# Patient Record
Sex: FEMALE | Race: BLACK OR AFRICAN AMERICAN | Employment: PART TIME | URBAN - METROPOLITAN AREA
[De-identification: names, ages, dates, MRNs, and addresses within clinical notes are randomized per-mention and may not be internally consistent; named-entity substitution may affect disease eponyms.]

---

## 2017-04-29 ENCOUNTER — HOSPITAL ENCOUNTER (EMERGENCY)
Age: 42
Discharge: HOME | End: 2017-04-29
Attending: EMERGENCY MEDICINE | Admitting: EMERGENCY MEDICINE
Payer: MEDICAID

## 2017-04-29 ENCOUNTER — APPOINTMENT (OUTPATIENT)
Dept: ULTRASOUND IMAGING | Age: 42
End: 2017-04-29
Attending: PHYSICIAN ASSISTANT
Payer: MEDICAID

## 2017-04-29 VITALS
RESPIRATION RATE: 18 BRPM | HEIGHT: 65 IN | BODY MASS INDEX: 29.16 KG/M2 | WEIGHT: 175 LBS | OXYGEN SATURATION: 99 % | TEMPERATURE: 97.3 F | SYSTOLIC BLOOD PRESSURE: 154 MMHG | DIASTOLIC BLOOD PRESSURE: 93 MMHG | HEART RATE: 77 BPM

## 2017-04-29 DIAGNOSIS — S16.1XXA CERVICAL STRAIN, INITIAL ENCOUNTER: ICD-10-CM

## 2017-04-29 DIAGNOSIS — N93.9 VAGINAL BLEEDING: Primary | ICD-10-CM

## 2017-04-29 LAB
ANION GAP BLD CALC-SCNC: 5 MMOL/L (ref 3–18)
APPEARANCE UR: ABNORMAL
BACTERIA URNS QL MICRO: ABNORMAL /HPF
BASOPHILS # BLD AUTO: 0 K/UL (ref 0–0.06)
BASOPHILS # BLD: 0 % (ref 0–2)
BILIRUB UR QL: NEGATIVE
BUN SERPL-MCNC: 8 MG/DL (ref 7–18)
BUN/CREAT SERPL: 12 (ref 12–20)
CALCIUM SERPL-MCNC: 8.2 MG/DL (ref 8.5–10.1)
CHLORIDE SERPL-SCNC: 107 MMOL/L (ref 100–108)
CO2 SERPL-SCNC: 28 MMOL/L (ref 21–32)
COLOR UR: ABNORMAL
CREAT SERPL-MCNC: 0.67 MG/DL (ref 0.6–1.3)
DIFFERENTIAL METHOD BLD: ABNORMAL
EOSINOPHIL # BLD: 0.1 K/UL (ref 0–0.4)
EOSINOPHIL NFR BLD: 1 % (ref 0–5)
EPITH CASTS URNS QL MICRO: ABNORMAL /LPF (ref 0–5)
ERYTHROCYTE [DISTWIDTH] IN BLOOD BY AUTOMATED COUNT: 15.6 % (ref 11.6–14.5)
GLUCOSE SERPL-MCNC: 95 MG/DL (ref 74–99)
GLUCOSE UR STRIP.AUTO-MCNC: NEGATIVE MG/DL
HCG UR QL: NEGATIVE
HCT VFR BLD AUTO: 28.4 % (ref 35–45)
HGB BLD-MCNC: 8.9 G/DL (ref 12–16)
HGB UR QL STRIP: ABNORMAL
KETONES UR QL STRIP.AUTO: ABNORMAL MG/DL
LEUKOCYTE ESTERASE UR QL STRIP.AUTO: ABNORMAL
LYMPHOCYTES # BLD AUTO: 31 % (ref 21–52)
LYMPHOCYTES # BLD: 2.7 K/UL (ref 0.9–3.6)
MCH RBC QN AUTO: 25.2 PG (ref 24–34)
MCHC RBC AUTO-ENTMCNC: 31.3 G/DL (ref 31–37)
MCV RBC AUTO: 80.5 FL (ref 74–97)
MONOCYTES # BLD: 0.8 K/UL (ref 0.05–1.2)
MONOCYTES NFR BLD AUTO: 9 % (ref 3–10)
MUCOUS THREADS URNS QL MICRO: ABNORMAL /LPF
NEUTS SEG # BLD: 5.1 K/UL (ref 1.8–8)
NEUTS SEG NFR BLD AUTO: 59 % (ref 40–73)
NITRITE UR QL STRIP.AUTO: NEGATIVE
PH UR STRIP: 8.5 [PH] (ref 5–8)
PLATELET # BLD AUTO: 292 K/UL (ref 135–420)
PMV BLD AUTO: 9 FL (ref 9.2–11.8)
POTASSIUM SERPL-SCNC: 3.8 MMOL/L (ref 3.5–5.5)
PROT UR STRIP-MCNC: 30 MG/DL
RBC # BLD AUTO: 3.53 M/UL (ref 4.2–5.3)
RBC #/AREA URNS HPF: ABNORMAL /HPF (ref 0–5)
SODIUM SERPL-SCNC: 140 MMOL/L (ref 136–145)
SP GR UR REFRACTOMETRY: 1.03 (ref 1–1.03)
UROBILINOGEN UR QL STRIP.AUTO: 1 EU/DL (ref 0.2–1)
WBC # BLD AUTO: 8.6 K/UL (ref 4.6–13.2)
WBC URNS QL MICRO: ABNORMAL /HPF (ref 0–4)

## 2017-04-29 PROCEDURE — 81001 URINALYSIS AUTO W/SCOPE: CPT | Performed by: PHYSICIAN ASSISTANT

## 2017-04-29 PROCEDURE — 85025 COMPLETE CBC W/AUTO DIFF WBC: CPT | Performed by: PHYSICIAN ASSISTANT

## 2017-04-29 PROCEDURE — 99284 EMERGENCY DEPT VISIT MOD MDM: CPT

## 2017-04-29 PROCEDURE — 80048 BASIC METABOLIC PNL TOTAL CA: CPT | Performed by: PHYSICIAN ASSISTANT

## 2017-04-29 PROCEDURE — 76830 TRANSVAGINAL US NON-OB: CPT

## 2017-04-29 PROCEDURE — 81025 URINE PREGNANCY TEST: CPT | Performed by: PHYSICIAN ASSISTANT

## 2017-04-29 RX ORDER — TRAMADOL HYDROCHLORIDE 50 MG/1
50 TABLET ORAL
Qty: 20 TAB | Refills: 0 | Status: SHIPPED | OUTPATIENT
Start: 2017-04-29 | End: 2017-05-04

## 2017-04-29 RX ORDER — SULFAMETHOXAZOLE AND TRIMETHOPRIM 800; 160 MG/1; MG/1
1 TABLET ORAL 2 TIMES DAILY
Qty: 6 TAB | Refills: 0 | Status: SHIPPED | OUTPATIENT
Start: 2017-04-29 | End: 2017-05-02

## 2017-04-29 NOTE — ED PROVIDER NOTES
HPI Comments: 11:51 AM Rosalva Abdullahi is a 38 y.o. female who presents to the ED c/o  vaginal bleeding that started yesterday but worsened this morning and neck pain x3 days. The pt reports that her last period was three weeks ago, but it was much heavier than usual. Her period lasted 5 days as usual. +sexually active, denies vaginal discharge. Denies recent injury or trauma to neck. Woke up with pain 2 days ago to bilateral sides, no midline pain. No concern for meningeal symptoms. She denies fever, trauma, HA, neck pain, N/V/D, abdominal pain, and any further complaints. History reviewed. No pertinent past medical history. Des Peck MD PCP       The history is provided by the patient. No past medical history on file. Past Surgical History:   Procedure Laterality Date    HX BREAST BIOPSY  6/2006    Right-benign    HX OTHER SURGICAL  1/2013    left neck cyst removal         Family History:   Problem Relation Age of Onset    Hypertension Mother     Hypertension Father     Heart Disease Father        Social History     Social History    Marital status: SINGLE     Spouse name: N/A    Number of children: N/A    Years of education: N/A     Occupational History    Not on file. Social History Main Topics    Smoking status: Current Every Day Smoker     Packs/day: 0.50     Years: 10.00    Smokeless tobacco: Not on file    Alcohol use 0.0 oz/week    Drug use: Yes     Special: Marijuana    Sexual activity: Yes     Partners: Male     Birth control/ protection: Condom     Other Topics Concern    Not on file     Social History Narrative         ALLERGIES: Review of patient's allergies indicates no known allergies. Review of Systems   Constitutional: Negative for chills and fever. HENT: Negative for congestion and sore throat. Respiratory: Negative for cough and shortness of breath. Cardiovascular: Negative for chest pain and leg swelling.    Gastrointestinal: Negative for abdominal pain, diarrhea, nausea and vomiting. Genitourinary: Positive for vaginal bleeding. Negative for dysuria and hematuria. Musculoskeletal: Positive for neck pain. Negative for back pain. Skin: Negative for rash and wound. Neurological: Positive for dizziness. Negative for syncope, light-headedness and headaches. Psychiatric/Behavioral: Negative for behavioral problems. The patient is not nervous/anxious. Vitals:    04/29/17 1142   BP: (!) 154/93   Pulse: 77   Resp: 18   Temp: 97.3 °F (36.3 °C)   SpO2: 99%   Weight: 79.4 kg (175 lb)   Height: 5' 5\" (1.651 m)            Physical Exam   Constitutional: She is oriented to person, place, and time. She appears well-developed and well-nourished. No distress. HENT:   Head: Normocephalic and atraumatic. Mouth/Throat: Oropharynx is clear and moist.   Eyes: Conjunctivae are normal.   Neck: Normal range of motion and full passive range of motion without pain. Neck supple. Muscular tenderness present. No spinous process tenderness present. No rigidity. No tracheal deviation, no edema, no erythema and normal range of motion present. No Brudzinski's sign and no Kernig's sign noted. No thyroid mass and no thyromegaly present. Mild paraspinal muscle tenderness with palpation. No spinous process tenderness    Cardiovascular: Normal rate, regular rhythm and normal heart sounds. Pulmonary/Chest: Effort normal and breath sounds normal. No respiratory distress. She has no wheezes. She exhibits no tenderness. Abdominal: Soft. She exhibits no distension and no mass. There is no tenderness. There is no rebound and no guarding. Genitourinary:   Genitourinary Comments: Refusing pelvic exam   Musculoskeletal: Normal range of motion. Neurological: She is alert and oriented to person, place, and time. No cranial nerve deficit. Coordination normal.   Skin: Skin is warm and dry. No rash noted. She is not diaphoretic.    Psychiatric: She has a normal mood and affect. Nursing note and vitals reviewed. MDM  Number of Diagnoses or Management Options  Cervical strain, initial encounter:   Vaginal bleeding:   Diagnosis management comments: Labs Reviewed  CBC WITH AUTOMATED DIFF - Abnormal; Notable for the following:      RBC                           3.53 (*)               HGB                           8.9 (*)                HCT                           28.4 (*)               RDW                           15.6 (*)               MPV                           9.0 (*)             All other components within normal limits  URINALYSIS W/ RFLX MICROSCOPIC - Abnormal; Notable for the following:      pH (UA)                       8.5 (*)                Protein                       30 (*)                 Ketone                        TRACE (*)               Blood                         LARGE (*)               Leukocyte Esterase            SMALL (*)            All other components within normal limits  METABOLIC PANEL, BASIC - Abnormal; Notable for the following:      Calcium                       8.2 (*)             All other components within normal limits  URINE MICROSCOPIC ONLY - Abnormal; Notable for the following:      Bacteria                      1+ (*)                 Mucus                         1+ (*)              All other components within normal limits  WET PREP  HCG URINE, QL  CHLAMYDIA/NEISSERIA AMPLIFICATION    Patient is refusing pelvic exam.  Pt states she wants to have this done in obgyn office. Vitals WNL, HTN but takes medication. Afebrile. Abdomen soft. Labs reassuring, H&H stable. UA shows mild UTI, will tx with 3 days of bactrim. OBGYN f/u in 1 week. Will give flexeril for cervical strain. No indication for imaging at this time- no midline tenderness, no injury. Discussed proper way to take medications. Discussed treatment plan, return precautions, symptomatic relief, and expected time to improvement. All questions answered.  Patient is stable for discharge and outpatient management. Bonifacio Vail PA-C 3:37 PM        Amount and/or Complexity of Data Reviewed  Clinical lab tests: ordered and reviewed  Tests in the radiology section of CPT®: reviewed and ordered      ED Course       Procedures    Vitals:  Patient Vitals for the past 12 hrs:   Temp Pulse Resp BP SpO2   04/29/17 1142 97.3 °F (36.3 °C) 77 18 (!) 154/93 99 %     Pulse ox reviewed and WNL    Medications ordered:   Medications - No data to display      Lab findings:  No results found for this or any previous visit (from the past 12 hour(s)). X-Ray, CT or other radiology findings or impressions:  No orders to display       Progress notes, Consult notes or additional Procedure notes:   stable    Disposition:  Diagnosis: No diagnosis found. Disposition: d/c home    Follow-up Information     None           Patient's Medications   Start Taking    No medications on file   Continue Taking    BACITRACIN (BACITRACIN) 500 UNIT/GRAM OINT    Apply to affected area TID X 10 DAYS   These Medications have changed    No medications on file   Stop Taking    No medications on file         SCRIBE ATTESTATION STATEMENT  Documented by: Micki Bhakta for, and in the presence of, Bonifacio Vail PA-C 11:55 AM     Signed by: Anjel Lynch, 04/29/17 11:55 AM    PROVIDER ATTESTATION STATEMENT  I personally performed the services described in the documentation, reviewed the documentation, as recorded by the scribe in my presence, and it accurately and completely records my words and actions.   Bonifacio Vail PA-C

## 2017-04-29 NOTE — DISCHARGE INSTRUCTIONS
Abnormal Uterine Bleeding: Care Instructions  Your Care Instructions  Abnormal uterine bleeding (AUB) is irregular bleeding from the uterus that is longer or heavier than usual or does not occur at your regular time. Sometimes it is caused by changes in hormone levels. It can also be caused by growths in the uterus, such as fibroids or polyps. Sometimes a cause cannot be found. You may have heavy bleeding when you are not expecting your period. Your doctor may suggest a pregnancy test, if you think you are pregnant. Follow-up care is a key part of your treatment and safety. Be sure to make and go to all appointments, and call your doctor if you are having problems. It's also a good idea to know your test results and keep a list of the medicines you take. How can you care for yourself at home? · Be safe with medicines. Take pain medicines exactly as directed. ¨ If the doctor gave you a prescription medicine for pain, take it as prescribed. ¨ If you are not taking a prescription pain medicine, ask your doctor if you can take an over-the-counter medicine. · You may be low in iron because of blood loss. Eat a balanced diet that is high in iron and vitamin C. Foods rich in iron include red meat, shellfish, eggs, beans, and leafy green vegetables. Talk to your doctor about whether you need to take iron pills or a multivitamin. When should you call for help? Call 911 anytime you think you may need emergency care. For example, call if:  · You passed out (lost consciousness). Call your doctor now or seek immediate medical care if:  · You have sudden, severe pain in your belly or pelvis. · You have severe vaginal bleeding. You are soaking through your usual pads or tampons every hour for 2 or more hours. · You feel dizzy or lightheaded, or you feel like you may faint. Watch closely for changes in your health, and be sure to contact your doctor if:  · You have new belly or pelvic pain.   · You have a fever.  · Your bleeding gets worse or lasts longer than 1 week. · You think you may be pregnant. Where can you learn more? Go to http://lenka-radha.info/. Enter D842 in the search box to learn more about \"Abnormal Uterine Bleeding: Care Instructions. \"  Current as of: October 13, 2016  Content Version: 11.2  © 0123-1718 [employer]. Care instructions adapted under license by [employer] (which disclaims liability or warranty for this information). If you have questions about a medical condition or this instruction, always ask your healthcare professional. [place] [address] any warranty or liability for your use of this information. Neck Strain or Sprain: Rehab Exercises  Your Care Instructions  Here are some examples of typical rehabilitation exercises for your condition. Start each exercise slowly. Ease off the exercise if you start to have pain. Your doctor or physical therapist will tell you when you can start these exercises and which ones will work best for you. How to do the exercises  Neck rotation    1. Sit in a firm chair, or stand up straight. 2. Keeping your chin level, turn your head to the right, and hold for 15 to 30 seconds. 3. Turn your head to the left and hold for 15 to 30 seconds. 4. Repeat 2 to 4 times to each side. Neck stretches    1. Look straight ahead, and tip your right ear to your right shoulder. Do not let your left shoulder rise up as you tip your head to the right. 2. Hold for 15 to 30 seconds. 3. Tilt your head to the left. Do not let your right shoulder rise up as you tip your head to the left. 4. Hold for 15 to 30 seconds. 5. Repeat 2 to 4 times to each side. Forward neck flexion    1. Sit in a firm chair, or stand up straight. 2. Bend your head forward. 3. Hold for 15 to 30 seconds. 4. Repeat 2 to 4 times. Lateral (side) bend strengthening    1.  With your right hand, place your first two fingers on your right temple. 2. Start to bend your head to the side while using gentle pressure from your fingers to keep your head from bending. 3. Hold for about 6 seconds. 4. Repeat 8 to 12 times. 5. Switch hands and repeat the same exercise on your left side. Forward bend strengthening    1. Place your first two fingers of either hand on your forehead. 2. Start to bend your head forward while using gentle pressure from your fingers to keep your head from bending. 3. Hold for about 6 seconds. 4. Repeat 8 to 12 times. Neutral position strengthening    1. Using one hand, place your fingertips on the back of your head at the top of your neck. 2. Start to bend your head backward while using gentle pressure from your fingers to keep your head from bending. 3. Hold for about 6 seconds. 4. Repeat 8 to 12 times. Chin tuck    1. Lie on the floor with a rolled-up towel under your neck. Your head should be touching the floor. 2. Slowly bring your chin toward your chest.  3. Hold for a count of 6, and then relax for up to 10 seconds. 4. Repeat 8 to 12 times. Follow-up care is a key part of your treatment and safety. Be sure to make and go to all appointments, and call your doctor if you are having problems. It's also a good idea to know your test results and keep a list of the medicines you take. Where can you learn more? Go to http://lenka-radha.info/. Enter M679 in the search box to learn more about \"Neck Strain or Sprain: Rehab Exercises. \"  Current as of: May 23, 2016  Content Version: 11.2  © 7627-4078 [employer]. Care instructions adapted under license by [employer] (which disclaims liability or warranty for this information). If you have questions about a medical condition or this instruction, always ask your healthcare professional. [address] any warranty or liability for your use of this information.

## 2017-04-29 NOTE — ED TRIAGE NOTES
Pt,  States   Was having    Having vaginal bleeding, heavier than usual for 5 days 3 weeks ago,  Right side neck pain on & off dizziness  Started 3 dasys ago, , vaginal bleeding  Again this morning

## 2017-04-29 NOTE — LETTER
[address] [place] [address] Dr [facility] EMERGENCY DEPT 
[address] [facility] [address] 
[phone] Work/School Note Date: 4/29/2017 To Whom It May concern: 
 
Alejo Mead was seen and treated today in the emergency room by the following provider(s): 
Attending Provider: Ivonne Mcmillan MD 
Physician Assistant: Dori Foley PA-C. Alejo Mead may return to work on 5/1/17. Sincerely, Cassie Szymanski RN

## 2017-05-23 ENCOUNTER — HOSPITAL ENCOUNTER (OUTPATIENT)
Dept: PREADMISSION TESTING | Age: 42
Discharge: HOME | End: 2017-05-23
Payer: MEDICAID

## 2017-05-23 ENCOUNTER — HOSPITAL ENCOUNTER (OUTPATIENT)
Dept: GENERAL RADIOLOGY | Age: 42
Discharge: HOME | End: 2017-05-23
Payer: MEDICAID

## 2017-05-23 DIAGNOSIS — Z01.811 PRE-OPERATIVE RESPIRATORY EXAMINATION: ICD-10-CM

## 2017-05-23 DIAGNOSIS — Z01.810 PRE-OPERATIVE CARDIOVASCULAR EXAMINATION: ICD-10-CM

## 2017-05-23 LAB
ATRIAL RATE: 68 BPM
CALCULATED P AXIS, ECG09: 36 DEGREES
CALCULATED R AXIS, ECG10: 59 DEGREES
CALCULATED T AXIS, ECG11: 28 DEGREES
DIAGNOSIS, 93000: NORMAL
P-R INTERVAL, ECG05: 170 MS
Q-T INTERVAL, ECG07: 396 MS
QRS DURATION, ECG06: 76 MS
QTC CALCULATION (BEZET), ECG08: 421 MS
VENTRICULAR RATE, ECG03: 68 BPM

## 2017-05-23 PROCEDURE — 93005 ELECTROCARDIOGRAM TRACING: CPT

## 2017-05-23 PROCEDURE — 71020 XR CHEST PA LAT: CPT

## 2018-02-09 ENCOUNTER — HOSPITAL ENCOUNTER (EMERGENCY)
Age: 43
Discharge: HOME | End: 2018-02-09
Attending: EMERGENCY MEDICINE | Admitting: EMERGENCY MEDICINE
Payer: MEDICAID

## 2018-02-09 VITALS
WEIGHT: 170 LBS | BODY MASS INDEX: 28.32 KG/M2 | RESPIRATION RATE: 16 BRPM | HEART RATE: 83 BPM | OXYGEN SATURATION: 96 % | DIASTOLIC BLOOD PRESSURE: 104 MMHG | SYSTOLIC BLOOD PRESSURE: 155 MMHG | TEMPERATURE: 98.1 F | HEIGHT: 65 IN

## 2018-02-09 DIAGNOSIS — R11.2 NON-INTRACTABLE VOMITING WITH NAUSEA, UNSPECIFIED VOMITING TYPE: ICD-10-CM

## 2018-02-09 DIAGNOSIS — R68.89 FLU-LIKE SYMPTOMS: Primary | ICD-10-CM

## 2018-02-09 DIAGNOSIS — R03.0 ELEVATED BLOOD PRESSURE READING: ICD-10-CM

## 2018-02-09 PROCEDURE — 99283 EMERGENCY DEPT VISIT LOW MDM: CPT

## 2018-02-09 PROCEDURE — 74011250637 HC RX REV CODE- 250/637: Performed by: PHYSICIAN ASSISTANT

## 2018-02-09 RX ORDER — ONDANSETRON 4 MG/1
4 TABLET, ORALLY DISINTEGRATING ORAL
Status: COMPLETED | OUTPATIENT
Start: 2018-02-09 | End: 2018-02-09

## 2018-02-09 RX ORDER — ONDANSETRON 4 MG/1
TABLET, ORALLY DISINTEGRATING ORAL
Qty: 10 TAB | Refills: 0 | Status: SHIPPED | OUTPATIENT
Start: 2018-02-09 | End: 2018-06-28

## 2018-02-09 RX ORDER — OSELTAMIVIR PHOSPHATE 75 MG/1
75 CAPSULE ORAL 2 TIMES DAILY
Qty: 10 CAP | Refills: 0 | Status: SHIPPED | OUTPATIENT
Start: 2018-02-09 | End: 2018-02-14

## 2018-02-09 RX ADMIN — ONDANSETRON 4 MG: 4 TABLET, ORALLY DISINTEGRATING ORAL at 10:33

## 2018-02-09 NOTE — Clinical Note
Take medications as prescribed Resume home medications Tylenol/Motrin for pain/fever Increase fluids Follow up with PCP

## 2018-02-09 NOTE — ED PROVIDER NOTES
EMERGENCY DEPARTMENT HISTORY AND PHYSICAL EXAM    Date: 2/9/2018  Patient Name: Pablo Tate    History of Presenting Illness     Chief Complaint   Patient presents with    Vomiting    Diarrhea         History Provided By: Patient    Chief Complaint: vomiting, body ache, URI sx  Duration: 2 Days  Timing:  Acute  Location:   Quality: na  Severity: Moderate  Modifying Factors: none  Associated Symptoms: cough, subjective fever, body ache, fatigue      Additional History (Context): Pablo Tate is a 41 y.o. female with No significant past medical history who presents to the Ed with a complaint of vomiting and diarrhea x1 day. Pt states that she has not been feeling well for the past 3 days with cold like sx. Last night pt states that she began vomiting and this morning she had one episode of diarrhea. Pt states that he son was sick earlier in the week but his sx lasted about 3 days and resolved. She admits to body ache, subjective fever, non productive, cough and nasal congestion. She denies flu shot, HA, SOB, CP, abdominal or pelvic pain, urinary sx, blood in stool or emesis. PCP: Matt Bishop MD (Inactive)        Past History     Past Medical History:  History reviewed. No pertinent past medical history. Past Surgical History:  Past Surgical History:   Procedure Laterality Date    HX BREAST BIOPSY  6/2006    Right-benign    HX OTHER SURGICAL  1/2013    left neck cyst removal       Family History:  Family History   Problem Relation Age of Onset    Hypertension Mother     Hypertension Father     Heart Disease Father        Social History:  Social History   Substance Use Topics    Smoking status: Current Every Day Smoker     Packs/day: 0.50     Years: 10.00    Smokeless tobacco: Never Used    Alcohol use 0.0 oz/week       Allergies:  No Known Allergies      Review of Systems   Review of Systems   Constitutional: Negative for fatigue and fever. HENT: Positive for congestion. Respiratory: Positive for cough. Negative for shortness of breath. Cardiovascular: Negative for chest pain. Gastrointestinal: Positive for diarrhea, nausea and vomiting. Negative for abdominal pain and blood in stool. Genitourinary: Positive for difficulty urinating. Negative for dysuria, frequency, pelvic pain and urgency. Musculoskeletal: Positive for myalgias. Negative for back pain. Skin: Negative for wound. Neurological: Negative for dizziness, numbness and headaches. All other systems reviewed and are negative. All Other Systems Negative  Physical Exam     Vitals:    02/09/18 0850   BP: (!) 155/104   Pulse: 83   Resp: 16   Temp: 98.1 °F (36.7 °C)   SpO2: 96%   Weight: 77.1 kg (170 lb)   Height: 5' 5\" (1.651 m)     Physical Exam   Constitutional: She is oriented to person, place, and time. She appears well-developed and well-nourished. She appears distressed (mildly). HENT:   Head: Normocephalic and atraumatic. Right Ear: Hearing, tympanic membrane and external ear normal.   Left Ear: Hearing, tympanic membrane and external ear normal.   Nose: Mucosal edema and rhinorrhea present. Mouth/Throat: Uvula is midline and mucous membranes are normal. No trismus in the jaw. No uvula swelling. Posterior oropharyngeal erythema present. No oropharyngeal exudate, posterior oropharyngeal edema or tonsillar abscesses. Eyes: Conjunctivae are normal. Pupils are equal, round, and reactive to light. Neck: Normal range of motion. Cardiovascular: Normal rate, regular rhythm and normal heart sounds. No murmur heard. Pulmonary/Chest: Effort normal and breath sounds normal. No respiratory distress. She has no wheezes. Abdominal: Soft. Bowel sounds are normal. She exhibits no distension. There is no tenderness. Musculoskeletal: Normal range of motion. Neurological: She is alert and oriented to person, place, and time. Skin: Skin is warm and dry.    Psychiatric: She has a normal mood and affect. Her behavior is normal.   Vitals reviewed. Diagnostic Study Results     Labs -   No results found for this or any previous visit (from the past 12 hour(s)). Radiologic Studies -   No orders to display     CT Results  (Last 48 hours)    None        CXR Results  (Last 48 hours)    None            Medical Decision Making   I am the first provider for this patient. I reviewed the vital signs, available nursing notes, past medical history, past surgical history, family history and social history. Vital Signs-Reviewed the patient's vital signs. Pulse Oximetry Analysis - 96% on RA          Records Reviewed: Old Medical Records, no recent ED visits    Procedures:  Procedures    Provider Notes (Medical Decision Making):     Pt exhibiting flu like sx, will not test due to swab shortage/back order. Will give zofran and PO challenge and discharge home if pt is able to tolerate oral rehydration. 11:15 AM pt is drinking Ginger Ale and eating crackers in room. She requests discharge at this time. MED RECONCILIATION:  No current facility-administered medications for this encounter. Current Outpatient Prescriptions   Medication Sig    oseltamivir (TAMIFLU) 75 mg capsule Take 1 Cap by mouth two (2) times a day for 5 days.  ondansetron (ZOFRAN ODT) 4 mg disintegrating tablet Take 1-2 tablets every 6-8 hours as needed for nausea and vomiting. Disposition:  Discharge    DISCHARGE NOTE:     Patient has no new complaints, changes, or physical findings. Care plan outlined and precautions discussed. All medications were reviewed with the patient; will d/c home with tamifllu and zofran. All of pt's questions and concerns were addressed. Patient was instructed and agrees to follow up with PCP, as well as to return to the ED upon further deterioration. Patient is ready to go home.     Follow-up Information     Follow up With Details Comments Contact Info    Mary Guaman MD Call As needed, follow up     [facility] EMERGENCY DEPT  If symptoms worsen [address] [facility] [address]  [phone]          Current Discharge Medication List      START taking these medications    Details   oseltamivir (TAMIFLU) 75 mg capsule Take 1 Cap by mouth two (2) times a day for 5 days. Qty: 10 Cap, Refills: 0      ondansetron (ZOFRAN ODT) 4 mg disintegrating tablet Take 1-2 tablets every 6-8 hours as needed for nausea and vomiting. Qty: 10 Tab, Refills: 0                 Diagnosis     Clinical Impression:   1. Flu-like symptoms    2.  Non-intractable vomiting with nausea, unspecified vomiting type

## 2018-02-09 NOTE — LETTER
NOTIFICATION RETURN TO WORK / SCHOOL 
 
2/9/2018 10:25 AM 
 
Ms. Ila Burroughs [id number] [address] [place] [id number] To Whom It May Concern: 
 
Ila Burroughs is currently under the care of [facility] EMERGENCY DEPT. She will return to work/school on: 2.12.18 If there are questions or concerns please have the patient contact our office.  
 
 
 
Sincerely, 
 
 
DALE Cee

## 2018-02-09 NOTE — DISCHARGE INSTRUCTIONS
Nausea and Vomiting: Care Instructions  Your Care Instructions    When you are nauseated, you may feel weak and sweaty and notice a lot of saliva in your mouth. Nausea often leads to vomiting. Most of the time you do not need to worry about nausea and vomiting, but they can be signs of other illnesses. Two common causes of nausea and vomiting are stomach flu and food poisoning. Nausea and vomiting from viral stomach flu will usually start to improve within 24 hours. Nausea and vomiting from food poisoning may last from 12 to 48 hours. The doctor has checked you carefully, but problems can develop later. If you notice any problems or new symptoms, get medical treatment right away. Follow-up care is a key part of your treatment and safety. Be sure to make and go to all appointments, and call your doctor if you are having problems. It's also a good idea to know your test results and keep a list of the medicines you take. How can you care for yourself at home? · To prevent dehydration, drink plenty of fluids, enough so that your urine is light yellow or clear like water. Choose water and other caffeine-free clear liquids until you feel better. If you have kidney, heart, or liver disease and have to limit fluids, talk with your doctor before you increase the amount of fluids you drink. · Rest in bed until you feel better. · When you are able to eat, try clear soups, mild foods, and liquids until all symptoms are gone for 12 to 48 hours. Other good choices include dry toast, crackers, cooked cereal, and gelatin dessert, such as Jell-O. When should you call for help? Call 911 anytime you think you may need emergency care. For example, call if:  ? · You passed out (lost consciousness). ?Call your doctor now or seek immediate medical care if:  ? · You have symptoms of dehydration, such as:  ¨ Dry eyes and a dry mouth. ¨ Passing only a little dark urine.   ¨ Feeling thirstier than usual.   ? · You have new or worsening belly pain. ? · You have a new or higher fever. ? · You vomit blood or what looks like coffee grounds. ? Watch closely for changes in your health, and be sure to contact your doctor if:  ? · You have ongoing nausea and vomiting. ? · Your vomiting is getting worse. ? · Your vomiting lasts longer than 2 days. ? · You are not getting better as expected. Where can you learn more? Go to http://lenka-radah.info/. Enter 25 246356 in the search box to learn more about \"Nausea and Vomiting: Care Instructions. \"  Current as of: March 20, 2017  Content Version: 11.4  © 3985-7479 [employer]. Care instructions adapted under license by [employer] (which disclaims liability or warranty for this information). If you have questions about a medical condition or this instruction, always ask your healthcare professional. [address] any warranty or liability for your use of this information.

## 2018-02-09 NOTE — ED TRIAGE NOTES
The patient presents for evaluation of nausea, vomiting, and diarrhea that began yesterday. She is also complaining of right hand pain.

## 2018-02-19 ENCOUNTER — HOSPITAL ENCOUNTER (OUTPATIENT)
Dept: GENERAL RADIOLOGY | Age: 43
Discharge: HOME | End: 2018-02-19
Payer: MEDICAID

## 2018-02-19 DIAGNOSIS — J18.9 PNEUMONIA: ICD-10-CM

## 2018-02-19 PROCEDURE — 71046 X-RAY EXAM CHEST 2 VIEWS: CPT

## 2018-06-28 ENCOUNTER — HOSPITAL ENCOUNTER (EMERGENCY)
Age: 43
Discharge: HOME | End: 2018-06-28
Attending: EMERGENCY MEDICINE
Payer: MEDICAID

## 2018-06-28 VITALS
DIASTOLIC BLOOD PRESSURE: 92 MMHG | RESPIRATION RATE: 16 BRPM | TEMPERATURE: 98.3 F | HEIGHT: 65 IN | HEART RATE: 85 BPM | SYSTOLIC BLOOD PRESSURE: 141 MMHG | BODY MASS INDEX: 29.99 KG/M2 | WEIGHT: 180 LBS | OXYGEN SATURATION: 100 %

## 2018-06-28 DIAGNOSIS — L03.115 CELLULITIS OF RIGHT THIGH: Primary | ICD-10-CM

## 2018-06-28 PROCEDURE — 99283 EMERGENCY DEPT VISIT LOW MDM: CPT

## 2018-06-28 RX ORDER — NAPROXEN 375 MG/1
375 TABLET ORAL 2 TIMES DAILY WITH MEALS
Qty: 20 TAB | Refills: 0 | Status: SHIPPED | OUTPATIENT
Start: 2018-06-28 | End: 2019-03-06

## 2018-06-28 RX ORDER — SULFAMETHOXAZOLE AND TRIMETHOPRIM 800; 160 MG/1; MG/1
2 TABLET ORAL 2 TIMES DAILY
Qty: 40 TAB | Refills: 0 | Status: SHIPPED | OUTPATIENT
Start: 2018-06-28 | End: 2018-07-08

## 2018-06-28 RX ORDER — TRAMADOL HYDROCHLORIDE 50 MG/1
50 TABLET ORAL
Qty: 10 TAB | Refills: 0 | Status: SHIPPED | OUTPATIENT
Start: 2018-06-28 | End: 2019-03-06

## 2018-06-28 NOTE — LETTER
[facility] EMERGENCY DEPT 
[address] [id number] [phone] Work/School Note Date: 6/28/2018 To Whom It May concern: 
 
Santana Gaston was seen and treated today in the emergency room by the following provider(s): 
Attending Provider: Randall Krueger MD 
Physician Assistant: DALE Kramer. Santana Gaston may be excused from work on 6/28 and 6/29. Sincerely, DALE Kramer

## 2018-06-28 NOTE — DISCHARGE INSTRUCTIONS
Cellulitis: Care Instructions  Your Care Instructions    Cellulitis is a skin infection. It often occurs after a break in the skin from a scrape, cut, bite, or puncture, or after a rash. The doctor has checked you carefully, but problems can develop later. If you notice any problems or new symptoms, get medical treatment right away. Follow-up care is a key part of your treatment and safety. Be sure to make and go to all appointments, and call your doctor if you are having problems. It's also a good idea to know your test results and keep a list of the medicines you take. How can you care for yourself at home? · Take your antibiotics as directed. Do not stop taking them just because you feel better. You need to take the full course of antibiotics. · Prop up the infected area on pillows to reduce pain and swelling. Try to keep the area above the level of your heart as often as you can. · If your doctor told you how to care for your wound, follow your doctor's instructions. If you did not get instructions, follow this general advice:  ¨ Wash the wound with clean water 2 times a day. Don't use hydrogen peroxide or alcohol, which can slow healing. ¨ You may cover the wound with a thin layer of petroleum jelly, such as Vaseline, and a nonstick bandage. ¨ Apply more petroleum jelly and replace the bandage as needed. · Be safe with medicines. Take pain medicines exactly as directed. ¨ If the doctor gave you a prescription medicine for pain, take it as prescribed. ¨ If you are not taking a prescription pain medicine, ask your doctor if you can take an over-the-counter medicine. To prevent cellulitis in the future  · Try to prevent cuts, scrapes, or other injuries to your skin. Cellulitis most often occurs where there is a break in the skin. · If you get a scrape, cut, mild burn, or bite, wash the wound with clean water as soon as you can to help avoid infection.  Don't use hydrogen peroxide or alcohol, which can slow healing. · If you have swelling in your legs (edema), support stockings and good skin care may help prevent leg sores and cellulitis. · Take care of your feet, especially if you have diabetes or other conditions that increase the risk of infection. Wear shoes and socks. Do not go barefoot. If you have athlete's foot or other skin problems on your feet, talk to your doctor about how to treat them. When should you call for help? Call your doctor now or seek immediate medical care if:  ? · You have signs that your infection is getting worse, such as:  ¨ Increased pain, swelling, warmth, or redness. ¨ Red streaks leading from the area. ¨ Pus draining from the area. ¨ A fever. ? · You get a rash. ? Watch closely for changes in your health, and be sure to contact your doctor if:  ? · You are not getting better after 1 day (24 hours). ? · You do not get better as expected. Where can you learn more? Go to http://lenka-radha.info/. Joy Hernandez in the search box to learn more about \"Cellulitis: Care Instructions. \"  Current as of: October 13, 2016  Content Version: 11.4  © 1707-6630 [employer]. Care instructions adapted under license by [employer] (which disclaims liability or warranty for this information). If you have questions about a medical condition or this instruction, always ask your healthcare professional. [place] [address] any warranty or liability for your use of this information.

## 2018-06-28 NOTE — ED PROVIDER NOTES
EMERGENCY DEPARTMENT HISTORY AND PHYSICAL EXAM    Date: 6/28/2018  Patient Name: Elsa Valdivia    History of Presenting Illness     Chief Complaint   Patient presents with    Abscess         History Provided By: Patient    Chief Complaint: abscess on right leg   Duration: 3 Days  Timing:  Acute  Location: R under gluteal   Quality: Aching  Severity: 8 out of 10  Modifying Factors: worse with walking, sitting  Associated Symptoms: denies any other associated signs or symptoms      Additional History (Context): Elsa Valdivia is a 41 y.o. female with No significant past medical history who presents with c/o abscess x 3 days. Pt states \"boil\" on posterior thigh x 3 days that has increased in size. Pt states that its achy, 8/10 pain, and radiates down to her knee. Pt states she had to take yesterday off work due to pain and is worse with walking or sitting. Pt states she tried tylenol and aspirin for pain, last dose at 8 am. Pt denied fever, chills, nausea, vomiting, hiking. PCP: Driscilla Prader, MD        Past History     Past Medical History:  History reviewed. No pertinent past medical history. Past Surgical History:  Past Surgical History:   Procedure Laterality Date    HX BREAST BIOPSY  6/2006    Right-benign    HX OTHER SURGICAL  1/2013    left neck cyst removal       Family History:  Family History   Problem Relation Age of Onset    Hypertension Mother     Hypertension Father     Heart Disease Father        Social History:  Social History   Substance Use Topics    Smoking status: Current Every Day Smoker     Packs/day: 0.50     Years: 10.00    Smokeless tobacco: Never Used    Alcohol use 0.0 oz/week       Allergies:  No Known Allergies      Review of Systems   Review of Systems   Constitutional: Negative for chills and fever. HENT: Positive for congestion. Negative for sore throat. Respiratory: Negative for cough and shortness of breath.     Cardiovascular: Negative for chest pain and palpitations. Gastrointestinal: Negative for abdominal pain, diarrhea, nausea and vomiting. Skin: Negative for rash. Neurological: Negative for weakness. All other systems reviewed and are negative. All Other Systems Negative  Physical Exam     Vitals:    06/28/18 1112   BP: (!) 141/92   Pulse: 85   Resp: 16   Temp: 98.3 °F (36.8 °C)   SpO2: 100%   Weight: 81.6 kg (180 lb)   Height: 5' 5\" (1.651 m)     Physical Exam   Constitutional: She appears well-developed and well-nourished. HENT:   Head: Normocephalic and atraumatic. Right Ear: External ear normal.   Left Ear: External ear normal.   Nose: Nose normal.   Mouth/Throat: Oropharynx is clear and moist.   Eyes: Conjunctivae and EOM are normal. Pupils are equal, round, and reactive to light. Right eye exhibits no discharge. Left eye exhibits no discharge. Neck: Normal range of motion. Neck supple. Cardiovascular: Normal rate, regular rhythm and normal heart sounds. Pulmonary/Chest: Effort normal and breath sounds normal. No respiratory distress. She has no wheezes. She has no rales. Musculoskeletal: Tenderness: at hamstring insertion site. Skin: Skin is warm and dry. She is not diaphoretic. Diagnostic Study Results     Labs -   No results found for this or any previous visit (from the past 12 hour(s)). Radiologic Studies -   No orders to display     CT Results  (Last 48 hours)    None        CXR Results  (Last 48 hours)    None            Medical Decision Making   I am the first provider for this patient. I reviewed the vital signs, available nursing notes, past medical history, past surgical history, family history and social history. Records Reviewed: Nursing Notes    Procedures:  Procedures    Provider Notes (Medical Decision Making): Pt has area of cellulitis to left proximal posterior thigh. No definite abscess to I&D.  Pt agrees to abx, pain meds, compresses at home and to return if worsens. DDX: abscess, cellulitis    MED RECONCILIATION:  No current facility-administered medications for this encounter. No current outpatient prescriptions on file. Disposition:  discharge    DISCHARGE NOTE:     Patient is improved, resting quietly and comfortably. The patient will be discharged home.      The patient was reassured that these symptoms do not appear to represent a serious or life threatening condition at this time. Warning signs of worsening condition were discussed and understood by the patient.      Based on patient's age, coexisting illness, exam, and the results of this ED evaluation, the decision to treat as an outpatient was made. Based on the information available at time of discharge, acute pathology requiring immediate intervention was deemed relative unlikely. While it is impossible to completely exclude the possibility of underlying serious disease or worsening of condition, I feel the relative likelihood is extremely low. I discussed this uncertainty with the patient, who understood ED evaluation and treatment and felt comfortable with the outpatient treatment plan.      All questions regarding care, test results, and follow up were answered. The patient is stable and appropriate to discharge. They understand that they should return to the emergency department for any new or worsening symptoms. I stressed the importance of follow up for repeat assessment and possibly further evaluation/treatment. Follow-up Information     None          There are no discharge medications for this patient. Diagnosis     Clinical Impression: No diagnosis found.

## 2018-06-28 NOTE — ED TRIAGE NOTES
Patient states \"boil\" to right upper posterior leg x couple days. She denies drainage from site. C/o pain.

## 2019-03-06 ENCOUNTER — HOSPITAL ENCOUNTER (EMERGENCY)
Age: 44
Discharge: HOME | End: 2019-03-06
Attending: EMERGENCY MEDICINE
Payer: SELF-PAY

## 2019-03-06 VITALS
WEIGHT: 190 LBS | RESPIRATION RATE: 18 BRPM | DIASTOLIC BLOOD PRESSURE: 97 MMHG | HEART RATE: 88 BPM | OXYGEN SATURATION: 100 % | BODY MASS INDEX: 31.65 KG/M2 | SYSTOLIC BLOOD PRESSURE: 157 MMHG | TEMPERATURE: 98.6 F | HEIGHT: 65 IN

## 2019-03-06 DIAGNOSIS — M79.674 PAIN AROUND TOENAIL, RIGHT FOOT: Primary | ICD-10-CM

## 2019-03-06 PROCEDURE — 99282 EMERGENCY DEPT VISIT SF MDM: CPT

## 2019-03-06 RX ORDER — IBUPROFEN 800 MG/1
800 TABLET ORAL
Qty: 20 TAB | Refills: 0 | Status: SHIPPED | OUTPATIENT
Start: 2019-03-06 | End: 2019-03-13

## 2019-03-06 RX ORDER — DOXYCYCLINE 100 MG/1
100 CAPSULE ORAL 2 TIMES DAILY
Qty: 14 CAP | Refills: 0 | Status: SHIPPED | OUTPATIENT
Start: 2019-03-06 | End: 2019-03-13

## 2019-03-07 NOTE — DISCHARGE INSTRUCTIONS

## 2019-03-07 NOTE — ED PROVIDER NOTES
The patient is a 45 y.o. Female presents to the ED with complaints of imoderate right great toe pain for one month. She denies any fall or injury. The patient states she has tried to soak her toe without relief. Ambulating worsens her pain. Denies any other associated signs or symptoms. The patient has no further complaints. The history is provided by the patient. Toe Pain    This is a new problem. Episode onset: one month. Episode frequency: Intermittent. Pain location: right great toe. The pain is moderate. Associated symptoms include back pain. Exacerbated by: ambulating. History reviewed. No pertinent past medical history. Past Surgical History:   Procedure Laterality Date    HX BREAST BIOPSY  6/2006    Right-benign    HX OTHER SURGICAL  1/2013    left neck cyst removal         Family History:   Problem Relation Age of Onset    Hypertension Mother     Hypertension Father     Heart Disease Father        Social History     Socioeconomic History    Marital status: SINGLE     Spouse name: Not on file    Number of children: Not on file    Years of education: Not on file    Highest education level: Not on file   Social Needs    Financial resource strain: Not on file    Food insecurity - worry: Not on file    Food insecurity - inability: Not on file   Szl needs - medical: Not on file   Szl needs - non-medical: Not on file   Occupational History    Not on file   Tobacco Use    Smoking status: Current Every Day Smoker     Packs/day: 0.50     Years: 10.00     Pack years: 5.00    Smokeless tobacco: Never Used   Substance and Sexual Activity    Alcohol use: Yes     Alcohol/week: 0.0 oz    Drug use: Yes     Types: Marijuana    Sexual activity: Yes     Partners: Male     Birth control/protection: Condom   Other Topics Concern    Not on file   Social History Narrative    Not on file         ALLERGIES: Patient has no known allergies.     Review of Systems Constitutional: Negative for fever. Cardiovascular: Negative for chest pain. Musculoskeletal: Positive for arthralgias, back pain, gait problem and joint swelling. Positive for right toe pain. All other systems reviewed and are negative. Vitals:    03/06/19 1929   BP: (!) 157/97   Pulse: 88   Resp: 18   Temp: 98.6 °F (37 °C)   SpO2: 100%   Weight: 86.2 kg (190 lb)   Height: 5' 5\" (1.651 m)            Physical Exam   Constitutional: She is oriented to person, place, and time. She appears well-developed and well-nourished. HENT:   Head: Normocephalic and atraumatic. Eyes: Conjunctivae and EOM are normal. Pupils are equal, round, and reactive to light. Neck: Normal range of motion. Neck supple. Cardiovascular: Normal rate and regular rhythm. Pulmonary/Chest: Effort normal and breath sounds normal.   Abdominal: Soft. Bowel sounds are normal.   Musculoskeletal: Normal range of motion. +tenderness around right great toe nail bed, no sign of ingrown toenail, no sign of infection, no erythema, no swelling   Neurological: She is alert and oriented to person, place, and time. She has normal reflexes. Skin: Skin is warm and dry. Psychiatric: She has a normal mood and affect. Her behavior is normal. Judgment and thought content normal.   Nursing note and vitals reviewed. MDM  Number of Diagnoses or Management Options  Pain around toenail, right foot: minor  Diagnosis management comments: No sign of trauma or infection, I suspect there may be a brewing ingrown toenail. I will place on doxy and refer to podiatry.   No trauma or sign of trauma no xray indicated       Amount and/or Complexity of Data Reviewed  Review and summarize past medical records: yes  Independent visualization of images, tracings, or specimens: yes    Risk of Complications, Morbidity, and/or Mortality  Presenting problems: minimal  Diagnostic procedures: minimal  Management options: minimal    Patient Progress  Patient progress: stable         Procedures            Vitals:  Patient Vitals for the past 12 hrs:   Temp Pulse Resp BP SpO2   03/06/19 1929 98.6 °F (37 °C) 88 18 (!) 157/97 100 %             Disposition:    Diagnosis:   1. Pain around toenail, right foot        Disposition: to Home      Follow-up Information     Follow up With Specialties Details Why Contact Info    Rodger Lowry DPM Podiatry In 2 days  [address] [place] [address]  [phone]                Medication List      START taking these medications    doxycycline 100 mg capsule  Commonly known as:  MONODOX  Take 1 Cap by mouth two (2) times a day for 7 days. ibuprofen 800 mg tablet  Commonly known as:  MOTRIN  Take 1 Tab by mouth every six (6) hours as needed for Pain for up to 7 days. Where to Get Your Medications      Information about where to get these medications is not yet available    Ask your nurse or doctor about these medications  · doxycycline 100 mg capsule  · ibuprofen 800 mg tablet         Return to the ER if you are unable to obtain referral as directed. Diamante Wang Bladimir's  results have been reviewed with her. She has been counseled regarding her diagnosis, treatment, and plan. She verbally conveys understanding and agreement of the signs, symptoms, diagnosis, treatment and prognosis and additionally agrees to follow up as discussed. She also agrees with the care-plan and conveys that all of her questions have been answered. I have also provided discharge instructions for her that include: educational information regarding their diagnosis and treatment, and list of reasons why they would want to return to the ED prior to their follow-up appointment, should her condition change.         FEDERICA Farrell      Scribe Attestation      Darian Healy acting as a scribe for and in the presence of FEDERICA Farrell            March 06, 2019 at 7:57 PM       Provider Attestation:      I personally performed the services described in the documentation, reviewed the documentation, as recorded by the scribe in my presence, and it accurately and completely records my words and actions.  March 06, 2019 at 7:57 PM - Brooke PORTILLO,TATIP-C

## 2019-03-07 NOTE — ED TRIAGE NOTES
Patient reports pain to her right great toe she reports that the pain has been on and off for the last month. Skin is intact with only slight redness and no warmth or swelling.

## 2019-05-12 ENCOUNTER — HOSPITAL ENCOUNTER (EMERGENCY)
Age: 44
Discharge: HOME | End: 2019-05-12
Attending: EMERGENCY MEDICINE
Payer: SELF-PAY

## 2019-05-12 ENCOUNTER — APPOINTMENT (OUTPATIENT)
Dept: CT IMAGING | Age: 44
End: 2019-05-12
Attending: PHYSICIAN ASSISTANT
Payer: SELF-PAY

## 2019-05-12 VITALS
WEIGHT: 190 LBS | HEIGHT: 65 IN | BODY MASS INDEX: 31.65 KG/M2 | HEART RATE: 93 BPM | DIASTOLIC BLOOD PRESSURE: 66 MMHG | OXYGEN SATURATION: 98 % | RESPIRATION RATE: 16 BRPM | SYSTOLIC BLOOD PRESSURE: 121 MMHG | TEMPERATURE: 98.1 F

## 2019-05-12 DIAGNOSIS — N30.01 ACUTE CYSTITIS WITH HEMATURIA: ICD-10-CM

## 2019-05-12 DIAGNOSIS — R11.2 NON-INTRACTABLE VOMITING WITH NAUSEA, UNSPECIFIED VOMITING TYPE: Primary | ICD-10-CM

## 2019-05-12 DIAGNOSIS — R10.13 ABDOMINAL PAIN, EPIGASTRIC: ICD-10-CM

## 2019-05-12 LAB
ALBUMIN SERPL-MCNC: 4.5 G/DL (ref 3.4–5)
ALBUMIN/GLOB SERPL: 0.9 {RATIO} (ref 0.8–1.7)
ALP SERPL-CCNC: 77 U/L (ref 45–117)
ALT SERPL-CCNC: 35 U/L (ref 13–56)
ANION GAP SERPL CALC-SCNC: 10 MMOL/L (ref 3–18)
APPEARANCE UR: ABNORMAL
AST SERPL-CCNC: 24 U/L (ref 15–37)
BACTERIA URNS QL MICRO: ABNORMAL /HPF
BASOPHILS # BLD: 0 K/UL (ref 0–0.1)
BASOPHILS NFR BLD: 0 % (ref 0–2)
BILIRUB SERPL-MCNC: 0.8 MG/DL (ref 0.2–1)
BILIRUB UR QL: NEGATIVE
BUN SERPL-MCNC: 12 MG/DL (ref 7–18)
BUN/CREAT SERPL: 13 (ref 12–20)
CALCIUM SERPL-MCNC: 9.6 MG/DL (ref 8.5–10.1)
CHLORIDE SERPL-SCNC: 105 MMOL/L (ref 100–108)
CO2 SERPL-SCNC: 24 MMOL/L (ref 21–32)
COLOR UR: YELLOW
CREAT SERPL-MCNC: 0.89 MG/DL (ref 0.6–1.3)
DIFFERENTIAL METHOD BLD: ABNORMAL
EOSINOPHIL # BLD: 0 K/UL (ref 0–0.4)
EOSINOPHIL NFR BLD: 0 % (ref 0–5)
EPITH CASTS URNS QL MICRO: ABNORMAL /LPF (ref 0–5)
ERYTHROCYTE [DISTWIDTH] IN BLOOD BY AUTOMATED COUNT: 14.5 % (ref 11.6–14.5)
GLOBULIN SER CALC-MCNC: 4.9 G/DL (ref 2–4)
GLUCOSE SERPL-MCNC: 121 MG/DL (ref 74–99)
GLUCOSE UR STRIP.AUTO-MCNC: NEGATIVE MG/DL
GRAN CASTS URNS QL MICRO: ABNORMAL /LPF
HCG UR QL: NEGATIVE
HCT VFR BLD AUTO: 38.7 % (ref 35–45)
HGB BLD-MCNC: 13.2 G/DL (ref 12–16)
HGB UR QL STRIP: ABNORMAL
KETONES UR QL STRIP.AUTO: 80 MG/DL
LEUKOCYTE ESTERASE UR QL STRIP.AUTO: NEGATIVE
LIPASE SERPL-CCNC: 160 U/L (ref 73–393)
LYMPHOCYTES # BLD: 1.4 K/UL (ref 0.9–3.6)
LYMPHOCYTES NFR BLD: 12 % (ref 21–52)
MAGNESIUM SERPL-MCNC: 2.2 MG/DL (ref 1.6–2.6)
MCH RBC QN AUTO: 29.4 PG (ref 24–34)
MCHC RBC AUTO-ENTMCNC: 34.1 G/DL (ref 31–37)
MCV RBC AUTO: 86.2 FL (ref 74–97)
MONOCYTES # BLD: 0.4 K/UL (ref 0.05–1.2)
MONOCYTES NFR BLD: 3 % (ref 3–10)
MUCOUS THREADS URNS QL MICRO: ABNORMAL /LPF
NEUTS SEG # BLD: 9.9 K/UL (ref 1.8–8)
NEUTS SEG NFR BLD: 85 % (ref 40–73)
NITRITE UR QL STRIP.AUTO: NEGATIVE
PH UR STRIP: 5.5 [PH] (ref 5–8)
PLATELET # BLD AUTO: 271 K/UL (ref 135–420)
PMV BLD AUTO: 10.1 FL (ref 9.2–11.8)
POTASSIUM SERPL-SCNC: 3.6 MMOL/L (ref 3.5–5.5)
PROT SERPL-MCNC: 9.4 G/DL (ref 6.4–8.2)
PROT UR STRIP-MCNC: 300 MG/DL
RBC # BLD AUTO: 4.49 M/UL (ref 4.2–5.3)
RBC #/AREA URNS HPF: ABNORMAL /HPF (ref 0–5)
SODIUM SERPL-SCNC: 139 MMOL/L (ref 136–145)
SP GR UR REFRACTOMETRY: 1.02 (ref 1–1.03)
UROBILINOGEN UR QL STRIP.AUTO: 1 EU/DL (ref 0.2–1)
WBC # BLD AUTO: 11.7 K/UL (ref 4.6–13.2)
WBC URNS QL MICRO: ABNORMAL /HPF (ref 0–4)

## 2019-05-12 PROCEDURE — 96374 THER/PROPH/DIAG INJ IV PUSH: CPT

## 2019-05-12 PROCEDURE — 85025 COMPLETE CBC W/AUTO DIFF WBC: CPT

## 2019-05-12 PROCEDURE — 99283 EMERGENCY DEPT VISIT LOW MDM: CPT

## 2019-05-12 PROCEDURE — 83690 ASSAY OF LIPASE: CPT

## 2019-05-12 PROCEDURE — 96376 TX/PRO/DX INJ SAME DRUG ADON: CPT

## 2019-05-12 PROCEDURE — 81001 URINALYSIS AUTO W/SCOPE: CPT

## 2019-05-12 PROCEDURE — 80053 COMPREHEN METABOLIC PANEL: CPT

## 2019-05-12 PROCEDURE — 83735 ASSAY OF MAGNESIUM: CPT

## 2019-05-12 PROCEDURE — 74011250636 HC RX REV CODE- 250/636: Performed by: PHYSICIAN ASSISTANT

## 2019-05-12 PROCEDURE — 74177 CT ABD & PELVIS W/CONTRAST: CPT

## 2019-05-12 PROCEDURE — 96375 TX/PRO/DX INJ NEW DRUG ADDON: CPT

## 2019-05-12 PROCEDURE — 74011636320 HC RX REV CODE- 636/320: Performed by: EMERGENCY MEDICINE

## 2019-05-12 PROCEDURE — 81025 URINE PREGNANCY TEST: CPT

## 2019-05-12 RX ORDER — ONDANSETRON 2 MG/ML
4 INJECTION INTRAMUSCULAR; INTRAVENOUS
Status: COMPLETED | OUTPATIENT
Start: 2019-05-12 | End: 2019-05-12

## 2019-05-12 RX ORDER — ONDANSETRON 4 MG/1
TABLET, ORALLY DISINTEGRATING ORAL
Qty: 10 TAB | Refills: 0 | OUTPATIENT
Start: 2019-05-12 | End: 2019-12-12

## 2019-05-12 RX ORDER — MORPHINE SULFATE 4 MG/ML
4 INJECTION INTRAVENOUS
Status: COMPLETED | OUTPATIENT
Start: 2019-05-12 | End: 2019-05-12

## 2019-05-12 RX ORDER — CEPHALEXIN 500 MG/1
500 CAPSULE ORAL 2 TIMES DAILY
Qty: 14 CAP | Refills: 0 | Status: SHIPPED | OUTPATIENT
Start: 2019-05-12 | End: 2019-05-19

## 2019-05-12 RX ADMIN — IOPAMIDOL 80 ML: 612 INJECTION, SOLUTION INTRAVENOUS at 10:39

## 2019-05-12 RX ADMIN — MORPHINE SULFATE 4 MG: 4 INJECTION INTRAVENOUS at 10:13

## 2019-05-12 RX ADMIN — ONDANSETRON 4 MG: 2 INJECTION INTRAMUSCULAR; INTRAVENOUS at 12:51

## 2019-05-12 RX ADMIN — ONDANSETRON 4 MG: 2 INJECTION INTRAMUSCULAR; INTRAVENOUS at 09:58

## 2019-05-12 NOTE — DISCHARGE INSTRUCTIONS
Patient Education     Please return immediately to the Emergency Room for re-evaluation if you are not improving, develop any new symptoms, or develop worsening of current symptoms! If you have been prescribed a medication and are unable to take this medication for any reason, please return to the Emergency Department for further evaluation! If you have been referred for follow-up to a specialist, but are unable to follow-up and your symptoms are either not improving or are worsening, please return to the Emergency Department for further evaluation! One or more of your blood pressure readings were elevated during this ER visit. Please keep a close watch your blood pressure by checking it regularly and follow-up with your primary doctor for further evaluation and possible treatment. If you are prescribed blood pressure medication, make sure you take it as prescribed. Untreated high blood pressure can lead to many serious health conditions including, but not limited to, stroke, heart failure, kidney failure. Abdominal Pain: Care Instructions  Your Care Instructions    Abdominal pain has many possible causes. Some aren't serious and get better on their own in a few days. Others need more testing and treatment. If your pain continues or gets worse, you need to be rechecked and may need more tests to find out what is wrong. You may need surgery to correct the problem. Don't ignore new symptoms, such as fever, nausea and vomiting, urination problems, pain that gets worse, and dizziness. These may be signs of a more serious problem. Your doctor may have recommended a follow-up visit in the next 8 to 12 hours. If you are not getting better, you may need more tests or treatment. The doctor has checked you carefully, but problems can develop later. If you notice any problems or new symptoms, get medical treatment right away. Follow-up care is a key part of your treatment and safety.  Be sure to make and go to all appointments, and call your doctor if you are having problems. It's also a good idea to know your test results and keep a list of the medicines you take. How can you care for yourself at home? · Rest until you feel better. · To prevent dehydration, drink plenty of fluids, enough so that your urine is light yellow or clear like water. Choose water and other caffeine-free clear liquids until you feel better. If you have kidney, heart, or liver disease and have to limit fluids, talk with your doctor before you increase the amount of fluids you drink. · If your stomach is upset, eat mild foods, such as rice, dry toast or crackers, bananas, and applesauce. Try eating several small meals instead of two or three large ones. · Wait until 48 hours after all symptoms have gone away before you have spicy foods, alcohol, and drinks that contain caffeine. · Do not eat foods that are high in fat. · Avoid anti-inflammatory medicines such as aspirin, ibuprofen (Advil, Motrin), and naproxen (Aleve). These can cause stomach upset. Talk to your doctor if you take daily aspirin for another health problem. When should you call for help? Call 911 anytime you think you may need emergency care. For example, call if:    · You passed out (lost consciousness).     · You pass maroon or very bloody stools.     · You vomit blood or what looks like coffee grounds.     · You have new, severe belly pain.    Call your doctor now or seek immediate medical care if:    · Your pain gets worse, especially if it becomes focused in one area of your belly.     · You have a new or higher fever.     · Your stools are black and look like tar, or they have streaks of blood.     · You have unexpected vaginal bleeding.     · You have symptoms of a urinary tract infection. These may include:  ? Pain when you urinate. ? Urinating more often than usual.  ?  Blood in your urine.     · You are dizzy or lightheaded, or you feel like you may faint.    Watch closely for changes in your health, and be sure to contact your doctor if:    · You are not getting better after 1 day (24 hours). Where can you learn more? Go to http://lenka-radha.info/. Enter N648 in the search box to learn more about \"Abdominal Pain: Care Instructions. \"  Current as of: September 23, 2018  Content Version: 11.9  © 2006-2018 [employer]. Care instructions adapted under license by [employer] (which disclaims liability or warranty for this information). If you have questions about a medical condition or this instruction, always ask your healthcare professional. [place] [address] any warranty or liability for your use of this information. Patient Education        Nausea and Vomiting: Care Instructions  Your Care Instructions    When you are nauseated, you may feel weak and sweaty and notice a lot of saliva in your mouth. Nausea often leads to vomiting. Most of the time you do not need to worry about nausea and vomiting, but they can be signs of other illnesses. Two common causes of nausea and vomiting are stomach flu and food poisoning. Nausea and vomiting from viral stomach flu will usually start to improve within 24 hours. Nausea and vomiting from food poisoning may last from 12 to 48 hours. The doctor has checked you carefully, but problems can develop later. If you notice any problems or new symptoms, get medical treatment right away. Follow-up care is a key part of your treatment and safety. Be sure to make and go to all appointments, and call your doctor if you are having problems. It's also a good idea to know your test results and keep a list of the medicines you take. How can you care for yourself at home? · To prevent dehydration, drink plenty of fluids, enough so that your urine is light yellow or clear like water. Choose water and other caffeine-free clear liquids until you feel better.  If you have kidney, heart, or liver disease and have to limit fluids, talk with your doctor before you increase the amount of fluids you drink. · Rest in bed until you feel better. · When you are able to eat, try clear soups, mild foods, and liquids until all symptoms are gone for 12 to 48 hours. Other good choices include dry toast, crackers, cooked cereal, and gelatin dessert, such as Jell-O. When should you call for help? Call 911 anytime you think you may need emergency care. For example, call if:    · You passed out (lost consciousness).    Call your doctor now or seek immediate medical care if:    · You have symptoms of dehydration, such as:  ? Dry eyes and a dry mouth. ? Passing only a little dark urine. ? Feeling thirstier than usual.     · You have new or worsening belly pain.     · You have a new or higher fever.     · You vomit blood or what looks like coffee grounds.    Watch closely for changes in your health, and be sure to contact your doctor if:    · You have ongoing nausea and vomiting.     · Your vomiting is getting worse.     · Your vomiting lasts longer than 2 days.     · You are not getting better as expected. Where can you learn more? Go to http://lenka-radha.info/. Enter 25 [id number] in the search box to learn more about \"Nausea and Vomiting: Care Instructions. \"  Current as of: September 23, 2018  Content Version: 11.9  © 0210-2233 [employer]. Care instructions adapted under license by [employer] (which disclaims liability or warranty for this information). If you have questions about a medical condition or this instruction, always ask your healthcare professional. [place] [address] any warranty or liability for your use of this information.          Patient Education        Urinary Tract Infection in Women: Care Instructions  Your Care Instructions    A urinary tract infection, or UTI, is a general term for an infection anywhere between the kidneys and the urethra (where urine comes out). Most UTIs are bladder infections. They often cause pain or burning when you urinate. UTIs are caused by bacteria and can be cured with antibiotics. Be sure to complete your treatment so that the infection goes away. Follow-up care is a key part of your treatment and safety. Be sure to make and go to all appointments, and call your doctor if you are having problems. It's also a good idea to know your test results and keep a list of the medicines you take. How can you care for yourself at home? · Take your antibiotics as directed. Do not stop taking them just because you feel better. You need to take the full course of antibiotics. · Drink extra water and other fluids for the next day or two. This may help wash out the bacteria that are causing the infection. (If you have kidney, heart, or liver disease and have to limit fluids, talk with your doctor before you increase your fluid intake.)  · Avoid drinks that are carbonated or have caffeine. They can irritate the bladder. · Urinate often. Try to empty your bladder each time. · To relieve pain, take a hot bath or lay a heating pad set on low over your lower belly or genital area. Never go to sleep with a heating pad in place. To prevent UTIs  · Drink plenty of water each day. This helps you urinate often, which clears bacteria from your system. (If you have kidney, heart, or liver disease and have to limit fluids, talk with your doctor before you increase your fluid intake.)  · Urinate when you need to. · Urinate right after you have sex. · Change sanitary pads often. · Avoid douches, bubble baths, feminine hygiene sprays, and other feminine hygiene products that have deodorants. · After going to the bathroom, wipe from front to back. When should you call for help?   Call your doctor now or seek immediate medical care if:    · Symptoms such as fever, chills, nausea, or vomiting get worse or appear for the first time.     · You have new pain in your back just below your rib cage. This is called flank pain.     · There is new blood or pus in your urine.     · You have any problems with your antibiotic medicine.    Watch closely for changes in your health, and be sure to contact your doctor if:    · You are not getting better after taking an antibiotic for 2 days.     · Your symptoms go away but then come back. Where can you learn more? Go to http://lenka-radha.info/. Enter R410 in the search box to learn more about \"Urinary Tract Infection in Women: Care Instructions. \"  Current as of: March 20, 2018  Content Version: 11.9  © 0684-7572 [employer]. Care instructions adapted under license by [employer] (which disclaims liability or warranty for this information). If you have questions about a medical condition or this instruction, always ask your healthcare professional. [address] any warranty or liability for your use of this information.

## 2019-05-12 NOTE — ED NOTES
Back from CT; pain reassessed. Pt. States \"I'm feeling a little bit better\" rates pain a 5/10. Warm blankets given for comfort. Siderails up and CBWR.

## 2019-05-12 NOTE — ED PROVIDER NOTES
EMERGENCY DEPARTMENT HISTORY AND PHYSICAL EXAM    11:08 AM      Date: 5/12/2019  Patient Name: Kait Matias    History of Presenting Illness     Chief Complaint   Patient presents with    Vomiting         History Provided By: Patient    Chief Complaint: Nausea and vomiting, abdominal pain  Duration: 1 Days  Timing:  Acute  Location: epigastrium  Quality: Aching  Severity: Severe  Modifying Factors: none  Associated Symptoms: denies any other associated signs or symptoms      Additional History (Context):Tracy Cates is a 45 y.o. female who presents to the emergency department for evaluation of epigastric abdominal pain with associated nausea and vomiting x1 day. Patient reports vomiting is nonbilious. No lower abdominal pain, constipation, or diarrhea. Patient denies possibility of pregnancy and denies any recent urinary symptoms. She does report her legs are aching bilaterally. No recent ill contacts, consumption of raw or undercooked food, or illicit drug use. Patient does admit to EtOH intake few nights ago, but states it was not excessive and was not unusual.  No recent fevers, chills, chest pain, shortness of breath, leg swelling, rash. PCP:  Chio Rivera MD      Past History     Past Medical History:  History reviewed. No pertinent past medical history. Past Surgical History:  Past Surgical History:   Procedure Laterality Date    HX BREAST BIOPSY  6/2006    Right-benign    HX OTHER SURGICAL  1/2013    left neck cyst removal       Family History:  Family History   Problem Relation Age of Onset    Hypertension Mother     Hypertension Father     Heart Disease Father        Social History:  Social History     Tobacco Use    Smoking status: Current Every Day Smoker     Packs/day: 0.50     Years: 10.00     Pack years: 5.00    Smokeless tobacco: Never Used   Substance Use Topics    Alcohol use:  Yes     Alcohol/week: 0.0 oz    Drug use: Yes     Types: Marijuana Allergies:  No Known Allergies      Review of Systems     Review of Systems   Constitutional: Negative for chills and fever. HENT: Negative for congestion, rhinorrhea and sore throat. Respiratory: Negative for cough and shortness of breath. Cardiovascular: Negative for chest pain. Gastrointestinal: Positive for abdominal pain, nausea and vomiting. Negative for blood in stool, constipation and diarrhea. Genitourinary: Negative for dysuria, frequency and hematuria. Musculoskeletal: Negative for back pain and myalgias. Skin: Negative for rash and wound. Neurological: Negative for dizziness and headaches. All other systems reviewed and are negative. Physical Exam     Visit Vitals  BP (!) 176/105 (BP 1 Location: Left arm, BP Patient Position: At rest)   Pulse 93   Temp 97.4 °F (36.3 °C)   Resp 14   Ht 5' 5\" (1.651 m)   Wt 86.2 kg (190 lb)   SpO2 100%   BMI 31.62 kg/m²       Physical Exam   Constitutional: She is oriented to person, place, and time. She appears well-developed and well-nourished. No distress. HENT:   Head: Normocephalic and atraumatic. Nose: Nose normal.   Mouth/Throat: Oropharynx is clear and moist. No oropharyngeal exudate. Unremarkable HEENT exam   Eyes: Pupils are equal, round, and reactive to light. Conjunctivae and EOM are normal. Right eye exhibits no discharge. Left eye exhibits no discharge. Neck: Normal range of motion. Neck supple. No thyromegaly present. Cardiovascular: Normal rate, regular rhythm and normal heart sounds. Pulmonary/Chest: Effort normal and breath sounds normal. No respiratory distress. She has no wheezes. She has no rales. She exhibits no tenderness. Lungs are clear to auscultation bilaterally   Abdominal: Soft. Bowel sounds are normal. She exhibits no distension. There is tenderness. There is no rebound and no guarding. TTP epigastrium without rebound, rigidity, or guarding. Musculoskeletal: She exhibits no edema or deformity. Lymphadenopathy:     She has no cervical adenopathy. Neurological: She is alert and oriented to person, place, and time. She has normal reflexes. No cranial nerve deficit. Skin: Skin is warm and dry. No rash noted. She is not diaphoretic. Psychiatric: She has a normal mood and affect. Nursing note and vitals reviewed. Diagnostic Study Results     Labs -  Recent Results (from the past 12 hour(s))   URINALYSIS W/ RFLX MICROSCOPIC    Collection Time: 05/12/19  9:21 AM   Result Value Ref Range    Color YELLOW      Appearance CLOUDY      Specific gravity 1.025 1.005 - 1.030      pH (UA) 5.5 5.0 - 8.0      Protein 300 (A) NEG mg/dL    Glucose NEGATIVE  NEG mg/dL    Ketone 80 (A) NEG mg/dL    Bilirubin NEGATIVE  NEG      Blood LARGE (A) NEG      Urobilinogen 1.0 0.2 - 1.0 EU/dL    Nitrites NEGATIVE  NEG      Leukocyte Esterase NEGATIVE  NEG     HCG URINE, QL    Collection Time: 05/12/19  9:21 AM   Result Value Ref Range    HCG urine, QL NEGATIVE  NEG     URINE MICROSCOPIC ONLY    Collection Time: 05/12/19  9:21 AM   Result Value Ref Range    WBC 4 to 6 0 - 4 /hpf    RBC 25 to 35 0 - 5 /hpf    Epithelial cells 3+ 0 - 5 /lpf    Bacteria 1+ (A) NEG /hpf    Mucus 1+ (A) NEG /lpf    Granular cast 0 to 1 NEG /lpf   CBC WITH AUTOMATED DIFF    Collection Time: 05/12/19  9:44 AM   Result Value Ref Range    WBC 11.7 4.6 - 13.2 K/uL    RBC 4.49 4.20 - 5.30 M/uL    HGB 13.2 12.0 - 16.0 g/dL    HCT 38.7 35.0 - 45.0 %    MCV 86.2 74.0 - 97.0 FL    MCH 29.4 24.0 - 34.0 PG    MCHC 34.1 31.0 - 37.0 g/dL    RDW 14.5 11.6 - 14.5 %    PLATELET 743 669 - 308 K/uL    MPV 10.1 9.2 - 11.8 FL    NEUTROPHILS 85 (H) 40 - 73 %    LYMPHOCYTES 12 (L) 21 - 52 %    MONOCYTES 3 3 - 10 %    EOSINOPHILS 0 0 - 5 %    BASOPHILS 0 0 - 2 %    ABS. NEUTROPHILS 9.9 (H) 1.8 - 8.0 K/UL    ABS. LYMPHOCYTES 1.4 0.9 - 3.6 K/UL    ABS. MONOCYTES 0.4 0.05 - 1.2 K/UL    ABS. EOSINOPHILS 0.0 0.0 - 0.4 K/UL    ABS.  BASOPHILS 0.0 0.0 - 0.1 K/UL    DF AUTOMATED     METABOLIC PANEL, COMPREHENSIVE    Collection Time: 05/12/19  9:44 AM   Result Value Ref Range    Sodium 139 136 - 145 mmol/L    Potassium 3.6 3.5 - 5.5 mmol/L    Chloride 105 100 - 108 mmol/L    CO2 24 21 - 32 mmol/L    Anion gap 10 3.0 - 18 mmol/L    Glucose 121 (H) 74 - 99 mg/dL    BUN 12 7.0 - 18 MG/DL    Creatinine 0.89 0.6 - 1.3 MG/DL    BUN/Creatinine ratio 13 12 - 20      GFR est AA >60 >60 ml/min/1.73m2    GFR est non-AA >60 >60 ml/min/1.73m2    Calcium 9.6 8.5 - 10.1 MG/DL    Bilirubin, total 0.8 0.2 - 1.0 MG/DL    ALT (SGPT) 35 13 - 56 U/L    AST (SGOT) 24 15 - 37 U/L    Alk. phosphatase 77 45 - 117 U/L    Protein, total 9.4 (H) 6.4 - 8.2 g/dL    Albumin 4.5 3.4 - 5.0 g/dL    Globulin 4.9 (H) 2.0 - 4.0 g/dL    A-G Ratio 0.9 0.8 - 1.7     LIPASE    Collection Time: 05/12/19  9:44 AM   Result Value Ref Range    Lipase 160 73 - 393 U/L   MAGNESIUM    Collection Time: 05/12/19  9:44 AM   Result Value Ref Range    Magnesium 2.2 1.6 - 2.6 mg/dL       Radiologic Studies -   Ct Abd Pelv W Cont    Result Date: 5/12/2019  CT SCAN OF THE ABDOMEN AND PELVIS, WITH CONTRAST INDICATION: Vomiting x2 days, abdominal pain COMPARISON: 5/25/2014 TECHNIQUE: Following intravenous administration of 80 mL Isovue-300  low osmolar contrast, without administration of oral contrast, serial axial CT images through the abdomen and pelvis were obtained using helical technique. Additional coronal and sagittal reformation images were also performed. All CT scans at this facility are performed using dose optimization technique as appropriate to the performed exam, to include automated exposure control, adjustment of the mA and/or kV according to patient's size (Including appropriate matching for site-specific examinations), or use of iterative reconstruction technique. FINDINGS: Visualized portions of lung bases are clear. The liver, spleen, gallbladder, pancreas, and adrenal glands appear unremarkable.  The kidneys enhance symmetrically. No evidence of hydronephrosis or hydroureter. No evidence of perinephric stranding. No nephrolithiasis or renal mass identified. The bowel is non-dilated without evidence of bowel obstruction. The cecum is low-lying in the pelvis. Appendix unremarkable, without evidence of appendicitis. A few colonic diverticula, without CT evidence of acute diverticulitis. The abdominal aorta enhances normally without evidence of abdominal aortic aneurysm. Mild aortoiliac atherosclerotic changes noted. No definite evidence of pathologic lymph node enlargement is seen. Hypodensities in the ovarian/adnexal regions, larger on the left side compared to right, measuring approximately 2.5 cm on the left, likely ovarian/adnexal cysts, possibly physiologic. Ovarian cysts have been further assessed previously on ultrasound examinations of 2/8/2016 and 4/29/2017. If there is clinical concern for endometriosis, nonemergent pelvic MRI could be obtained for further assessment as noted in the pelvic ultrasound report of 4/29/2017. On review of bone windows, no acute fractures or destructive bone lesions are seen. There is partial fusion or incomplete segmentation with rudimentary disc space at T9-10, and notable disc space narrowing with degenerative spondylosis at T11-12, similar to prior. Focal calcific density in the subcutaneous fat of the right buttock, likely a calcified buttock granuloma. Impression: No evidence of bowel obstruction or acute appendicitis. Bilateral ovarian/adnexal hypodensities, left larger than right, likely cysts, approximately 2.5 cm on the left, as described. Otherwise chronic findings as above. Medical Decision Making   I am the first provider for this patient. I reviewed the vital signs, available nursing notes, past medical history, past surgical history, family history and social history. Vital Signs-Reviewed the patient's vital signs.     Pulse Oximetry Analysis -  100% on room air (Interpretation)    Records Reviewed: Nursing Notes and Old Medical Records (Time of Review: 11:08 AM)    ED Course: Progress Notes, Reevaluation, and Consults:      Provider Notes (Medical Decision Making):   differential diagnosis: Gastritis, gastroenteritis, UTI, peptic ulcer disease, GERD    Plan: Patient presents ambulatory in no acute distress with tenderness to palpation over epigastrium, but otherwise unremarkable abdominal exam.  No peritoneal signs. Labs are remarkable for mild UTI and shift, but no overall leukocytosis. CT abdomen pelvis does not reveal anything acute to account for patient's vomiting and pain. Patient treated here with Zofran, fluids, morphine and is feeling much better. Will discharge patient home with Zofran and Keflex. Follow-up with PCP. At this time, patient is stable and appropriate for discharge home. Patient demonstrates understanding of current diagnoses and is in agreement with the treatment plan. They are advised that while the likelihood of serious underlying condition is low at this point given the evaluation performed today, we cannot fully rule it out. They are advised to immediately return with any new symptoms or worsening of current condition. All questions have been answered. Patient is given educational material regarding their diagnoses, including danger symptoms and when to return to the ED. Diagnosis     Clinical Impression:   1. Non-intractable vomiting with nausea, unspecified vomiting type    2. Abdominal pain, epigastric    3.  Acute cystitis with hematuria        Disposition: DC Home    Follow-up Information     Follow up With Specialties Details Why Suzzanna Phoenix, MD General Practice Call in 2 days As needed Juli Galvez [facility] [id number]      [facility] EMERGENCY DEPT Emergency Medicine Go to As needed, If symptoms worsen [address] [place] [id number]  [phone] Patient's Medications   Start Taking    CEPHALEXIN (KEFLEX) 500 MG CAPSULE    Take 1 Cap by mouth two (2) times a day for 7 days. ONDANSETRON (ZOFRAN ODT) 4 MG DISINTEGRATING TABLET    Take 1-2 tablets every 6-8 hours as needed for nausea and vomiting.    Continue Taking    No medications on file   These Medications have changed    No medications on file   Stop Taking    No medications on file     _______________________________

## 2019-07-01 ENCOUNTER — HOSPITAL ENCOUNTER (EMERGENCY)
Age: 44
Discharge: HOME | End: 2019-07-02
Attending: EMERGENCY MEDICINE
Payer: SELF-PAY

## 2019-07-01 VITALS
RESPIRATION RATE: 16 BRPM | TEMPERATURE: 98.1 F | SYSTOLIC BLOOD PRESSURE: 148 MMHG | DIASTOLIC BLOOD PRESSURE: 96 MMHG | OXYGEN SATURATION: 96 % | HEART RATE: 75 BPM

## 2019-07-01 DIAGNOSIS — N94.6 MENSTRUAL CRAMPS: Primary | ICD-10-CM

## 2019-07-01 LAB
APPEARANCE UR: CLEAR
BACTERIA URNS QL MICRO: NEGATIVE /HPF
BILIRUB UR QL: NEGATIVE
COLOR UR: YELLOW
EPITH CASTS URNS QL MICRO: NORMAL /LPF (ref 0–5)
GLUCOSE UR STRIP.AUTO-MCNC: NEGATIVE MG/DL
HCG UR QL: NEGATIVE
HGB UR QL STRIP: ABNORMAL
KETONES UR QL STRIP.AUTO: NEGATIVE MG/DL
LEUKOCYTE ESTERASE UR QL STRIP.AUTO: ABNORMAL
NITRITE UR QL STRIP.AUTO: NEGATIVE
PH UR STRIP: 6 [PH] (ref 5–8)
PROT UR STRIP-MCNC: ABNORMAL MG/DL
RBC #/AREA URNS HPF: NORMAL /HPF (ref 0–5)
SP GR UR REFRACTOMETRY: 1.02 (ref 1–1.03)
UROBILINOGEN UR QL STRIP.AUTO: 1 EU/DL (ref 0.2–1)
WBC URNS QL MICRO: NORMAL /HPF (ref 0–4)

## 2019-07-01 PROCEDURE — 81025 URINE PREGNANCY TEST: CPT

## 2019-07-01 PROCEDURE — 99283 EMERGENCY DEPT VISIT LOW MDM: CPT

## 2019-07-01 PROCEDURE — 81001 URINALYSIS AUTO W/SCOPE: CPT

## 2019-07-01 NOTE — LETTER
NOTIFICATION RETURN TO WORK / SCHOOL 
 
7/2/2019 2:15 AM 
 
Ms. Dayana Pace [id number] [address] [place] [id number] To Whom It May Concern: 
 
Dayana Pace is currently under the care of [facility]. She will return to work/school on: 7-2-19 If there are questions or concerns please have the patient contact our office. Sincerely, Kathie PORTILLO, HEMANTHC

## 2019-07-02 RX ORDER — IBUPROFEN 800 MG/1
800 TABLET ORAL
Qty: 20 TAB | Refills: 0 | Status: SHIPPED | OUTPATIENT
Start: 2019-07-02 | End: 2019-07-09

## 2019-07-02 NOTE — ED TRIAGE NOTES
Pt c/o cramping in pelvic region, states it started last week when on cycle, went away post cycle, and has started again. Pt also c/o vaginal bleeding when wiping.

## 2019-07-02 NOTE — ED PROVIDER NOTES
Patient presents to the emergency department stating vaginal bleeding. She does not think it is time for her. She is 45years old and is on no birth control patient states that she still gets her period. Patient states that she is having abdominal cramping in the middle of her abdomen no suprapubic area. Patient states vaginal bleeding no discharge patient denies any risk of an STD and does not want checked she denies any fever or dysuria    The history is provided by the patient. No  was used. History reviewed. No pertinent past medical history. Past Surgical History:   Procedure Laterality Date    HX BREAST BIOPSY  6/2006    Right-benign    HX OTHER SURGICAL  1/2013    left neck cyst removal         Family History:   Problem Relation Age of Onset    Hypertension Mother     Hypertension Father     Heart Disease Father        Social History     Socioeconomic History    Marital status: SINGLE     Spouse name: Not on file    Number of children: Not on file    Years of education: Not on file    Highest education level: Not on file   Occupational History    Not on file   Social Needs    Financial resource strain: Not on file    Food insecurity:     Worry: Not on file     Inability: Not on file    Transportation needs:     Medical: Not on file     Non-medical: Not on file   Tobacco Use    Smoking status: Current Every Day Smoker     Packs/day: 0.50     Years: 10.00     Pack years: 5.00    Smokeless tobacco: Never Used   Substance and Sexual Activity    Alcohol use:  Yes     Alcohol/week: 0.0 oz    Drug use: Yes     Types: Marijuana    Sexual activity: Yes     Partners: Male     Birth control/protection: Condom   Lifestyle    Physical activity:     Days per week: Not on file     Minutes per session: Not on file    Stress: Not on file   Relationships    Social connections:     Talks on phone: Not on file     Gets together: Not on file     Attends [employer] service: Not on file     Active member of club or organization: Not on file     Attends meetings of clubs or organizations: Not on file     Relationship status: Not on file    Intimate partner violence:     Fear of current or ex partner: Not on file     Emotionally abused: Not on file     Physically abused: Not on file     Forced sexual activity: Not on file   Other Topics Concern    Not on file   Social History Narrative    Not on file         ALLERGIES: Patient has no known allergies. Review of Systems   Constitutional: Negative for fever. Gastrointestinal: Positive for abdominal pain. Negative for blood in stool, diarrhea, nausea, rectal pain and vomiting. Genitourinary: Positive for menstrual problem, pelvic pain and vaginal bleeding. Negative for dysuria, flank pain, frequency and vaginal pain. All other systems reviewed and are negative. Vitals:    07/01/19 2146   BP: (!) 148/96   Pulse: 75   Resp: 16   Temp: 98.1 °F (36.7 °C)   SpO2: 96%            Physical Exam   Constitutional: She is oriented to person, place, and time. She appears well-developed and well-nourished. HENT:   Head: Normocephalic and atraumatic. Eyes: Pupils are equal, round, and reactive to light. Conjunctivae and EOM are normal.   Neck: Normal range of motion. Neck supple. Cardiovascular: Normal rate and regular rhythm. Pulmonary/Chest: Effort normal and breath sounds normal.   Abdominal: Soft. Bowel sounds are normal. There is tenderness. Mild suprapubic tenderness   Genitourinary:   Genitourinary Comments: Patient refused a pelvic exam   Musculoskeletal: Normal range of motion. Neurological: She is alert and oriented to person, place, and time. She has normal reflexes. Skin: Skin is warm and dry. Psychiatric: She has a normal mood and affect. Her behavior is normal. Judgment and thought content normal.   Nursing note and vitals reviewed.        MDM  Number of Diagnoses or Management Options  Menstrual cramps: minor  Diagnosis management comments: I suspect menstrual cramps with possible premenopausal symptoms. Patient's UA is negative for any infection she denied any risk of STDs. I suggested that we do a pelvic exam.  To assess her vaginal bleeding further and evaluate her pelvic pain.   Patient adamantly refused a pelvic exam.  I explained to patient that I could not finish my evaluation without a pelvic exam.  She still adamantly refused and I told patient that I would treat her for menstrual cramping and have her follow-up with her OB/GYN and her primary care doctor and if she is worse to return if needed       Amount and/or Complexity of Data Reviewed  Clinical lab tests: ordered and reviewed  Review and summarize past medical records: yes  Independent visualization of images, tracings, or specimens: yes    Risk of Complications, Morbidity, and/or Mortality  Presenting problems: moderate  Diagnostic procedures: moderate  Management options: moderate    Patient Progress  Patient progress: improved         Procedures            Vitals:  Patient Vitals for the past 12 hrs:   Temp Pulse Resp BP SpO2   07/01/19 2146 98.1 °F (36.7 °C) 75 16 (!) 148/96 96 %       Medications ordered:   Medications - No data to display      Lab findings:  Recent Results (from the past 12 hour(s))   HCG URINE, QL    Collection Time: 07/01/19  9:45 PM   Result Value Ref Range    HCG urine, QL NEGATIVE  NEG     URINALYSIS W/ RFLX MICROSCOPIC    Collection Time: 07/01/19  9:45 PM   Result Value Ref Range    Color YELLOW      Appearance CLEAR      Specific gravity 1.021 1.005 - 1.030      pH (UA) 6.0 5.0 - 8.0      Protein TRACE (A) NEG mg/dL    Glucose NEGATIVE  NEG mg/dL    Ketone NEGATIVE  NEG mg/dL    Bilirubin NEGATIVE  NEG      Blood LARGE (A) NEG      Urobilinogen 1.0 0.2 - 1.0 EU/dL    Nitrites NEGATIVE  NEG      Leukocyte Esterase TRACE (A) NEG     URINE MICROSCOPIC ONLY    Collection Time: 07/01/19  9:45 PM   Result Value Ref Range WBC 0 to 3 0 - 4 /hpf    RBC 4 to 10 0 - 5 /hpf    Epithelial cells 2+ 0 - 5 /lpf    Bacteria NEGATIVE  NEG /hpf          Disposition:    Diagnosis:   1. Menstrual cramps        Disposition: to Home      Follow-up Information     Follow up With Specialties Details Why Charlie Manzo MD General Practice In 2 days  [address]  [phone]             Patient's Medications   Start Taking    IBUPROFEN (MOTRIN) 800 MG TABLET    Take 1 Tab by mouth every six (6) hours as needed for Pain for up to 7 days. Continue Taking    ONDANSETRON (ZOFRAN ODT) 4 MG DISINTEGRATING TABLET    Take 1-2 tablets every 6-8 hours as needed for nausea and vomiting. These Medications have changed    No medications on file   Stop Taking    No medications on file       Return to the ER if you are unable to obtain referral as directed. Jono Pierson Bladimir's  results have been reviewed with her. She has been counseled regarding her diagnosis, treatment, and plan. She verbally conveys understanding and agreement of the signs, symptoms, diagnosis, treatment and prognosis and additionally agrees to follow up as discussed. She also agrees with the care-plan and conveys that all of her questions have been answered. I have also provided discharge instructions for her that include: educational information regarding their diagnosis and treatment, and list of reasons why they would want to return to the ED prior to their follow-up appointment, should her condition change. Leticia Leos ENP-C,FNP-C      Dragon voice recognition was used to generate this report, which may have resulted in some phonetic based errors in grammar and contents.  Even though attempts were made to correct all the mistakes, some may have been missed, and remained in the body of the document

## 2019-07-02 NOTE — DISCHARGE INSTRUCTIONS
Patient Education        Painful Menstrual Cramps: Care Instructions  Your Care Instructions    Painful menstrual cramps are very common. Many women go to the doctor because of bad cramps when they get their period. You may have cramps in your back, thighs, and belly. You may also have diarrhea, constipation, or nausea. Some women also get dizzy. Pain medicine and home treatment can help you feel better. Follow-up care is a key part of your treatment and safety. Be sure to make and go to all appointments, and call your doctor if you are having problems. It's also a good idea to know your test results and keep a list of the medicines you take. How can you care for yourself at home? · Take anti-inflammatory medicines for pain. Ibuprofen (Advil, Motrin) and naproxen (Aleve) usually work better than aspirin. ? Be safe with medicines. Talk to your doctor or pharmacist before you take any of these medicines. They may not be safe if you take other medicines or have other health problems. ? Start taking the recommended dose of pain medicine as soon as you start to feel pain. Or you can start on the day before your period. Keep taking the medicine for as many days as you have cramps. ? If anti-inflammatory medicines don't help, try acetaminophen (Tylenol). ? Do not take two or more pain medicines at the same time unless the doctor told you to. Many pain medicines have acetaminophen, which is Tylenol. Too much acetaminophen (Tylenol) can be harmful. ? Read and follow all instructions on the label. · Put a heating pad set on low or a hot water bottle on your belly. Or take a warm bath. Heat improves blood flow and may help with pain. · Lie down and put a pillow under your knees. Or lie on your side and bring your knees up to your chest. This will help with any back pressure. · Get at least 30 minutes of exercise on most days of the week. This improves blood flow and may decrease pain. Walking is a good choice.  You also may want to do other activities, such as running, swimming, cycling, or playing tennis or team sports. When should you call for help? Call your doctor now or seek immediate medical care if:    · You have new or worse belly or pelvic pain.     · You have severe vaginal bleeding.    Watch closely for changes in your health, and be sure to contact your doctor if:    · You have unusual vaginal bleeding.     · You do not get better as expected. Where can you learn more? Go to http://lenka-radha.info/. Enter [id number] in the search box to learn more about \"Painful Menstrual Cramps: Care Instructions. \"  Current as of: May 14, 2018  Content Version: 11.9  © 5081-2050 [employer]. Care instructions adapted under license by [employer] (which disclaims liability or warranty for this information). If you have questions about a medical condition or this instruction, always ask your healthcare professional. [address] any warranty or liability for your use of this information.

## 2019-07-03 ENCOUNTER — OFFICE VISIT (OUTPATIENT)
Dept: FAMILY MEDICINE CLINIC | Age: 44
End: 2019-07-03

## 2019-07-03 ENCOUNTER — HOSPITAL ENCOUNTER (OUTPATIENT)
Dept: LAB | Age: 44
Discharge: HOME | End: 2019-07-03

## 2019-07-03 VITALS
WEIGHT: 180.8 LBS | TEMPERATURE: 98.3 F | BODY MASS INDEX: 30.12 KG/M2 | DIASTOLIC BLOOD PRESSURE: 102 MMHG | SYSTOLIC BLOOD PRESSURE: 158 MMHG | RESPIRATION RATE: 20 BRPM | HEART RATE: 68 BPM | OXYGEN SATURATION: 98 % | HEIGHT: 65 IN

## 2019-07-03 DIAGNOSIS — Z12.39 BREAST CANCER SCREENING: ICD-10-CM

## 2019-07-03 DIAGNOSIS — I10 ESSENTIAL HYPERTENSION: ICD-10-CM

## 2019-07-03 DIAGNOSIS — Z76.89 ENCOUNTER TO ESTABLISH CARE: Primary | ICD-10-CM

## 2019-07-03 DIAGNOSIS — N92.1 MENORRHAGIA WITH IRREGULAR CYCLE: ICD-10-CM

## 2019-07-03 DIAGNOSIS — Z76.89 ENCOUNTER TO ESTABLISH CARE: ICD-10-CM

## 2019-07-03 LAB
ALBUMIN SERPL-MCNC: 3.7 G/DL (ref 3.4–5)
ALBUMIN/GLOB SERPL: 0.9 {RATIO} (ref 0.8–1.7)
ALP SERPL-CCNC: 71 U/L (ref 45–117)
ALT SERPL-CCNC: 23 U/L (ref 13–56)
AMPHET UR QL SCN: NEGATIVE
ANION GAP SERPL CALC-SCNC: 5 MMOL/L (ref 3–18)
APPEARANCE UR: CLEAR
AST SERPL-CCNC: 15 U/L (ref 15–37)
BACTERIA URNS QL MICRO: ABNORMAL /HPF
BARBITURATES UR QL SCN: NEGATIVE
BASOPHILS # BLD: 0 K/UL (ref 0–0.1)
BASOPHILS NFR BLD: 0 % (ref 0–2)
BENZODIAZ UR QL: NEGATIVE
BILIRUB SERPL-MCNC: 0.3 MG/DL (ref 0.2–1)
BILIRUB UR QL: NEGATIVE
BUN SERPL-MCNC: 9 MG/DL (ref 7–18)
BUN/CREAT SERPL: 13 (ref 12–20)
CALCIUM SERPL-MCNC: 8.5 MG/DL (ref 8.5–10.1)
CANNABINOIDS UR QL SCN: POSITIVE
CHLORIDE SERPL-SCNC: 107 MMOL/L (ref 100–108)
CHOLEST SERPL-MCNC: 235 MG/DL
CO2 SERPL-SCNC: 27 MMOL/L (ref 21–32)
COCAINE UR QL SCN: NEGATIVE
COLOR UR: YELLOW
CREAT SERPL-MCNC: 0.71 MG/DL (ref 0.6–1.3)
DIFFERENTIAL METHOD BLD: ABNORMAL
EOSINOPHIL # BLD: 0.1 K/UL (ref 0–0.4)
EOSINOPHIL NFR BLD: 1 % (ref 0–5)
EPITH CASTS URNS QL MICRO: ABNORMAL /LPF (ref 0–5)
ERYTHROCYTE [DISTWIDTH] IN BLOOD BY AUTOMATED COUNT: 15.5 % (ref 11.6–14.5)
EST. AVERAGE GLUCOSE BLD GHB EST-MCNC: 111 MG/DL
GLOBULIN SER CALC-MCNC: 4 G/DL (ref 2–4)
GLUCOSE SERPL-MCNC: 95 MG/DL (ref 74–99)
GLUCOSE UR STRIP.AUTO-MCNC: NEGATIVE MG/DL
HBA1C MFR BLD: 5.5 % (ref 4.2–5.6)
HCG UR QL: NEGATIVE
HCT VFR BLD AUTO: 35.4 % (ref 35–45)
HDLC SERPL-MCNC: 50 MG/DL (ref 40–60)
HDLC SERPL: 4.7 {RATIO} (ref 0–5)
HDSCOM,HDSCOM: ABNORMAL
HGB BLD-MCNC: 11.7 G/DL (ref 12–16)
HGB UR QL STRIP: ABNORMAL
KETONES UR QL STRIP.AUTO: ABNORMAL MG/DL
LDLC SERPL CALC-MCNC: 157.2 MG/DL (ref 0–100)
LEUKOCYTE ESTERASE UR QL STRIP.AUTO: ABNORMAL
LIPID PROFILE,FLP: ABNORMAL
LYMPHOCYTES # BLD: 2.4 K/UL (ref 0.9–3.6)
LYMPHOCYTES NFR BLD: 31 % (ref 21–52)
MCH RBC QN AUTO: 28.7 PG (ref 24–34)
MCHC RBC AUTO-ENTMCNC: 33.1 G/DL (ref 31–37)
MCV RBC AUTO: 86.8 FL (ref 74–97)
METHADONE UR QL: NEGATIVE
MONOCYTES # BLD: 0.5 K/UL (ref 0.05–1.2)
MONOCYTES NFR BLD: 7 % (ref 3–10)
MUCOUS THREADS URNS QL MICRO: ABNORMAL /LPF
NEUTS SEG # BLD: 4.7 K/UL (ref 1.8–8)
NEUTS SEG NFR BLD: 61 % (ref 40–73)
NITRITE UR QL STRIP.AUTO: NEGATIVE
OPIATES UR QL: NEGATIVE
PCP UR QL: NEGATIVE
PH UR STRIP: 6.5 [PH] (ref 5–8)
PLATELET # BLD AUTO: 291 K/UL (ref 135–420)
PMV BLD AUTO: 10 FL (ref 9.2–11.8)
POTASSIUM SERPL-SCNC: 4 MMOL/L (ref 3.5–5.5)
PROT SERPL-MCNC: 7.7 G/DL (ref 6.4–8.2)
PROT UR STRIP-MCNC: NEGATIVE MG/DL
RBC # BLD AUTO: 4.08 M/UL (ref 4.2–5.3)
RBC #/AREA URNS HPF: ABNORMAL /HPF (ref 0–5)
SODIUM SERPL-SCNC: 139 MMOL/L (ref 136–145)
SP GR UR REFRACTOMETRY: 1.02 (ref 1–1.03)
TRIGL SERPL-MCNC: 139 MG/DL (ref ?–150)
TSH SERPL DL<=0.05 MIU/L-ACNC: 1.1 UIU/ML (ref 0.36–3.74)
UROBILINOGEN UR QL STRIP.AUTO: 1 EU/DL (ref 0.2–1)
VLDLC SERPL CALC-MCNC: 27.8 MG/DL
WBC # BLD AUTO: 7.6 K/UL (ref 4.6–13.2)
WBC URNS QL MICRO: ABNORMAL /HPF (ref 0–4)

## 2019-07-03 PROCEDURE — 85025 COMPLETE CBC W/AUTO DIFF WBC: CPT

## 2019-07-03 PROCEDURE — 84443 ASSAY THYROID STIM HORMONE: CPT

## 2019-07-03 PROCEDURE — 80074 ACUTE HEPATITIS PANEL: CPT

## 2019-07-03 PROCEDURE — 87491 CHLMYD TRACH DNA AMP PROBE: CPT

## 2019-07-03 PROCEDURE — 80053 COMPREHEN METABOLIC PANEL: CPT

## 2019-07-03 PROCEDURE — 81025 URINE PREGNANCY TEST: CPT

## 2019-07-03 PROCEDURE — 87389 HIV-1 AG W/HIV-1&-2 AB AG IA: CPT

## 2019-07-03 PROCEDURE — 80307 DRUG TEST PRSMV CHEM ANLYZR: CPT

## 2019-07-03 PROCEDURE — 80061 LIPID PANEL: CPT

## 2019-07-03 PROCEDURE — 81001 URINALYSIS AUTO W/SCOPE: CPT

## 2019-07-03 PROCEDURE — 83036 HEMOGLOBIN GLYCOSYLATED A1C: CPT

## 2019-07-03 RX ORDER — HYDROCHLOROTHIAZIDE 12.5 MG/1
12.5 CAPSULE ORAL DAILY
Qty: 30 CAP | Refills: 3 | Status: SHIPPED | OUTPATIENT
Start: 2019-07-03 | End: 2021-10-30

## 2019-07-03 RX ORDER — TRANEXAMIC ACID 650 1/1
1300 TABLET ORAL 3 TIMES DAILY
COMMUNITY
End: 2021-10-30

## 2019-07-03 NOTE — PROGRESS NOTES
07/03/19    PCP: Tressa Chung MD    Chief Complaint   Patient presents with   [address]    Menstrual Problem     menses x 2 in June and came back on 7/1/19        HISTORY OF PRESENT ILLNESS  Stevenson Ryan  is a 45 y.o. female whom presents for Establish Care and Menstrual Problem (menses x 2 in June and came back on 7/1/19)         Patient here to establish care. She was a patient of Dr. Verner Salina. She is here today due to [facility] problems. She reports symptoms have been going on over two years. Reports Multiple periods monthly, lasting for about 5 days. Changes pads every hour and reports they are completely saturation. Affects her going to work and debilitated from pain. She reports she takes ibuprofen or bc powders for the pain and it has been effective. She does have a history of pelvic surgery, reporting a possible clogged tube/ right ovary removal. She also reports a H/o endometriosis and was told she would need a hysterectomy. She is currently on Lysteda for bleeding. She is on her menstrual period currently. She states her last pap was 2017. She is also with multiple elevated BP readings at various Visits. She reports she has never been on any medication management. She denies ETOH use, she is a former smoker. She reports a diet low in sodium and baked foods. Denies CP, HA, Visual changes, or LE Swelling. There are no active problems to display for this patient. Current Outpatient Medications   Medication Sig Dispense Refill    tranexamic acid (LYSTEDA) 650 mg tab tablet Take 1,300 mg by mouth three (3) times daily. Indications: during menses for 5 days      ibuprofen (MOTRIN) 800 mg tablet Take 1 Tab by mouth every six (6) hours as needed for Pain for up to 7 days. 20 Tab 0    ondansetron (ZOFRAN ODT) 4 mg disintegrating tablet Take 1-2 tablets every 6-8 hours as needed for nausea and vomiting.  10 Tab 0     No Known Allergies  No past medical history on file.  Past Surgical History:   Procedure Laterality Date    HX BREAST BIOPSY  2006    Right-benign    HX OTHER SURGICAL  2013    left neck cyst removal    HX OTHER SURGICAL Right 2017    Pelvic surgery possibly ovary     Family History   Problem Relation Age of Onset    Hypertension Mother     Diabetes Mother     Hypertension Father     Heart Disease Father     Stroke Maternal Grandmother     Lung Cancer Maternal Grandmother      Social History     Tobacco Use    Smoking status: Former Smoker     Packs/day: 0.50     Years: 10.00     Pack years: 5.00     Last attempt to quit: 7/3/2017     Years since quittin.0    Smokeless tobacco: Never Used   Substance Use Topics    Alcohol use: Yes     Alcohol/week: 3.6 oz     Types: 6 Glasses of wine per week     Frequency: 2-4 times a month     Drinks per session: 5 or 6     Binge frequency: Monthly       Review of Systems   Constitutional: Negative. HENT: Negative. Eyes: Negative. Respiratory: Negative. Cardiovascular: Negative. Gastrointestinal: Positive for abdominal pain. Negative for heartburn, nausea and vomiting. Genitourinary: Negative. Skin: Negative. Neurological: Negative. Psychiatric/Behavioral: Negative. Visit Vitals  BP (!) 158/102 (BP 1 Location: Left arm, BP Patient Position: Sitting)   Pulse 68   Temp 98.3 °F (36.8 °C) (Oral)   Resp 20   Ht 5' 5\" (1.651 m)   Wt 180 lb 12.8 oz (82 kg)   SpO2 98%   BMI 30.09 kg/m²       Pain Scale: 0 - No pain/10    Pain Location:      Physical Exam   Constitutional: She is oriented to person, place, and time and well-developed, well-nourished, and in no distress. HENT:   Head: Normocephalic. Eyes: Pupils are equal, round, and reactive to light. Neck: Normal range of motion. Neck supple. Cardiovascular: Normal rate, regular rhythm and normal heart sounds. Pulmonary/Chest: Effort normal and breath sounds normal.   Abdominal: Soft.  Bowel sounds are normal. There is tenderness in the suprapubic area. Neurological: She is alert and oriented to person, place, and time. Skin: Skin is warm and dry. Psychiatric: Mood and affect normal.   Vitals reviewed. ASSESSMENT and PLAN    ICD-10-CM ICD-9-CM    1. Encounter to establish care Z76.89 V65.8 LIPID PANEL      TSH 3RD GENERATION      CBC WITH AUTOMATED DIFF      METABOLIC PANEL, COMPREHENSIVE      HEMOGLOBIN A1C WITH EAG      HIV 1/2 AG/AB, 4TH GENERATION,W RFLX CONFIRM      HEPATITIS PANEL, ACUTE      HCG URINE, QL      DRUG SCREEN, URINE      URINALYSIS W/ RFLX MICROSCOPIC      CHLAMYDIA/NEISSERIA/TRICHOMONAS AMP   2. Breast cancer screening Z12.31 D59.40 REFERRAL TO PUBLIC HEALTH   3. Essential hypertension I10 401.9 LIPID PANEL      URINALYSIS W/ RFLX MICROSCOPIC      hydroCHLOROthiazide (MICROZIDE) 12.5 mg capsule   4. Menorrhagia with irregular cycle N92.1 626.2 REFERRAL TO OBSTETRICS AND GYNECOLOGY     Diagnoses and all orders for this visit:    1. Encounter to establish care  -     LIPID PANEL; Future  -     TSH 3RD GENERATION; Future  -     CBC WITH AUTOMATED DIFF; Future  -     METABOLIC PANEL, COMPREHENSIVE; Future  -     HEMOGLOBIN A1C WITH EAG; Future  -     HIV 1/2 AG/AB, 4TH GENERATION,W RFLX CONFIRM; Future  -     HEPATITIS PANEL, ACUTE; Future  -     HCG URINE, QL; Future  -     DRUG SCREEN, URINE; Future  -     URINALYSIS W/ RFLX MICROSCOPIC; Future  -     CHLAMYDIA/NEISSERIA/TRICHOMONAS AMP; Future    2. Breast cancer screening  -     REFERRAL TO PUBLIC HEALTH    3. Essential hypertension  -     LIPID PANEL; Future  -     URINALYSIS W/ RFLX MICROSCOPIC; Future  -     hydroCHLOROthiazide (MICROZIDE) 12.5 mg capsule; Take 1 Cap by mouth daily. 4. Menorrhagia with irregular cycle  -     REFERRAL TO OBSTETRICS AND GYNECOLOGY          There are no discontinued medications. Written instructions followed our verbal discussion of all information discussed above, pending tests ordered and future goals/plans. Patient expressed understanding of current diagnosis, planned testing, follow up and if needed to contact the office for any questions or concerns prior to the next visit.

## 2019-07-03 NOTE — LETTER
NOTIFICATION RETURN TO WORK / SCHOOL 
 
7/3/2019 9:06 AM 
 
Ms. Stevenson Ryan [id number] [address] [place] [id number] To Whom It May Concern: 
 
Stevenson Ryan is currently under the care of [address] [place] [address]. She will return to work/school on: 7/3/19 If there are questions or concerns please have the patient contact our office.  
 
 
 
Sincerely, 
 
 
Lakshmi Rodriguez NP

## 2019-07-03 NOTE — PROGRESS NOTES
Chief Complaint   Patient presents with   [address]    Menstrual Problem     menses x 2 in June and came back on 7/1/19     1. When and where did you last receive medical care? Yes When: 2017  Dr Adilia Orellana    2. When and where did you last have preventive care such as   Mammogram 2013  pap smears 2017  colon screening Never     3. What is your current living situation (for example, live alone, live in home with immediate family members)? Live with Fiancee    4. Do you have any problems with communication such trouble seeing, hearing, or understanding instructions? No    5. Do you have an advance directive? This is a document that you can give to family members with instructions for how you would want them to make health care decisions for you if you were unable to speak for yourself. (For example, unconscious, delerious)No    PMH/FH/Social Hx reviewed and updated as needed     Applicable screenings reviewed and updated as needed  Medication reconciliation performed. Patient does need medication refills. Health Maintenance reviewed.

## 2019-07-04 LAB
HAV IGM SER QL: NEGATIVE
HBV CORE IGM SER QL: NEGATIVE
HBV SURFACE AG SER QL: <0.1 INDEX
HBV SURFACE AG SER QL: NEGATIVE
HCV AB SER IA-ACNC: 0.09 INDEX
HCV AB SERPL QL IA: NEGATIVE
HCV COMMENT,HCGAC: NORMAL
HIV 1+2 AB+HIV1 P24 AG SERPL QL IA: NONREACTIVE
HIV12 RESULT COMMENT, HHIVC: NORMAL
SP1: NORMAL
SP2: NORMAL
SP3: NORMAL

## 2019-07-05 LAB
C TRACH RRNA SPEC QL NAA+PROBE: NEGATIVE
N GONORRHOEA RRNA SPEC QL NAA+PROBE: NEGATIVE
SPECIMEN SOURCE: NORMAL
T VAGINALIS RRNA VAG QL NAA+PROBE: NEGATIVE

## 2019-07-24 ENCOUNTER — OFFICE VISIT (OUTPATIENT)
Dept: FAMILY MEDICINE CLINIC | Age: 44
End: 2019-07-24

## 2019-07-24 VITALS
HEART RATE: 87 BPM | TEMPERATURE: 97.8 F | OXYGEN SATURATION: 98 % | HEIGHT: 65 IN | DIASTOLIC BLOOD PRESSURE: 84 MMHG | WEIGHT: 176 LBS | RESPIRATION RATE: 20 BRPM | BODY MASS INDEX: 29.32 KG/M2 | SYSTOLIC BLOOD PRESSURE: 138 MMHG

## 2019-07-24 DIAGNOSIS — E78.5 DYSLIPIDEMIA: ICD-10-CM

## 2019-07-24 DIAGNOSIS — F12.10 MARIJUANA ABUSE: ICD-10-CM

## 2019-07-24 DIAGNOSIS — I10 ESSENTIAL HYPERTENSION: Primary | ICD-10-CM

## 2019-07-24 NOTE — PROGRESS NOTES
07/24/19    PCP: Roxane Lisa NP    Chief Complaint   Patient presents with    Follow-up    Hypertension        HISTORY OF PRESENT ILLNESS  Juarez Roberson  is a 45 y.o. female whom presents for Follow-up and Hypertension      HPI  Cardiovascular Review:  The patient has hypertension and hyperlipidemia. Diet and Lifestyle: not attempting to follow a low fat, low cholesterol diet, not attempting to follow a low sodium diet, smoker continues to smoke marijuana,    Home BP Monitoring: is not measured at home. Pertinent ROS: taking medications as instructed, no medication side effects noted, no TIA's, no chest pain on exertion, no dyspnea on exertion, no swelling of ankles. There are no active problems to display for this patient. Current Outpatient Medications   Medication Sig Dispense Refill    pitavastatin calcium (LIVALO) 4 mg tab tablet Take 1 Tab by mouth daily. 90 Tab 3    pitavastatin calcium (LIVALO) 4 mg tab tablet Take 1 Tab by mouth daily. 90 Tab 0    hydroCHLOROthiazide (MICROZIDE) 12.5 mg capsule Take 1 Cap by mouth daily. 30 Cap 3    tranexamic acid (LYSTEDA) 650 mg tab tablet Take 1,300 mg by mouth three (3) times daily. Indications: during menses for 5 days      ondansetron (ZOFRAN ODT) 4 mg disintegrating tablet Take 1-2 tablets every 6-8 hours as needed for nausea and vomiting.  10 Tab 0     No Known Allergies  Past Medical History:   Diagnosis Date    Hypertension      Past Surgical History:   Procedure Laterality Date    HX BREAST BIOPSY  6/2006    Right-benign    HX OTHER SURGICAL  1/2013    left neck cyst removal    HX OTHER SURGICAL Right 03/2017    Pelvic surgery possibly ovary     Family History   Problem Relation Age of Onset    Hypertension Mother     Diabetes Mother     Hypertension Father     Heart Disease Father     Stroke Maternal Grandmother     Lung Cancer Maternal Grandmother      Social History     Tobacco Use    Smoking status: Former Smoker     Packs/day: 0.50     Years: 10.00     Pack years: 5.00     Last attempt to quit: 7/3/2017     Years since quittin.0    Smokeless tobacco: Never Used   Substance Use Topics    Alcohol use: Yes     Alcohol/week: 6.0 standard drinks     Types: 6 Glasses of wine per week     Frequency: 2-4 times a month     Drinks per session: 5 or 6     Binge frequency: Monthly       Review of Systems   Constitutional: Negative. Respiratory: Negative. Cardiovascular: Negative. Visit Vitals  /84 (BP 1 Location: Left arm, BP Patient Position: Sitting)   Pulse 87   Temp 97.8 °F (36.6 °C) (Oral)   Resp 20   Ht 5' 5\" (1.651 m)   Wt 176 lb (79.8 kg)   SpO2 98%   BMI 29.29 kg/m²       Pain Scale: 0 - No pain/10    Pain Location:      Physical Exam   Constitutional: She is oriented to person, place, and time and well-developed, well-nourished, and in no distress. HENT:   Head: Normocephalic. Eyes: Pupils are equal, round, and reactive to light. Cardiovascular: Normal rate, regular rhythm and normal heart sounds. Pulmonary/Chest: Effort normal and breath sounds normal.   Abdominal: Soft. Bowel sounds are normal.   Neurological: She is alert and oriented to person, place, and time. Skin: Skin is warm and dry.        Lab Results   Component Value Date/Time    WBC 7.6 2019 08:43 AM    HGB 11.7 (L) 2019 08:43 AM    HCT 35.4 2019 08:43 AM    PLATELET 393  08:43 AM    MCV 86.8 2019 08:43 AM     Lab Results   Component Value Date/Time    Hemoglobin A1c 5.5 2019 08:43 AM    Glucose 95 2019 08:43 AM    LDL, calculated 157.2 (H) 2019 08:43 AM    Creatinine 0.71 2019 08:43 AM      Lab Results   Component Value Date/Time    Cholesterol, total 235 (H) 2019 08:43 AM    HDL Cholesterol 50 2019 08:43 AM    LDL, calculated 157.2 (H) 2019 08:43 AM    Triglyceride 139 2019 08:43 AM    CHOL/HDL Ratio 4.7 2019 08:43 AM     Lab Results Component Value Date/Time    ALT (SGPT) 23 07/03/2019 08:43 AM    AST (SGOT) 15 07/03/2019 08:43 AM    Alk. phosphatase 71 07/03/2019 08:43 AM    Bilirubin, direct <0.1 05/05/2016 09:30 AM    Bilirubin, total 0.3 07/03/2019 08:43 AM    Albumin 3.7 07/03/2019 08:43 AM    Protein, total 7.7 07/03/2019 08:43 AM    PLATELET 444 10/13/3388 08:43 AM    Hepatitis B surface Ag <0.10 07/03/2019 08:43 AM      Pertinent Radiology reports:     ASSESSMENT and PLAN    ICD-10-CM ICD-9-CM    1. Essential hypertension I10 401.9 pitavastatin calcium (LIVALO) 4 mg tab tablet      pitavastatin calcium (LIVALO) 4 mg tab tablet   2. Dyslipidemia E78.5 272.4 pitavastatin calcium (LIVALO) 4 mg tab tablet      pitavastatin calcium (LIVALO) 4 mg tab tablet     the following changes in treatment are made: Add statin to regimen for hyperlipidemia. reviewed diet, exercise and weight control  cardiovascular risk and specific lipid/LDL goals reviewed  reviewed medications and side effects in detail      There are no discontinued medications. Written instructions followed our verbal discussion of all information discussed above, pending tests ordered and future goals/plans. Patient expressed understanding of current diagnosis, planned testing, follow up and if needed to contact the office for any questions or concerns prior to the next visit.

## 2019-07-24 NOTE — LETTER
7/24/2019 [facility] [address], [address] Kaiton Cooks, 1975, is picking up the following medications ordered from the [facility]: LIVALO 4 MG #90 Linda Wyatt Patient's Signature: _____________________________ Today's Date: 7/24/2019

## 2019-07-24 NOTE — PROGRESS NOTES
Patient scheduled for GYN referral appt on 8/23/19 with [phone] check-In at [facility]. Patient instructed to take photo ID and list of meds to appt. She is pending varsha approval and medicaid pending.

## 2019-08-30 ENCOUNTER — OFFICE VISIT (OUTPATIENT)
Dept: OBGYN CLINIC | Age: 44
End: 2019-08-30

## 2019-08-30 VITALS
HEART RATE: 67 BPM | SYSTOLIC BLOOD PRESSURE: 146 MMHG | DIASTOLIC BLOOD PRESSURE: 95 MMHG | BODY MASS INDEX: 29.82 KG/M2 | HEIGHT: 65 IN | WEIGHT: 179 LBS | RESPIRATION RATE: 18 BRPM | TEMPERATURE: 98 F | OXYGEN SATURATION: 100 %

## 2019-08-30 DIAGNOSIS — N93.9 ABNORMAL UTERINE BLEEDING: Primary | ICD-10-CM

## 2019-08-30 NOTE — PROGRESS NOTES
PATIENT PRESENTS FOR ABNORMAL CYCLES  PATIENT IS UP TO DATE WITH BILL SMEAR AND MAMMOGRAMS , PATIENT IS SEEKING POSSIBLE HYSTERECTOMY \  Visit Vitals  BP (!) 146/95   Pulse 67   Temp 98 °F (36.7 °C)   Resp 18   Ht 5' 5\" (1.651 m)   Wt 179 lb (81.2 kg)   LMP 08/21/2019   SpO2 100%   BMI 29.79 kg/m²      Results for orders placed or performed during the hospital encounter of 07/03/19   LIPID PANEL   Result Value Ref Range    LIPID PROFILE          Cholesterol, total 235 (H) <200 MG/DL    Triglyceride 139 <150 MG/DL    HDL Cholesterol 50 40 - 60 MG/DL    LDL, calculated 157.2 (H) 0 - 100 MG/DL    VLDL, calculated 27.8 MG/DL    CHOL/HDL Ratio 4.7 0 - 5.0     TSH 3RD GENERATION   Result Value Ref Range    TSH 1.10 0.36 - 3.74 uIU/mL   CBC WITH AUTOMATED DIFF   Result Value Ref Range    WBC 7.6 4.6 - 13.2 K/uL    RBC 4.08 (L) 4.20 - 5.30 M/uL    HGB 11.7 (L) 12.0 - 16.0 g/dL    HCT 35.4 35.0 - 45.0 %    MCV 86.8 74.0 - 97.0 FL    MCH 28.7 24.0 - 34.0 PG    MCHC 33.1 31.0 - 37.0 g/dL    RDW 15.5 (H) 11.6 - 14.5 %    PLATELET 016 027 - 208 K/uL    MPV 10.0 9.2 - 11.8 FL    NEUTROPHILS 61 40 - 73 %    LYMPHOCYTES 31 21 - 52 %    MONOCYTES 7 3 - 10 %    EOSINOPHILS 1 0 - 5 %    BASOPHILS 0 0 - 2 %    ABS. NEUTROPHILS 4.7 1.8 - 8.0 K/UL    ABS. LYMPHOCYTES 2.4 0.9 - 3.6 K/UL    ABS. MONOCYTES 0.5 0.05 - 1.2 K/UL    ABS. EOSINOPHILS 0.1 0.0 - 0.4 K/UL    ABS.  BASOPHILS 0.0 0.0 - 0.1 K/UL    DF AUTOMATED     METABOLIC PANEL, COMPREHENSIVE   Result Value Ref Range    Sodium 139 136 - 145 mmol/L    Potassium 4.0 3.5 - 5.5 mmol/L    Chloride 107 100 - 108 mmol/L    CO2 27 21 - 32 mmol/L    Anion gap 5 3.0 - 18 mmol/L    Glucose 95 74 - 99 mg/dL    BUN 9 7.0 - 18 MG/DL    Creatinine 0.71 0.6 - 1.3 MG/DL    BUN/Creatinine ratio 13 12 - 20      GFR est AA >60 >60 ml/min/1.73m2    GFR est non-AA >60 >60 ml/min/1.73m2    Calcium 8.5 8.5 - 10.1 MG/DL    Bilirubin, total 0.3 0.2 - 1.0 MG/DL    ALT (SGPT) 23 13 - 56 U/L    AST (SGOT) 15 15 - 37 U/L    Alk. phosphatase 71 45 - 117 U/L    Protein, total 7.7 6.4 - 8.2 g/dL    Albumin 3.7 3.4 - 5.0 g/dL    Globulin 4.0 2.0 - 4.0 g/dL    A-G Ratio 0.9 0.8 - 1.7     HEMOGLOBIN A1C WITH EAG   Result Value Ref Range    Hemoglobin A1c 5.5 4.2 - 5.6 %    Est. average glucose 111 mg/dL   HIV 1/2 AG/AB, 4TH GENERATION,W RFLX CONFIRM   Result Value Ref Range    HIV 1/2 Interpretation NONREACTIVE NR      HIV 1/2 result comment SEE NOTE     HEPATITIS PANEL, ACUTE   Result Value Ref Range    Hepatitis A, IgM NEGATIVE  NEG      __          Hepatitis B surface Ag <0.10 <1.00 Index    Hep B surface Ag Interp. NEGATIVE  NEG      __          Hepatitis B core, IgM NEGATIVE  NEG      __          Hepatitis C virus Ab 0.09 <0.80 Index    Hep C  virus Ab Interp.  NEGATIVE  NEG      Hep C  virus Ab comment         DRUG SCREEN, URINE   Result Value Ref Range    BENZODIAZEPINES NEGATIVE  NEG      BARBITURATES NEGATIVE  NEG      THC (TH-CANNABINOL) POSITIVE (A) NEG      OPIATES NEGATIVE  NEG      PCP(PHENCYCLIDINE) NEGATIVE  NEG      COCAINE NEGATIVE  NEG      AMPHETAMINES NEGATIVE  NEG      METHADONE NEGATIVE  NEG      HDSCOM (NOTE)    URINALYSIS W/ RFLX MICROSCOPIC   Result Value Ref Range    Color YELLOW      Appearance CLEAR      Specific gravity 1.024 1.005 - 1.030      pH (UA) 6.5 5.0 - 8.0      Protein NEGATIVE  NEG mg/dL    Glucose NEGATIVE  NEG mg/dL    Ketone TRACE (A) NEG mg/dL    Bilirubin NEGATIVE  NEG      Blood LARGE (A) NEG      Urobilinogen 1.0 0.2 - 1.0 EU/dL    Nitrites NEGATIVE  NEG      Leukocyte Esterase TRACE (A) NEG     HCG URINE, QL   Result Value Ref Range    HCG urine, QL NEGATIVE  NEG     URINE MICROSCOPIC ONLY   Result Value Ref Range    WBC 3 to 5 0 - 4 /hpf    RBC TOO NUMEROUS TO COUNT 0 - 5 /hpf    Epithelial cells 2+ 0 - 5 /lpf    Bacteria FEW (A) NEG /hpf    Mucus 1+ (A) NEG /lpf   CT/NG/T.VAGINALIS AMPLIFICATION   Result Value Ref Range    Source URINE      C. trachomatis by ISAÍAS NEGATIVE  NEGATIVE N. gonorrhoeae by ISAÍAS NEGATIVE  NEGATIVE      T. vaginalis by ISAÍAS NEGATIVE  NEGATIVE       30 minutes spent with patient of which  greater than 50 percent spent in counseling. Ultrasound done. Pt. Has uterine fibroids. Will order sonogram and emb for irregular fluid complex collection in uterine cavity.

## 2019-12-11 ENCOUNTER — HOSPITAL ENCOUNTER (EMERGENCY)
Age: 44
Discharge: HOME | End: 2019-12-12
Attending: EMERGENCY MEDICINE
Payer: SELF-PAY

## 2019-12-11 ENCOUNTER — APPOINTMENT (OUTPATIENT)
Dept: CT IMAGING | Age: 44
End: 2019-12-11
Attending: EMERGENCY MEDICINE
Payer: SELF-PAY

## 2019-12-11 DIAGNOSIS — R11.2 NAUSEA AND VOMITING, INTRACTABILITY OF VOMITING NOT SPECIFIED, UNSPECIFIED VOMITING TYPE: Primary | ICD-10-CM

## 2019-12-11 LAB
ALBUMIN SERPL-MCNC: 4.2 G/DL (ref 3.4–5)
ALBUMIN SERPL-MCNC: 4.2 G/DL (ref 3.4–5)
ALBUMIN/GLOB SERPL: 0.8 {RATIO} (ref 0.8–1.7)
ALBUMIN/GLOB SERPL: 0.9 {RATIO} (ref 0.8–1.7)
ALP SERPL-CCNC: 68 U/L (ref 45–117)
ALP SERPL-CCNC: 72 U/L (ref 45–117)
ALT SERPL-CCNC: 31 U/L (ref 13–56)
ALT SERPL-CCNC: 32 U/L (ref 13–56)
ANION GAP SERPL CALC-SCNC: 8 MMOL/L (ref 3–18)
ANION GAP SERPL CALC-SCNC: 9 MMOL/L (ref 3–18)
APPEARANCE UR: CLEAR
AST SERPL-CCNC: 17 U/L (ref 10–38)
AST SERPL-CCNC: 18 U/L (ref 10–38)
BACTERIA URNS QL MICRO: NEGATIVE /HPF
BASOPHILS # BLD: 0 K/UL (ref 0–0.1)
BASOPHILS NFR BLD: 0 % (ref 0–2)
BILIRUB DIRECT SERPL-MCNC: 0.1 MG/DL (ref 0–0.2)
BILIRUB SERPL-MCNC: 0.5 MG/DL (ref 0.2–1)
BILIRUB SERPL-MCNC: 0.5 MG/DL (ref 0.2–1)
BILIRUB UR QL: NEGATIVE
BUN SERPL-MCNC: 10 MG/DL (ref 7–18)
BUN SERPL-MCNC: 9 MG/DL (ref 7–18)
BUN/CREAT SERPL: 12 (ref 12–20)
BUN/CREAT SERPL: 12 (ref 12–20)
CALCIUM SERPL-MCNC: 8.8 MG/DL (ref 8.5–10.1)
CALCIUM SERPL-MCNC: 9.1 MG/DL (ref 8.5–10.1)
CHLORIDE SERPL-SCNC: 107 MMOL/L (ref 100–111)
CHLORIDE SERPL-SCNC: 108 MMOL/L (ref 100–111)
CO2 SERPL-SCNC: 23 MMOL/L (ref 21–32)
CO2 SERPL-SCNC: 24 MMOL/L (ref 21–32)
COLOR UR: YELLOW
CREAT SERPL-MCNC: 0.78 MG/DL (ref 0.6–1.3)
CREAT SERPL-MCNC: 0.83 MG/DL (ref 0.6–1.3)
DIFFERENTIAL METHOD BLD: ABNORMAL
EOSINOPHIL # BLD: 0 K/UL (ref 0–0.4)
EOSINOPHIL NFR BLD: 0 % (ref 0–5)
EPITH CASTS URNS QL MICRO: ABNORMAL /LPF (ref 0–5)
ERYTHROCYTE [DISTWIDTH] IN BLOOD BY AUTOMATED COUNT: 14.4 % (ref 11.6–14.5)
GLOBULIN SER CALC-MCNC: 4.9 G/DL (ref 2–4)
GLOBULIN SER CALC-MCNC: 5.3 G/DL (ref 2–4)
GLUCOSE BLD STRIP.AUTO-MCNC: 162 MG/DL (ref 70–110)
GLUCOSE SERPL-MCNC: 125 MG/DL (ref 74–99)
GLUCOSE SERPL-MCNC: 142 MG/DL (ref 74–99)
GLUCOSE UR STRIP.AUTO-MCNC: NEGATIVE MG/DL
HCG SERPL QL: NEGATIVE
HCT VFR BLD AUTO: 36.8 % (ref 35–45)
HGB BLD-MCNC: 12.4 G/DL (ref 12–16)
HGB UR QL STRIP: ABNORMAL
KETONES UR QL STRIP.AUTO: 15 MG/DL
LEUKOCYTE ESTERASE UR QL STRIP.AUTO: NEGATIVE
LIPASE SERPL-CCNC: 205 U/L (ref 73–393)
LYMPHOCYTES # BLD: 1.4 K/UL (ref 0.9–3.6)
LYMPHOCYTES NFR BLD: 10 % (ref 21–52)
MAGNESIUM SERPL-MCNC: 1.7 MG/DL (ref 1.6–2.6)
MCH RBC QN AUTO: 29.1 PG (ref 24–34)
MCHC RBC AUTO-ENTMCNC: 33.7 G/DL (ref 31–37)
MCV RBC AUTO: 86.4 FL (ref 74–97)
MONOCYTES # BLD: 0.4 K/UL (ref 0.05–1.2)
MONOCYTES NFR BLD: 3 % (ref 3–10)
MUCOUS THREADS URNS QL MICRO: ABNORMAL /LPF
NEUTS SEG # BLD: 12.1 K/UL (ref 1.8–8)
NEUTS SEG NFR BLD: 87 % (ref 40–73)
NITRITE UR QL STRIP.AUTO: NEGATIVE
PH UR STRIP: 6.5 [PH] (ref 5–8)
PLATELET # BLD AUTO: 274 K/UL (ref 135–420)
PMV BLD AUTO: 10 FL (ref 9.2–11.8)
POTASSIUM SERPL-SCNC: 3 MMOL/L (ref 3.5–5.5)
POTASSIUM SERPL-SCNC: 3.4 MMOL/L (ref 3.5–5.5)
PROT SERPL-MCNC: 9.1 G/DL (ref 6.4–8.2)
PROT SERPL-MCNC: 9.5 G/DL (ref 6.4–8.2)
PROT UR STRIP-MCNC: 30 MG/DL
RBC # BLD AUTO: 4.26 M/UL (ref 4.2–5.3)
RBC #/AREA URNS HPF: ABNORMAL /HPF (ref 0–5)
SODIUM SERPL-SCNC: 139 MMOL/L (ref 136–145)
SODIUM SERPL-SCNC: 140 MMOL/L (ref 136–145)
SP GR UR REFRACTOMETRY: 1.02 (ref 1–1.03)
UROBILINOGEN UR QL STRIP.AUTO: 0.2 EU/DL (ref 0.2–1)
WBC # BLD AUTO: 13.9 K/UL (ref 4.6–13.2)
WBC URNS QL MICRO: ABNORMAL /HPF (ref 0–5)

## 2019-12-11 PROCEDURE — 82962 GLUCOSE BLOOD TEST: CPT

## 2019-12-11 PROCEDURE — 85025 COMPLETE CBC W/AUTO DIFF WBC: CPT

## 2019-12-11 PROCEDURE — 74011636320 HC RX REV CODE- 636/320: Performed by: EMERGENCY MEDICINE

## 2019-12-11 PROCEDURE — 81001 URINALYSIS AUTO W/SCOPE: CPT

## 2019-12-11 PROCEDURE — 74177 CT ABD & PELVIS W/CONTRAST: CPT

## 2019-12-11 PROCEDURE — 96376 TX/PRO/DX INJ SAME DRUG ADON: CPT

## 2019-12-11 PROCEDURE — 74011250636 HC RX REV CODE- 250/636: Performed by: PHYSICIAN ASSISTANT

## 2019-12-11 PROCEDURE — 80076 HEPATIC FUNCTION PANEL: CPT

## 2019-12-11 PROCEDURE — 93005 ELECTROCARDIOGRAM TRACING: CPT

## 2019-12-11 PROCEDURE — 80053 COMPREHEN METABOLIC PANEL: CPT

## 2019-12-11 PROCEDURE — 96374 THER/PROPH/DIAG INJ IV PUSH: CPT

## 2019-12-11 PROCEDURE — 99285 EMERGENCY DEPT VISIT HI MDM: CPT

## 2019-12-11 PROCEDURE — 83690 ASSAY OF LIPASE: CPT

## 2019-12-11 PROCEDURE — 84703 CHORIONIC GONADOTROPIN ASSAY: CPT

## 2019-12-11 PROCEDURE — 74011250636 HC RX REV CODE- 250/636: Performed by: EMERGENCY MEDICINE

## 2019-12-11 PROCEDURE — 83735 ASSAY OF MAGNESIUM: CPT

## 2019-12-11 PROCEDURE — 96361 HYDRATE IV INFUSION ADD-ON: CPT

## 2019-12-11 RX ORDER — ONDANSETRON 2 MG/ML
4 INJECTION INTRAMUSCULAR; INTRAVENOUS
Status: COMPLETED | OUTPATIENT
Start: 2019-12-11 | End: 2019-12-11

## 2019-12-11 RX ADMIN — ONDANSETRON 4 MG: 2 INJECTION INTRAMUSCULAR; INTRAVENOUS at 15:34

## 2019-12-11 RX ADMIN — SODIUM CHLORIDE 1000 ML: 900 INJECTION, SOLUTION INTRAVENOUS at 20:22

## 2019-12-11 RX ADMIN — ONDANSETRON 4 MG: 2 INJECTION INTRAMUSCULAR; INTRAVENOUS at 20:14

## 2019-12-11 RX ADMIN — IOPAMIDOL 100 ML: 612 INJECTION, SOLUTION INTRAVENOUS at 22:45

## 2019-12-11 NOTE — ED TRIAGE NOTES
Patient states she was sent home from work today due to feeling nausea. Patient will not sit still long enough to obtain vital signs.

## 2019-12-11 NOTE — ED NOTES
Patient refuses to sit still long enough for the BP cuff to read. She keeps moving her arm and then started to complain because it was inflating so much it was cutting off her circulation.

## 2019-12-11 NOTE — LETTER
NOTIFICATION RETURN TO WORK / SCHOOL 
 
12/12/2019 3:57 AM 
 
Ms. Sangita Elena [id number] [address] [place] [phone] To Whom It May Concern: 
 
Sangita Elena is currently under the care of [facility] She will return to work/school on: 12/16/2019 If there are questions or concerns please have the patient contact our office. Sincerely, Nandini Lovelace

## 2019-12-12 VITALS
WEIGHT: 180 LBS | SYSTOLIC BLOOD PRESSURE: 168 MMHG | HEIGHT: 65 IN | RESPIRATION RATE: 17 BRPM | OXYGEN SATURATION: 100 % | HEART RATE: 69 BPM | BODY MASS INDEX: 29.99 KG/M2 | DIASTOLIC BLOOD PRESSURE: 93 MMHG | TEMPERATURE: 99.7 F

## 2019-12-12 LAB
ATRIAL RATE: 75 BPM
CALCULATED P AXIS, ECG09: 71 DEGREES
CALCULATED R AXIS, ECG10: 58 DEGREES
CALCULATED T AXIS, ECG11: 32 DEGREES
DIAGNOSIS, 93000: NORMAL
P-R INTERVAL, ECG05: 144 MS
Q-T INTERVAL, ECG07: 392 MS
QRS DURATION, ECG06: 72 MS
QTC CALCULATION (BEZET), ECG08: 437 MS
VENTRICULAR RATE, ECG03: 75 BPM

## 2019-12-12 PROCEDURE — 74011250636 HC RX REV CODE- 250/636: Performed by: EMERGENCY MEDICINE

## 2019-12-12 PROCEDURE — 96375 TX/PRO/DX INJ NEW DRUG ADDON: CPT

## 2019-12-12 PROCEDURE — 74011000258 HC RX REV CODE- 258: Performed by: EMERGENCY MEDICINE

## 2019-12-12 RX ORDER — ONDANSETRON 4 MG/1
TABLET, ORALLY DISINTEGRATING ORAL
Qty: 10 TAB | Refills: 0 | Status: SHIPPED | OUTPATIENT
Start: 2019-12-12 | End: 2021-10-30

## 2019-12-12 RX ORDER — PROMETHAZINE HYDROCHLORIDE 50 MG/1
50 SUPPOSITORY RECTAL
Qty: 12 SUPPOSITORY | Refills: 0 | Status: SHIPPED | OUTPATIENT
Start: 2019-12-12 | End: 2021-10-30

## 2019-12-12 RX ADMIN — SODIUM CHLORIDE 12.5 MG: 900 INJECTION, SOLUTION INTRAVENOUS at 00:00

## 2019-12-12 NOTE — DISCHARGE INSTRUCTIONS
Patient Education        Nausea and Vomiting: Care Instructions  Your Care Instructions    When you are nauseated, you may feel weak and sweaty and notice a lot of saliva in your mouth. Nausea often leads to vomiting. Most of the time you do not need to worry about nausea and vomiting, but they can be signs of other illnesses. Two common causes of nausea and vomiting are stomach flu and food poisoning. Nausea and vomiting from viral stomach flu will usually start to improve within 24 hours. Nausea and vomiting from food poisoning may last from 12 to 48 hours. The doctor has checked you carefully, but problems can develop later. If you notice any problems or new symptoms, get medical treatment right away. Follow-up care is a key part of your treatment and safety. Be sure to make and go to all appointments, and call your doctor if you are having problems. It's also a good idea to know your test results and keep a list of the medicines you take. How can you care for yourself at home? · To prevent dehydration, drink plenty of fluids, enough so that your urine is light yellow or clear like water. Choose water and other caffeine-free clear liquids until you feel better. If you have kidney, heart, or liver disease and have to limit fluids, talk with your doctor before you increase the amount of fluids you drink. · Rest in bed until you feel better. · When you are able to eat, try clear soups, mild foods, and liquids until all symptoms are gone for 12 to 48 hours. Other good choices include dry toast, crackers, cooked cereal, and gelatin dessert, such as Jell-O. When should you call for help? Call 911 anytime you think you may need emergency care. For example, call if:    · You passed out (lost consciousness).    Call your doctor now or seek immediate medical care if:    · You have symptoms of dehydration, such as:  ? Dry eyes and a dry mouth. ? Passing only a little dark urine. ?  Feeling thirstier than usual.   · You have new or worsening belly pain.     · You have a new or higher fever.     · You vomit blood or what looks like coffee grounds.    Watch closely for changes in your health, and be sure to contact your doctor if:    · You have ongoing nausea and vomiting.     · Your vomiting is getting worse.     · Your vomiting lasts longer than 2 days.     · You are not getting better as expected. Where can you learn more? Go to http://lenka-radha.info/. Enter 25 [id number] in the search box to learn more about \"Nausea and Vomiting: Care Instructions. \"  Current as of: June 26, 2019  Content Version: 12.2  © 0792-1884 [employer], [employer]. Care instructions adapted under license by [employer] (which disclaims liability or warranty for this information). If you have questions about a medical condition or this instruction, always ask your healthcare professional. [address] any warranty or liability for your use of this information.

## 2019-12-12 NOTE — ED PROVIDER NOTES
EMERGENCY DEPARTMENT HISTORY AND PHYSICAL EXAM  This was created with voice recognition software and transcription errors may be present. 7:58 PM  Date: 2019  Patient Name: Queta Prince    History of Presenting Illness     Chief Complaint:    History Provided By:     HPI: Queta Prince is a 45 y.o. female presents with 1 day of nausea diffuse abdominal pain and feels like she may be about to have diarrhea symptoms started while at work today no history of similar. No chest pain plus chills. PCP: Yue Gillis NP      Past History     Past Medical History:  Past Medical History:   Diagnosis Date    Breast cancer screening by mammogram 2019    Breast biopsy benign. May resume routine annual screenings.  Hypertension        Past Surgical History:  Past Surgical History:   Procedure Laterality Date    HX BREAST BIOPSY  2006    Right-benign    HX OOPHORECTOMY      HX OTHER SURGICAL  2013    left neck cyst removal    HX OTHER SURGICAL Right 2017    Pelvic surgery possibly ovary       Family History:  Family History   Problem Relation Age of Onset    Hypertension Mother     Diabetes Mother     Hypertension Father     Heart Disease Father     Stroke Maternal Grandmother     Lung Cancer Maternal Grandmother        Social History:  Social History     Tobacco Use    Smoking status: Former Smoker     Packs/day: 0.50     Years: 10.00     Pack years: 5.00     Last attempt to quit: 7/3/2017     Years since quittin.4    Smokeless tobacco: Never Used   Substance Use Topics    Alcohol use: Yes     Alcohol/week: 6.0 standard drinks     Types: 6 Glasses of wine per week     Frequency: 2-4 times a month     Drinks per session: 5 or 6     Binge frequency: Monthly    Drug use: Yes     Types: Marijuana       Allergies:  No Known Allergies    Review of Systems     Review of Systems   Respiratory: Negative for chest tightness.     Gastrointestinal: Positive for abdominal distention. All other systems reviewed and are negative. 10 point review of systems otherwise negative unless noted in HPI. Physical Exam       Physical Exam  Constitutional:       Appearance: She is well-developed. HENT:      Head: Normocephalic and atraumatic. Eyes:      Pupils: Pupils are equal, round, and reactive to light. Neck:      Musculoskeletal: Normal range of motion and neck supple. Cardiovascular:      Rate and Rhythm: Normal rate and regular rhythm. Heart sounds: Normal heart sounds. No murmur. No friction rub. Pulmonary:      Effort: Pulmonary effort is normal. No respiratory distress. Breath sounds: Normal breath sounds. No wheezing. Abdominal:      General: There is no distension. Palpations: Abdomen is soft. Tenderness: There is no tenderness. There is no guarding or rebound. Musculoskeletal: Normal range of motion. Skin:     General: Skin is warm and dry. Neurological:      Mental Status: She is alert and oriented to person, place, and time. Psychiatric:         Behavior: Behavior normal.         Thought Content: Thought content normal.         Diagnostic Study Results     Vital Signs  EKG: EKG shows sinus at 75 normal axis normal intervals there is no ST elevation or depression and no hypertrophy  Labs: CBC is unremarkable chemistries normal mild hypokalemia  Imaging:     Medical Decision Making     ED Course: Progress Notes, Reevaluation, and Consults:      Provider Notes (Medical Decision Making): 62-year-old female presents with abdominal pain vomiting chills that she is about to have diarrhea. Sounds viral.  Will check basic labs    She is still vomiting has some chills will attain CT abdomen pelvis    Turn over to Dr. Lizett Brito pending dispo. Patient CT is negative stops vomiting likely okay for discharge            Diagnosis     Clinical Impression: No diagnosis found.     Disposition:    Patient's Medications   Start Taking    No medications on file   Continue Taking    HYDROCHLOROTHIAZIDE (MICROZIDE) 12.5 MG CAPSULE    Take 1 Cap by mouth daily. ONDANSETRON (ZOFRAN ODT) 4 MG DISINTEGRATING TABLET    Take 1-2 tablets every 6-8 hours as needed for nausea and vomiting. PITAVASTATIN CALCIUM (LIVALO) 4 MG TAB TABLET    Take 1 Tab by mouth daily. PITAVASTATIN CALCIUM (LIVALO) 4 MG TAB TABLET    Take 1 Tab by mouth daily. TRANEXAMIC ACID (LYSTEDA) 650 MG TAB TABLET    Take 1,300 mg by mouth three (3) times daily.  Indications: during menses for 5 days   These Medications have changed    No medications on file   Stop Taking    No medications on file

## 2019-12-12 NOTE — ED NOTES
12/11/2019 2100 -took signout from Dr. Terrance Sesay. Patient pending CT scan. 0 -patient has not vomited in 7 hours. The patient is p.o. challenged. Patient still nauseated but tolerating p.o. We will treat empirically. Will discharge home with antiemetics. Patient understands and agrees with this plan. Patient will follow-up with PCP.

## 2019-12-16 ENCOUNTER — APPOINTMENT (OUTPATIENT)
Dept: CT IMAGING | Age: 44
End: 2019-12-16
Attending: EMERGENCY MEDICINE
Payer: SELF-PAY

## 2019-12-16 ENCOUNTER — HOSPITAL ENCOUNTER (EMERGENCY)
Age: 44
Discharge: HOME | End: 2019-12-17
Attending: EMERGENCY MEDICINE
Payer: SELF-PAY

## 2019-12-16 VITALS
HEART RATE: 74 BPM | OXYGEN SATURATION: 100 % | SYSTOLIC BLOOD PRESSURE: 110 MMHG | WEIGHT: 180 LBS | DIASTOLIC BLOOD PRESSURE: 88 MMHG | BODY MASS INDEX: 29.95 KG/M2 | RESPIRATION RATE: 18 BRPM | TEMPERATURE: 98.8 F

## 2019-12-16 DIAGNOSIS — R74.8 ELEVATED LIPASE: ICD-10-CM

## 2019-12-16 DIAGNOSIS — R10.13 ABDOMINAL PAIN, EPIGASTRIC: ICD-10-CM

## 2019-12-16 DIAGNOSIS — R11.2 NON-INTRACTABLE VOMITING WITH NAUSEA, UNSPECIFIED VOMITING TYPE: ICD-10-CM

## 2019-12-16 DIAGNOSIS — E86.0 DEHYDRATION: Primary | ICD-10-CM

## 2019-12-16 DIAGNOSIS — E87.6 HYPOKALEMIA: ICD-10-CM

## 2019-12-16 LAB
ALBUMIN SERPL-MCNC: 3.8 G/DL (ref 3.4–5)
ALBUMIN/GLOB SERPL: 0.8 {RATIO} (ref 0.8–1.7)
ALP SERPL-CCNC: 66 U/L (ref 45–117)
ALT SERPL-CCNC: 31 U/L (ref 13–56)
AMPHET UR QL SCN: NEGATIVE
ANION GAP SERPL CALC-SCNC: 8 MMOL/L (ref 3–18)
APPEARANCE UR: CLEAR
AST SERPL-CCNC: 20 U/L (ref 10–38)
BACTERIA URNS QL MICRO: ABNORMAL /HPF
BARBITURATES UR QL SCN: NEGATIVE
BASOPHILS # BLD: 0 K/UL (ref 0–0.1)
BASOPHILS NFR BLD: 0 % (ref 0–2)
BENZODIAZ UR QL: NEGATIVE
BILIRUB DIRECT SERPL-MCNC: 0.3 MG/DL (ref 0–0.2)
BILIRUB SERPL-MCNC: 1 MG/DL (ref 0.2–1)
BILIRUB UR QL: NEGATIVE
BUN SERPL-MCNC: 22 MG/DL (ref 7–18)
BUN/CREAT SERPL: 18 (ref 12–20)
CALCIUM SERPL-MCNC: 8.8 MG/DL (ref 8.5–10.1)
CANNABINOIDS UR QL SCN: POSITIVE
CHLORIDE SERPL-SCNC: 102 MMOL/L (ref 100–111)
CO2 SERPL-SCNC: 28 MMOL/L (ref 21–32)
COCAINE UR QL SCN: NEGATIVE
COLOR UR: YELLOW
CREAT SERPL-MCNC: 1.19 MG/DL (ref 0.6–1.3)
DIFFERENTIAL METHOD BLD: ABNORMAL
EOSINOPHIL # BLD: 0 K/UL (ref 0–0.4)
EOSINOPHIL NFR BLD: 0 % (ref 0–5)
EPITH CASTS URNS QL MICRO: ABNORMAL /LPF (ref 0–5)
ERYTHROCYTE [DISTWIDTH] IN BLOOD BY AUTOMATED COUNT: 13.7 % (ref 11.6–14.5)
GLOBULIN SER CALC-MCNC: 4.7 G/DL (ref 2–4)
GLUCOSE SERPL-MCNC: 110 MG/DL (ref 74–99)
GLUCOSE UR STRIP.AUTO-MCNC: NEGATIVE MG/DL
HCG SERPL QL: NEGATIVE
HCT VFR BLD AUTO: 41.4 % (ref 35–45)
HDSCOM,HDSCOM: ABNORMAL
HGB BLD-MCNC: 14.4 G/DL (ref 12–16)
HGB UR QL STRIP: ABNORMAL
KETONES UR QL STRIP.AUTO: ABNORMAL MG/DL
LEUKOCYTE ESTERASE UR QL STRIP.AUTO: ABNORMAL
LIPASE SERPL-CCNC: 690 U/L (ref 73–393)
LYMPHOCYTES # BLD: 2.8 K/UL (ref 0.9–3.6)
LYMPHOCYTES NFR BLD: 19 % (ref 21–52)
MAGNESIUM SERPL-MCNC: 2.5 MG/DL (ref 1.6–2.6)
MCH RBC QN AUTO: 29.8 PG (ref 24–34)
MCHC RBC AUTO-ENTMCNC: 34.8 G/DL (ref 31–37)
MCV RBC AUTO: 85.7 FL (ref 74–97)
METHADONE UR QL: NEGATIVE
MONOCYTES # BLD: 1.2 K/UL (ref 0.05–1.2)
MONOCYTES NFR BLD: 8 % (ref 3–10)
NEUTS SEG # BLD: 10.5 K/UL (ref 1.8–8)
NEUTS SEG NFR BLD: 73 % (ref 40–73)
NITRITE UR QL STRIP.AUTO: NEGATIVE
OPIATES UR QL: POSITIVE
PCP UR QL: NEGATIVE
PH UR STRIP: 6.5 [PH] (ref 5–8)
PLATELET # BLD AUTO: 306 K/UL (ref 135–420)
PMV BLD AUTO: 9.8 FL (ref 9.2–11.8)
POTASSIUM SERPL-SCNC: 2.4 MMOL/L (ref 3.5–5.5)
PROT SERPL-MCNC: 8.5 G/DL (ref 6.4–8.2)
PROT UR STRIP-MCNC: ABNORMAL MG/DL
RBC # BLD AUTO: 4.83 M/UL (ref 4.2–5.3)
RBC #/AREA URNS HPF: ABNORMAL /HPF (ref 0–5)
SODIUM SERPL-SCNC: 138 MMOL/L (ref 136–145)
SP GR UR REFRACTOMETRY: >1.03 (ref 1–1.03)
UROBILINOGEN UR QL STRIP.AUTO: 1 EU/DL (ref 0.2–1)
WBC # BLD AUTO: 14.5 K/UL (ref 4.6–13.2)
WBC URNS QL MICRO: ABNORMAL /HPF (ref 0–4)

## 2019-12-16 PROCEDURE — 74177 CT ABD & PELVIS W/CONTRAST: CPT

## 2019-12-16 PROCEDURE — 74011000250 HC RX REV CODE- 250: Performed by: EMERGENCY MEDICINE

## 2019-12-16 PROCEDURE — 74011000258 HC RX REV CODE- 258: Performed by: EMERGENCY MEDICINE

## 2019-12-16 PROCEDURE — 85025 COMPLETE CBC W/AUTO DIFF WBC: CPT

## 2019-12-16 PROCEDURE — 96376 TX/PRO/DX INJ SAME DRUG ADON: CPT

## 2019-12-16 PROCEDURE — 80048 BASIC METABOLIC PNL TOTAL CA: CPT

## 2019-12-16 PROCEDURE — 74011250637 HC RX REV CODE- 250/637: Performed by: EMERGENCY MEDICINE

## 2019-12-16 PROCEDURE — 83735 ASSAY OF MAGNESIUM: CPT

## 2019-12-16 PROCEDURE — 96365 THER/PROPH/DIAG IV INF INIT: CPT

## 2019-12-16 PROCEDURE — 74011250636 HC RX REV CODE- 250/636: Performed by: EMERGENCY MEDICINE

## 2019-12-16 PROCEDURE — 99285 EMERGENCY DEPT VISIT HI MDM: CPT

## 2019-12-16 PROCEDURE — 96374 THER/PROPH/DIAG INJ IV PUSH: CPT

## 2019-12-16 PROCEDURE — 80076 HEPATIC FUNCTION PANEL: CPT

## 2019-12-16 PROCEDURE — 83690 ASSAY OF LIPASE: CPT

## 2019-12-16 PROCEDURE — 96375 TX/PRO/DX INJ NEW DRUG ADDON: CPT

## 2019-12-16 PROCEDURE — 80307 DRUG TEST PRSMV CHEM ANLYZR: CPT

## 2019-12-16 PROCEDURE — 74011636320 HC RX REV CODE- 636/320: Performed by: EMERGENCY MEDICINE

## 2019-12-16 PROCEDURE — C9113 INJ PANTOPRAZOLE SODIUM, VIA: HCPCS | Performed by: EMERGENCY MEDICINE

## 2019-12-16 PROCEDURE — 84703 CHORIONIC GONADOTROPIN ASSAY: CPT

## 2019-12-16 PROCEDURE — 81001 URINALYSIS AUTO W/SCOPE: CPT

## 2019-12-16 PROCEDURE — 96366 THER/PROPH/DIAG IV INF ADDON: CPT

## 2019-12-16 RX ORDER — METOCLOPRAMIDE HYDROCHLORIDE 5 MG/ML
10 INJECTION INTRAMUSCULAR; INTRAVENOUS
Status: COMPLETED | OUTPATIENT
Start: 2019-12-16 | End: 2019-12-16

## 2019-12-16 RX ORDER — ONDANSETRON 4 MG/1
8 TABLET, FILM COATED ORAL
Qty: 12 TAB | Refills: 0 | Status: SHIPPED | OUTPATIENT
Start: 2019-12-16 | End: 2021-10-30

## 2019-12-16 RX ORDER — PANTOPRAZOLE SODIUM 40 MG/1
40 TABLET, DELAYED RELEASE ORAL DAILY
Qty: 20 TAB | Refills: 0 | Status: SHIPPED | OUTPATIENT
Start: 2019-12-16 | End: 2020-01-05

## 2019-12-16 RX ORDER — SODIUM CHLORIDE 9 MG/ML
100 INJECTION, SOLUTION INTRAVENOUS CONTINUOUS
Status: DISCONTINUED | OUTPATIENT
Start: 2019-12-16 | End: 2019-12-17 | Stop reason: HOSPADM

## 2019-12-16 RX ORDER — HALOPERIDOL 5 MG/ML
5 INJECTION INTRAMUSCULAR
Status: COMPLETED | OUTPATIENT
Start: 2019-12-16 | End: 2019-12-16

## 2019-12-16 RX ORDER — POTASSIUM CHLORIDE 20 MEQ/1
20 TABLET, EXTENDED RELEASE ORAL 3 TIMES DAILY
Qty: 9 TAB | Refills: 0 | Status: SHIPPED | OUTPATIENT
Start: 2019-12-16 | End: 2019-12-19

## 2019-12-16 RX ORDER — MORPHINE SULFATE 4 MG/ML
4 INJECTION INTRAVENOUS
Status: COMPLETED | OUTPATIENT
Start: 2019-12-16 | End: 2019-12-16

## 2019-12-16 RX ORDER — PANTOPRAZOLE SODIUM 40 MG/10ML
40 INJECTION, POWDER, LYOPHILIZED, FOR SOLUTION INTRAVENOUS
Status: COMPLETED | OUTPATIENT
Start: 2019-12-16 | End: 2019-12-16

## 2019-12-16 RX ORDER — ONDANSETRON 2 MG/ML
4 INJECTION INTRAMUSCULAR; INTRAVENOUS
Status: COMPLETED | OUTPATIENT
Start: 2019-12-16 | End: 2019-12-16

## 2019-12-16 RX ORDER — DICYCLOMINE HYDROCHLORIDE 10 MG/1
10 CAPSULE ORAL 4 TIMES DAILY
Qty: 20 CAP | Refills: 0 | Status: SHIPPED | OUTPATIENT
Start: 2019-12-16 | End: 2019-12-21

## 2019-12-16 RX ORDER — DICYCLOMINE HYDROCHLORIDE 10 MG/1
10 CAPSULE ORAL
Status: COMPLETED | OUTPATIENT
Start: 2019-12-16 | End: 2019-12-16

## 2019-12-16 RX ADMIN — SODIUM CHLORIDE 1000 ML: 900 INJECTION, SOLUTION INTRAVENOUS at 10:14

## 2019-12-16 RX ADMIN — METOCLOPRAMIDE 10 MG: 5 INJECTION, SOLUTION INTRAMUSCULAR; INTRAVENOUS at 14:16

## 2019-12-16 RX ADMIN — LIDOCAINE HYDROCHLORIDE: 10 INJECTION, SOLUTION EPIDURAL; INFILTRATION; INTRACAUDAL; PERINEURAL at 14:15

## 2019-12-16 RX ADMIN — ONDANSETRON 4 MG: 2 INJECTION INTRAMUSCULAR; INTRAVENOUS at 10:15

## 2019-12-16 RX ADMIN — SODIUM CHLORIDE 100 ML/HR: 900 INJECTION, SOLUTION INTRAVENOUS at 11:21

## 2019-12-16 RX ADMIN — PANTOPRAZOLE SODIUM 40 MG: 40 INJECTION, POWDER, FOR SOLUTION INTRAVENOUS at 10:17

## 2019-12-16 RX ADMIN — POTASSIUM BICARBONATE 20 MEQ: 782 TABLET, EFFERVESCENT ORAL at 11:21

## 2019-12-16 RX ADMIN — DICYCLOMINE HYDROCHLORIDE 10 MG: 10 CAPSULE ORAL at 14:19

## 2019-12-16 RX ADMIN — HALOPERIDOL LACTATE 5 MG: 5 INJECTION INTRAMUSCULAR at 10:48

## 2019-12-16 RX ADMIN — MORPHINE SULFATE 4 MG: 4 INJECTION INTRAVENOUS at 10:50

## 2019-12-16 RX ADMIN — IOPAMIDOL 100 ML: 612 INJECTION, SOLUTION INTRAVENOUS at 12:28

## 2019-12-16 RX ADMIN — LIDOCAINE HYDROCHLORIDE: 10 INJECTION, SOLUTION EPIDURAL; INFILTRATION; INTRACAUDAL; PERINEURAL at 15:15

## 2019-12-16 NOTE — ED TRIAGE NOTES
\"I've been vomiting, nausea, and body aches for the past 3 days.  I can't even walk and the pain is so bad in my stomach\"

## 2019-12-16 NOTE — ED NOTES
Discharge instructions given and patient given the opportunity to ask questions. Patient verbalized understanding. Armband removed and shredded. Patient waiting for ride home.

## 2019-12-16 NOTE — DISCHARGE INSTRUCTIONS

## 2019-12-16 NOTE — LETTER
NOTIFICATION RETURN TO WORK / SCHOOL 
 
12/16/2019 5:57 PM 
 
Ms. Eloisa Liao [id number] [address] [place] [phone] To Whom It May Concern: 
 
Eloisa Liao is currently under the care of [facility]. She will return to work/school on: 12/18/19. If there are questions or concerns please have the patient contact our office.  
 
 
 
Sincerely, 
 
 
Stephania Mijares PA-C

## 2019-12-16 NOTE — ED PROVIDER NOTES
EMERGENCY DEPARTMENT HISTORY AND PHYSICAL EXAM    Date: 12/16/2019  Patient Name: Areli Magdaleno    History of Presenting Illness     Chief Complaint   Patient presents with    Abdominal Pain    Nausea    Generalized Body Aches    Vomiting         History Provided By: Patient    Additional History (Context): Areli Magdaleno is a 45 y.o. female with hypertension and Alcohol and marijuana use who presents with upper abdominal pain nausea vomiting since Friday. Denies paul hematemesis. Denies fever. She denies dysuria or flank pain. She says she feels diarrheal stools may start. No one else at home is sick. Denies prior abdominal surgeries. Denies being seen by a car gastroenterologist previously. Last menstrual cycle was 29 November. PCP: Carolynn Kuo NP    Current Facility-Administered Medications   Medication Dose Route Frequency Provider Last Rate Last Dose    0.9% sodium chloride infusion  100 mL/hr IntraVENous CONTINUOUS Yina Gant  mL/hr at 12/16/19 1121 100 mL/hr at 12/16/19 1121    potassium chloride 10 mEq, lidocaine (PF) (XYLOCAINE) 10 mg/mL (1 %) 1 mL in 0.9% sodium chloride 100 mL IVPB   IntraVENous Q1H Yina Gant PA         Current Outpatient Medications   Medication Sig Dispense Refill    ondansetron hcl (ZOFRAN) 4 mg tablet Take 2 Tabs by mouth every eight (8) hours as needed for Nausea. 12 Tab 0    dicyclomine (BENTYL) 10 mg capsule Take 1 Cap by mouth four (4) times daily for 5 days. 20 Cap 0    pantoprazole (PROTONIX) 40 mg tablet Take 1 Tab by mouth daily for 20 days. 20 Tab 0    potassium chloride (K-DUR, KLOR-CON) 20 mEq tablet Take 1 Tab by mouth three (3) times daily for 3 days. 9 Tab 0    ondansetron (ZOFRAN ODT) 4 mg disintegrating tablet Take 1-2 tablets every 6-8 hours as needed for nausea and vomiting. 10 Tab 0    promethazine (PHENERGAN) 50 mg suppository Insert 1 Suppository into rectum every six (6) hours as needed for Nausea.  12 Suppository 0    pitavastatin calcium (LIVALO) 4 mg tab tablet Take 1 Tab by mouth daily. 90 Tab 3    pitavastatin calcium (LIVALO) 4 mg tab tablet Take 1 Tab by mouth daily. 90 Tab 0    tranexamic acid (LYSTEDA) 650 mg tab tablet Take 1,300 mg by mouth three (3) times daily. Indications: during menses for 5 days      hydroCHLOROthiazide (MICROZIDE) 12.5 mg capsule Take 1 Cap by mouth daily. 30 Cap 3       Past History     Past Medical History:  Past Medical History:   Diagnosis Date    Breast cancer screening by mammogram 2019    Breast biopsy benign. May resume routine annual screenings.  Hypertension        Past Surgical History:  Past Surgical History:   Procedure Laterality Date    HX BREAST BIOPSY  2006    Right-benign    HX OOPHORECTOMY      HX OTHER SURGICAL  2013    left neck cyst removal    HX OTHER SURGICAL Right 2017    Pelvic surgery possibly ovary       Family History:  Family History   Problem Relation Age of Onset    Hypertension Mother     Diabetes Mother     Hypertension Father     Heart Disease Father     Stroke Maternal Grandmother     Lung Cancer Maternal Grandmother        Social History:  Social History     Tobacco Use    Smoking status: Former Smoker     Packs/day: 0.50     Years: 10.00     Pack years: 5.00     Last attempt to quit: 7/3/2017     Years since quittin.4    Smokeless tobacco: Never Used   Substance Use Topics    Alcohol use: Yes     Alcohol/week: 6.0 standard drinks     Types: 6 Glasses of wine per week     Frequency: 2-4 times a month     Drinks per session: 5 or 6     Binge frequency: Monthly    Drug use: Yes     Types: Marijuana       Allergies:  No Known Allergies      Review of Systems   Review of Systems   Constitutional: Negative. Negative for fever. HENT: Negative. Eyes: Negative. Respiratory: Negative. Cardiovascular: Negative. Gastrointestinal: Positive for abdominal pain, nausea and vomiting.  Negative for blood in stool.   Endocrine: Negative. Genitourinary: Negative. Musculoskeletal: Negative. Skin: Negative. Allergic/Immunologic: Negative. Neurological: Negative. Hematological: Negative. Psychiatric/Behavioral: Negative. All Other Systems Negative  Physical Exam     Vitals:    12/16/19 1200 12/16/19 1345 12/16/19 1400 12/16/19 1430   BP: 135/82 115/58 122/84 93/57   Pulse: (!) 101 99 (!) 117 76   Resp: 20 18 29 16   Temp:       SpO2: 99% (!) 51%  100%   Weight:         Physical Exam  Vitals signs and nursing note reviewed. Constitutional:       Appearance: She is well-developed. HENT:      Head: Normocephalic and atraumatic. Right Ear: External ear normal.      Left Ear: External ear normal.      Nose: Nose normal.   Eyes:      Conjunctiva/sclera: Conjunctivae normal.      Pupils: Pupils are equal, round, and reactive to light. Neck:      Musculoskeletal: Normal range of motion and neck supple. Vascular: No JVD. Trachea: No tracheal deviation. Cardiovascular:      Rate and Rhythm: Normal rate and regular rhythm. Heart sounds: Normal heart sounds. No murmur. No friction rub. No gallop. Pulmonary:      Effort: Pulmonary effort is normal. No respiratory distress. Breath sounds: Normal breath sounds. No wheezing or rales. Abdominal:      General: Bowel sounds are normal. There is no distension. Palpations: Abdomen is soft. There is no mass. Tenderness: There is tenderness in the epigastric area. There is no guarding or rebound. Musculoskeletal: Normal range of motion. General: No tenderness. Skin:     General: Skin is warm and dry. Findings: No rash. Neurological:      Mental Status: She is alert and oriented to person, place, and time. Cranial Nerves: No cranial nerve deficit. Deep Tendon Reflexes: Reflexes are normal and symmetric.    Psychiatric:         Behavior: Behavior normal.            Diagnostic Study Results     Labs -     Recent Results (from the past 12 hour(s))   CBC WITH AUTOMATED DIFF    Collection Time: 12/16/19  9:38 AM   Result Value Ref Range    WBC 14.5 (H) 4.6 - 13.2 K/uL    RBC 4.83 4.20 - 5.30 M/uL    HGB 14.4 12.0 - 16.0 g/dL    HCT 41.4 35.0 - 45.0 %    MCV 85.7 74.0 - 97.0 FL    MCH 29.8 24.0 - 34.0 PG    MCHC 34.8 31.0 - 37.0 g/dL    RDW 13.7 11.6 - 14.5 %    PLATELET 602 029 - 022 K/uL    MPV 9.8 9.2 - 11.8 FL    NEUTROPHILS 73 40 - 73 %    LYMPHOCYTES 19 (L) 21 - 52 %    MONOCYTES 8 3 - 10 %    EOSINOPHILS 0 0 - 5 %    BASOPHILS 0 0 - 2 %    ABS. NEUTROPHILS 10.5 (H) 1.8 - 8.0 K/UL    ABS. LYMPHOCYTES 2.8 0.9 - 3.6 K/UL    ABS. MONOCYTES 1.2 0.05 - 1.2 K/UL    ABS. EOSINOPHILS 0.0 0.0 - 0.4 K/UL    ABS. BASOPHILS 0.0 0.0 - 0.1 K/UL    DF AUTOMATED     HCG QL SERUM    Collection Time: 12/16/19  9:38 AM   Result Value Ref Range    HCG, Ql. NEGATIVE  NEG     METABOLIC PANEL, BASIC    Collection Time: 12/16/19 10:15 AM   Result Value Ref Range    Sodium 138 136 - 145 mmol/L    Potassium 2.4 (LL) 3.5 - 5.5 mmol/L    Chloride 102 100 - 111 mmol/L    CO2 28 21 - 32 mmol/L    Anion gap 8 3.0 - 18 mmol/L    Glucose 110 (H) 74 - 99 mg/dL    BUN 22 (H) 7.0 - 18 MG/DL    Creatinine 1.19 0.6 - 1.3 MG/DL    BUN/Creatinine ratio 18 12 - 20      GFR est AA >60 >60 ml/min/1.73m2    GFR est non-AA 50 (L) >60 ml/min/1.73m2    Calcium 8.8 8.5 - 10.1 MG/DL   LIPASE    Collection Time: 12/16/19 10:15 AM   Result Value Ref Range    Lipase 690 (H) 73 - 393 U/L   HEPATIC FUNCTION PANEL    Collection Time: 12/16/19 10:15 AM   Result Value Ref Range    Protein, total 8.5 (H) 6.4 - 8.2 g/dL    Albumin 3.8 3.4 - 5.0 g/dL    Globulin 4.7 (H) 2.0 - 4.0 g/dL    A-G Ratio 0.8 0.8 - 1.7      Bilirubin, total 1.0 0.2 - 1.0 MG/DL    Bilirubin, direct 0.3 (H) 0.0 - 0.2 MG/DL    Alk.  phosphatase 66 45 - 117 U/L    AST (SGOT) 20 10 - 38 U/L    ALT (SGPT) 31 13 - 56 U/L   MAGNESIUM    Collection Time: 12/16/19 10:15 AM   Result Value Ref Range Magnesium 2.5 1.6 - 2.6 mg/dL   URINALYSIS W/ RFLX MICROSCOPIC    Collection Time: 12/16/19  1:30 PM   Result Value Ref Range    Color YELLOW      Appearance CLEAR      Specific gravity >1.030 (H) 1.005 - 1.030    pH (UA) 6.5 5.0 - 8.0      Protein TRACE (A) NEG mg/dL    Glucose NEGATIVE  NEG mg/dL    Ketone TRACE (A) NEG mg/dL    Bilirubin NEGATIVE  NEG      Blood MODERATE (A) NEG      Urobilinogen 1.0 0.2 - 1.0 EU/dL    Nitrites NEGATIVE  NEG      Leukocyte Esterase TRACE (A) NEG     DRUG SCREEN, URINE    Collection Time: 12/16/19  1:30 PM   Result Value Ref Range    BENZODIAZEPINES NEGATIVE  NEG      BARBITURATES NEGATIVE  NEG      THC (TH-CANNABINOL) POSITIVE (A) NEG      OPIATES POSITIVE (A) NEG      PCP(PHENCYCLIDINE) NEGATIVE  NEG      COCAINE NEGATIVE  NEG      AMPHETAMINES NEGATIVE  NEG      METHADONE NEGATIVE  NEG      HDSCOM (NOTE)    URINE MICROSCOPIC ONLY    Collection Time: 12/16/19  1:30 PM   Result Value Ref Range    WBC 3 to 5 0 - 4 /hpf    RBC 4 to 6 0 - 5 /hpf    Epithelial cells 2+ 0 - 5 /lpf    Bacteria 1+ (A) NEG /hpf       Radiologic Studies -   CT ABD PELV W CONT   Final Result   IMPRESSION:       Limited evaluation for bowel pathology due to lack of oral contrast.      Question cystic change left ovary. Ultrasound is performed for further   clarification. Please see report for additional findings and details. .        CT Results  (Last 48 hours)               12/16/19 1227  CT ABD PELV W CONT Final result    Impression:  IMPRESSION:        Limited evaluation for bowel pathology due to lack of oral contrast.       Question cystic change left ovary. Ultrasound is performed for further   clarification. Please see report for additional findings and details. .       Narrative:  EXAM: CT ABDOMEN PELVIS WITH CONTRAST       INDICATION: Abdominal pain       COMPARISON: December 11, 2019. CONTRAST: 100 mL of Isovue-300.        TECHNIQUE:     Multislice helical CT was performed from the diaphragm to the symphysis pubis   during uneventful rapid bolus intravenous contrast administration. Oral contrast   not administered. Contiguous 5 mm axial images were reconstructed and lung and   soft tissue windows were generated. Coronal and sagittal reformations were   generated. CT dose reduction was achieved through use of a standardized protocol   tailored for this examination and automatic exposure control for dose   modulation. FINDINGS:   The visualized portions of the lung bases are clear. There are no focal abnormalities within the liver, spleen, pancreas, adrenal   glands or kidneys. The aorta tapers without aneurysm. There is no retroperitoneal adenopathy or   mass. Atherosclerosis. The bowel is normal. The appendix is incompletely evaluated. . There is no   ascites or free intraperitoneal air. Uterus grossly unremarkable. Right ovary not clearly delineated. Question left   ovarian cystic change. Right gluteal subcutaneous calcified granuloma. Rotoscoliosis and osseous   degenerative changes. CXR Results  (Last 48 hours)    None            Medical Decision Making   I am the first provider for this patient. I reviewed the vital signs, available nursing notes, past medical history, past surgical history, family history and social history. Vital Signs-Reviewed the patient's vital signs. Records Reviewed: Nursing Notes and Old Medical Records    Procedures:  Procedures    Provider Notes (Medical Decision Making):   Labs and CT scan showed nothing acute other than an elevated lipase. Patient is requesting p.o.ice and has tolerated this. Will continue IV infusion of potassium before discharged. Advised clear liquid diet for follow-up.   MED RECONCILIATION:  Current Facility-Administered Medications   Medication Dose Route Frequency    0.9% sodium chloride infusion  100 mL/hr IntraVENous CONTINUOUS    potassium chloride 10 mEq, lidocaine (PF) (XYLOCAINE) 10 mg/mL (1 %) 1 mL in 0.9% sodium chloride 100 mL IVPB   IntraVENous Q1H     Current Outpatient Medications   Medication Sig    ondansetron hcl (ZOFRAN) 4 mg tablet Take 2 Tabs by mouth every eight (8) hours as needed for Nausea.  dicyclomine (BENTYL) 10 mg capsule Take 1 Cap by mouth four (4) times daily for 5 days.  pantoprazole (PROTONIX) 40 mg tablet Take 1 Tab by mouth daily for 20 days.  potassium chloride (K-DUR, KLOR-CON) 20 mEq tablet Take 1 Tab by mouth three (3) times daily for 3 days.  ondansetron (ZOFRAN ODT) 4 mg disintegrating tablet Take 1-2 tablets every 6-8 hours as needed for nausea and vomiting.  promethazine (PHENERGAN) 50 mg suppository Insert 1 Suppository into rectum every six (6) hours as needed for Nausea.  pitavastatin calcium (LIVALO) 4 mg tab tablet Take 1 Tab by mouth daily.  pitavastatin calcium (LIVALO) 4 mg tab tablet Take 1 Tab by mouth daily.  tranexamic acid (LYSTEDA) 650 mg tab tablet Take 1,300 mg by mouth three (3) times daily. Indications: during menses for 5 days    hydroCHLOROthiazide (MICROZIDE) 12.5 mg capsule Take 1 Cap by mouth daily. Disposition:  home    DISCHARGE NOTE:   2:37 PM    Pt has been reexamined. Patient has no new complaints, changes, or physical findings. Care plan outlined and precautions discussed. Results of labs, CT were reviewed with the patient. All medications were reviewed with the patient; will d/c home with zofran, protonix, bentyl. All of pt's questions and concerns were addressed. Patient was instructed and agrees to follow up with PCP, as well as to return to the ED upon further deterioration. Patient is ready to go home.     Follow-up Information     Follow up With Specialties Details Why Contact Info    Pascual Hartman NP Nurse Practitioner Schedule an appointment as soon as possible for a visit in 1 day  [facility] [facility] [address]      [facility] EMERGENCY DEPT Emergency Medicine  If symptoms worsen return immediately [phone] [id number]  [phone]          Current Discharge Medication List      START taking these medications    Details   ondansetron hcl (ZOFRAN) 4 mg tablet Take 2 Tabs by mouth every eight (8) hours as needed for Nausea. Qty: 12 Tab, Refills: 0      dicyclomine (BENTYL) 10 mg capsule Take 1 Cap by mouth four (4) times daily for 5 days. Qty: 20 Cap, Refills: 0      pantoprazole (PROTONIX) 40 mg tablet Take 1 Tab by mouth daily for 20 days. Qty: 20 Tab, Refills: 0      potassium chloride (K-DUR, KLOR-CON) 20 mEq tablet Take 1 Tab by mouth three (3) times daily for 3 days. Qty: 9 Tab, Refills: 0             Diagnosis     Clinical Impression:   1. Dehydration    2. Abdominal pain, epigastric    3. Non-intractable vomiting with nausea, unspecified vomiting type    4. Elevated lipase    5. Hypokalemia      2:42 PM  I have spent 35 minutes of critical care time involved in lab review, consultations with specialist, family decision-making, and documentation. During this entire length of time I was immediately available to the patient. Critical Care: The reason for providing this level of medical care for this critically ill patient was due a critical illness that impaired one or more vital organ systems such that there was a high probability of imminent or life threatening deterioration in the patients condition. This care involved high complexity decision making to assess, manipulate, and support vital system functions, to treat this degreee vital organ system failure and to prevent further life threatening deterioration of the patients condition.

## 2019-12-17 PROCEDURE — 96361 HYDRATE IV INFUSION ADD-ON: CPT

## 2020-08-16 ENCOUNTER — HOSPITAL ENCOUNTER (EMERGENCY)
Age: 45
Discharge: LEFT BEFORE DISCHARGE | End: 2020-08-16
Attending: EMERGENCY MEDICINE

## 2020-08-16 VITALS
DIASTOLIC BLOOD PRESSURE: 92 MMHG | SYSTOLIC BLOOD PRESSURE: 135 MMHG | HEART RATE: 72 BPM | WEIGHT: 169.4 LBS | HEIGHT: 65 IN | OXYGEN SATURATION: 98 % | TEMPERATURE: 97.5 F | RESPIRATION RATE: 16 BRPM | BODY MASS INDEX: 28.22 KG/M2

## 2020-08-16 PROCEDURE — 75810000275 HC EMERGENCY DEPT VISIT NO LEVEL OF CARE

## 2021-10-29 ENCOUNTER — HOSPITAL ENCOUNTER (OUTPATIENT)
Age: 46
Setting detail: OBSERVATION
Discharge: HOME | End: 2021-10-30
Attending: STUDENT IN AN ORGANIZED HEALTH CARE EDUCATION/TRAINING PROGRAM | Admitting: STUDENT IN AN ORGANIZED HEALTH CARE EDUCATION/TRAINING PROGRAM

## 2021-10-29 ENCOUNTER — APPOINTMENT (OUTPATIENT)
Dept: GENERAL RADIOLOGY | Age: 46
End: 2021-10-29
Attending: PHYSICIAN ASSISTANT

## 2021-10-29 ENCOUNTER — APPOINTMENT (OUTPATIENT)
Dept: MRI IMAGING | Age: 46
End: 2021-10-29
Attending: STUDENT IN AN ORGANIZED HEALTH CARE EDUCATION/TRAINING PROGRAM

## 2021-10-29 ENCOUNTER — APPOINTMENT (OUTPATIENT)
Dept: CT IMAGING | Age: 46
End: 2021-10-29
Attending: PHYSICIAN ASSISTANT

## 2021-10-29 DIAGNOSIS — G45.9 TIA (TRANSIENT ISCHEMIC ATTACK): Primary | ICD-10-CM

## 2021-10-29 PROBLEM — I10 HTN (HYPERTENSION): Status: ACTIVE | Noted: 2021-10-29

## 2021-10-29 LAB
ALBUMIN SERPL-MCNC: 3.4 G/DL (ref 3.4–5)
ALBUMIN/GLOB SERPL: 0.7 {RATIO} (ref 0.8–1.7)
ALP SERPL-CCNC: 65 U/L (ref 45–117)
ALT SERPL-CCNC: 39 U/L (ref 13–56)
ANION GAP SERPL CALC-SCNC: 6 MMOL/L (ref 3–18)
AST SERPL-CCNC: 27 U/L (ref 10–38)
BASOPHILS # BLD: 0.1 K/UL (ref 0–0.1)
BASOPHILS NFR BLD: 1 % (ref 0–2)
BILIRUB SERPL-MCNC: 0.4 MG/DL (ref 0.2–1)
BUN SERPL-MCNC: 10 MG/DL (ref 7–18)
BUN/CREAT SERPL: 14 (ref 12–20)
CALCIUM SERPL-MCNC: 8.4 MG/DL (ref 8.5–10.1)
CHLORIDE SERPL-SCNC: 105 MMOL/L (ref 100–111)
CK MB CFR SERPL CALC: NORMAL % (ref 0–4)
CK MB SERPL-MCNC: <1 NG/ML (ref 5–25)
CK SERPL-CCNC: 176 U/L (ref 26–192)
CO2 SERPL-SCNC: 26 MMOL/L (ref 21–32)
CREAT SERPL-MCNC: 0.7 MG/DL (ref 0.6–1.3)
DIFFERENTIAL METHOD BLD: ABNORMAL
EOSINOPHIL # BLD: 0.1 K/UL (ref 0–0.4)
EOSINOPHIL NFR BLD: 1 % (ref 0–5)
ERYTHROCYTE [DISTWIDTH] IN BLOOD BY AUTOMATED COUNT: 15 % (ref 11.6–14.5)
GLOBULIN SER CALC-MCNC: 4.7 G/DL (ref 2–4)
GLUCOSE SERPL-MCNC: 87 MG/DL (ref 74–99)
HCG SERPL QL: NEGATIVE
HCT VFR BLD AUTO: 33.9 % (ref 35–45)
HGB BLD-MCNC: 11 G/DL (ref 12–16)
INR PPP: 1 (ref 0.8–1.2)
LYMPHOCYTES # BLD: 3.3 K/UL (ref 0.9–3.6)
LYMPHOCYTES NFR BLD: 33 % (ref 21–52)
MAGNESIUM SERPL-MCNC: 2.3 MG/DL (ref 1.6–2.6)
MCH RBC QN AUTO: 28.9 PG (ref 24–34)
MCHC RBC AUTO-ENTMCNC: 32.4 G/DL (ref 31–37)
MCV RBC AUTO: 89 FL (ref 78–100)
MONOCYTES # BLD: 0.8 K/UL (ref 0.05–1.2)
MONOCYTES NFR BLD: 8 % (ref 3–10)
NEUTS SEG # BLD: 5.7 K/UL (ref 1.8–8)
NEUTS SEG NFR BLD: 57 % (ref 40–73)
PLATELET # BLD AUTO: 247 K/UL (ref 135–420)
PMV BLD AUTO: 9.8 FL (ref 9.2–11.8)
POTASSIUM SERPL-SCNC: 3.7 MMOL/L (ref 3.5–5.5)
PROT SERPL-MCNC: 8.1 G/DL (ref 6.4–8.2)
PROTHROMBIN TIME: 13.5 SEC (ref 11.5–15.2)
RBC # BLD AUTO: 3.81 M/UL (ref 4.2–5.3)
SODIUM SERPL-SCNC: 137 MMOL/L (ref 136–145)
TROPONIN I SERPL-MCNC: <0.02 NG/ML (ref 0–0.04)
WBC # BLD AUTO: 9.9 K/UL (ref 4.6–13.2)

## 2021-10-29 PROCEDURE — 99283 EMERGENCY DEPT VISIT LOW MDM: CPT

## 2021-10-29 PROCEDURE — 99218 HC RM OBSERVATION: CPT

## 2021-10-29 PROCEDURE — 93005 ELECTROCARDIOGRAM TRACING: CPT

## 2021-10-29 PROCEDURE — 71045 X-RAY EXAM CHEST 1 VIEW: CPT

## 2021-10-29 PROCEDURE — 70544 MR ANGIOGRAPHY HEAD W/O DYE: CPT

## 2021-10-29 PROCEDURE — 84703 CHORIONIC GONADOTROPIN ASSAY: CPT

## 2021-10-29 PROCEDURE — 96375 TX/PRO/DX INJ NEW DRUG ADDON: CPT

## 2021-10-29 PROCEDURE — 96374 THER/PROPH/DIAG INJ IV PUSH: CPT

## 2021-10-29 PROCEDURE — 83036 HEMOGLOBIN GLYCOSYLATED A1C: CPT

## 2021-10-29 PROCEDURE — 70551 MRI BRAIN STEM W/O DYE: CPT

## 2021-10-29 PROCEDURE — 80053 COMPREHEN METABOLIC PANEL: CPT

## 2021-10-29 PROCEDURE — 82553 CREATINE MB FRACTION: CPT

## 2021-10-29 PROCEDURE — 74011250636 HC RX REV CODE- 250/636: Performed by: PHYSICIAN ASSISTANT

## 2021-10-29 PROCEDURE — 83735 ASSAY OF MAGNESIUM: CPT

## 2021-10-29 PROCEDURE — 99220 PR INITIAL OBSERVATION CARE/DAY 70 MINUTES: CPT | Performed by: STUDENT IN AN ORGANIZED HEALTH CARE EDUCATION/TRAINING PROGRAM

## 2021-10-29 PROCEDURE — 70450 CT HEAD/BRAIN W/O DYE: CPT

## 2021-10-29 PROCEDURE — 85025 COMPLETE CBC W/AUTO DIFF WBC: CPT

## 2021-10-29 PROCEDURE — 85610 PROTHROMBIN TIME: CPT

## 2021-10-29 RX ORDER — ASPIRIN 325 MG
325 TABLET ORAL
Status: COMPLETED | OUTPATIENT
Start: 2021-10-29 | End: 2021-10-30

## 2021-10-29 RX ORDER — ENOXAPARIN SODIUM 100 MG/ML
40 INJECTION SUBCUTANEOUS EVERY 24 HOURS
Status: DISCONTINUED | OUTPATIENT
Start: 2021-10-29 | End: 2021-10-30 | Stop reason: HOSPADM

## 2021-10-29 RX ORDER — GUAIFENESIN 100 MG/5ML
81 LIQUID (ML) ORAL DAILY
Status: DISCONTINUED | OUTPATIENT
Start: 2021-10-30 | End: 2021-10-30 | Stop reason: HOSPADM

## 2021-10-29 RX ORDER — CLOPIDOGREL BISULFATE 75 MG/1
300 TABLET ORAL
Status: COMPLETED | OUTPATIENT
Start: 2021-10-29 | End: 2021-10-30

## 2021-10-29 RX ORDER — ATORVASTATIN CALCIUM 40 MG/1
80 TABLET, FILM COATED ORAL
Status: DISCONTINUED | OUTPATIENT
Start: 2021-10-29 | End: 2021-10-30 | Stop reason: HOSPADM

## 2021-10-29 RX ORDER — ACETAMINOPHEN 325 MG/1
975 TABLET ORAL
Status: COMPLETED | OUTPATIENT
Start: 2021-10-29 | End: 2021-10-30

## 2021-10-29 RX ORDER — LABETALOL HCL 20 MG/4 ML
5 SYRINGE (ML) INTRAVENOUS
Status: DISCONTINUED | OUTPATIENT
Start: 2021-10-29 | End: 2021-10-30 | Stop reason: HOSPADM

## 2021-10-29 RX ORDER — ACETAMINOPHEN 325 MG/1
650 TABLET ORAL
Status: DISCONTINUED | OUTPATIENT
Start: 2021-10-29 | End: 2021-10-30 | Stop reason: HOSPADM

## 2021-10-29 RX ORDER — LORAZEPAM 2 MG/ML
1 INJECTION INTRAMUSCULAR
Status: COMPLETED | OUTPATIENT
Start: 2021-10-29 | End: 2021-10-29

## 2021-10-29 RX ADMIN — LORAZEPAM 1 MG: 2 INJECTION INTRAMUSCULAR; INTRAVENOUS at 21:54

## 2021-10-29 NOTE — ED PROVIDER NOTES
EMERGENCY DEPARTMENT HISTORY AND PHYSICAL EXAM    I have evaluated the patient at 6:35 PM      Date: 10/29/2021  Patient Name: Aakash Wills    History of Presenting Illness     Chief Complaint   Patient presents with    Hypertension         History Provided By: Patient  Location/Duration/Severity/Modifying factors   51-year-old female with history of hypertension presenting to the emergency department for evaluation of lightheadedness. Patient reports that around 9:00 this morning she had an event where she noticed her left-sided face drooping. At this time she was feeling lightheaded. The symptoms lasted approximately 10 minutes before resolving on their own. When she returned home at 1600 today she noticed that the facial droop had completely resolved. She presents to the emergency department now for further evaluation. She has no complaints other than feeling a bit lightheaded currently. She reports that she stopped taking her blood pressure medication a few years ago because she lost her insurance. Currently denies any headache, vision changes, chest pain, shortness of breath, abdominal pain, NVD. PCP: None    Current Facility-Administered Medications   Medication Dose Route Frequency Provider Last Rate Last Admin    aspirin tablet 325 mg  325 mg Oral NOW Elidia Sosa, DO        clopidogreL (PLAVIX) tablet 300 mg  300 mg Oral NOW Tremainea Peer, DO        acetaminophen (TYLENOL) tablet 975 mg  975 mg Oral NOW [facility]         Current Outpatient Medications   Medication Sig Dispense Refill    ondansetron hcl (ZOFRAN) 4 mg tablet Take 2 Tabs by mouth every eight (8) hours as needed for Nausea. 12 Tab 0    ondansetron (ZOFRAN ODT) 4 mg disintegrating tablet Take 1-2 tablets every 6-8 hours as needed for nausea and vomiting. 10 Tab 0    promethazine (PHENERGAN) 50 mg suppository Insert 1 Suppository into rectum every six (6) hours as needed for Nausea.  12 Suppository 0  pitavastatin calcium (LIVALO) 4 mg tab tablet Take 1 Tab by mouth daily. 90 Tab 3    pitavastatin calcium (LIVALO) 4 mg tab tablet Take 1 Tab by mouth daily. 90 Tab 0    tranexamic acid (LYSTEDA) 650 mg tab tablet Take 1,300 mg by mouth three (3) times daily. Indications: during menses for 5 days      hydroCHLOROthiazide (MICROZIDE) 12.5 mg capsule Take 1 Cap by mouth daily. 30 Cap 3       Past History     Past Medical History:  Past Medical History:   Diagnosis Date    Breast cancer screening by mammogram 2019    Breast biopsy benign. May resume routine annual screenings.  Hypertension        Past Surgical History:  Past Surgical History:   Procedure Laterality Date    HX BREAST BIOPSY  2006    Right-benign    HX OOPHORECTOMY      HX OTHER SURGICAL  2013    left neck cyst removal    HX OTHER SURGICAL Right 2017    Pelvic surgery possibly ovary       Family History:  Family History   Problem Relation Age of Onset    Hypertension Mother     Diabetes Mother     Hypertension Father     Heart Disease Father     Stroke Maternal Grandmother     Lung Cancer Maternal Grandmother        Social History:  Social History     Tobacco Use    Smoking status: Former Smoker     Packs/day: 0.50     Years: 10.00     Pack years: 5.00     Quit date: 7/3/2017     Years since quittin.3    Smokeless tobacco: Never Used   Substance Use Topics    Alcohol use: Yes     Alcohol/week: 6.0 standard drinks     Types: 6 Glasses of wine per week    Drug use: Yes     Types: Marijuana       Allergies:  No Known Allergies      Review of Systems       Review of Systems   Constitutional: Negative for activity change, chills, diaphoresis, fatigue and fever. Eyes: Negative for photophobia, pain and visual disturbance. Respiratory: Negative for cough, chest tightness, shortness of breath, wheezing and stridor. Cardiovascular: Negative for chest pain and palpitations.    Gastrointestinal: Negative for abdominal distention, abdominal pain, constipation, diarrhea, nausea and vomiting. Genitourinary: Negative for difficulty urinating, dysuria and hematuria. Musculoskeletal: Negative for back pain, joint swelling and myalgias. Skin: Negative for rash and wound. Neurological: Positive for facial asymmetry and light-headedness. Negative for dizziness, weakness and headaches. Psychiatric/Behavioral: Negative for agitation. The patient is not nervous/anxious. Physical Exam     Visit Vitals  BP (!) 177/111   Pulse 84   Temp 98.4 °F (36.9 °C)   Resp 18   Ht 5' 5\" (1.651 m)   Wt 79.8 kg (176 lb)   SpO2 98%   BMI 29.29 kg/m²         Physical Exam  Constitutional:       General: She is not in acute distress. Appearance: She is not toxic-appearing. HENT:      Head: Normocephalic and atraumatic. Mouth/Throat:      Mouth: Mucous membranes are moist.   Eyes:      Extraocular Movements: Extraocular movements intact. Pupils: Pupils are equal, round, and reactive to light. Cardiovascular:      Rate and Rhythm: Normal rate and regular rhythm. Heart sounds: Normal heart sounds. No murmur heard. No friction rub. No gallop. Pulmonary:      Effort: Pulmonary effort is normal.      Breath sounds: Normal breath sounds. Abdominal:      General: There is no distension. Palpations: Abdomen is soft. There is no mass. Tenderness: There is no abdominal tenderness. There is no guarding. Hernia: No hernia is present. Musculoskeletal:         General: No swelling, tenderness or deformity. Cervical back: Normal range of motion and neck supple. Skin:     General: Skin is warm and dry. Capillary Refill: Capillary refill takes less than 2 seconds. Findings: No rash. Neurological:      General: No focal deficit present. Mental Status: She is alert and oriented to person, place, and time. Cranial Nerves: No cranial nerve deficit. Sensory: No sensory deficit. Motor: No weakness. Coordination: Coordination normal.   Psychiatric:         Mood and Affect: Mood normal.           Diagnostic Study Results     Labs -  Recent Results (from the past 12 hour(s))   CBC WITH AUTOMATED DIFF    Collection Time: 10/29/21  5:55 PM   Result Value Ref Range    WBC 9.9 4.6 - 13.2 K/uL    RBC 3.81 (L) 4.20 - 5.30 M/uL    HGB 11.0 (L) 12.0 - 16.0 g/dL    HCT 33.9 (L) 35.0 - 45.0 %    MCV 89.0 78.0 - 100.0 FL    MCH 28.9 24.0 - 34.0 PG    MCHC 32.4 31.0 - 37.0 g/dL    RDW 15.0 (H) 11.6 - 14.5 %    PLATELET 221 571 - 480 K/uL    MPV 9.8 9.2 - 11.8 FL    NEUTROPHILS 57 40 - 73 %    LYMPHOCYTES 33 21 - 52 %    MONOCYTES 8 3 - 10 %    EOSINOPHILS 1 0 - 5 %    BASOPHILS 1 0 - 2 %    ABS. NEUTROPHILS 5.7 1.8 - 8.0 K/UL    ABS. LYMPHOCYTES 3.3 0.9 - 3.6 K/UL    ABS. MONOCYTES 0.8 0.05 - 1.2 K/UL    ABS. EOSINOPHILS 0.1 0.0 - 0.4 K/UL    ABS. BASOPHILS 0.1 0.0 - 0.1 K/UL    DF AUTOMATED     METABOLIC PANEL, COMPREHENSIVE    Collection Time: 10/29/21  5:55 PM   Result Value Ref Range    Sodium 137 136 - 145 mmol/L    Potassium 3.7 3.5 - 5.5 mmol/L    Chloride 105 100 - 111 mmol/L    CO2 26 21 - 32 mmol/L    Anion gap 6 3.0 - 18 mmol/L    Glucose 87 74 - 99 mg/dL    BUN 10 7.0 - 18 MG/DL    Creatinine 0.70 0.6 - 1.3 MG/DL    BUN/Creatinine ratio 14 12 - 20      GFR est AA >60 >60 ml/min/1.73m2    GFR est non-AA >60 >60 ml/min/1.73m2    Calcium 8.4 (L) 8.5 - 10.1 MG/DL    Bilirubin, total 0.4 0.2 - 1.0 MG/DL    ALT (SGPT) 39 13 - 56 U/L    AST (SGOT) 27 10 - 38 U/L    Alk.  phosphatase 65 45 - 117 U/L    Protein, total 8.1 6.4 - 8.2 g/dL    Albumin 3.4 3.4 - 5.0 g/dL    Globulin 4.7 (H) 2.0 - 4.0 g/dL    A-G Ratio 0.7 (L) 0.8 - 1.7     CARDIAC PANEL,(CK, CKMB & TROPONIN)    Collection Time: 10/29/21  5:55 PM   Result Value Ref Range    CK - MB <1.0 <3.6 ng/ml    CK-MB Index  0.0 - 4.0 %     CALCULATION NOT PERFORMED WHEN RESULT IS BELOW LINEAR LIMIT     26 - 192 U/L    Troponin-I, QT <0.02 0.0 - 0.045 NG/ML   HCG QL SERUM    Collection Time: 10/29/21  5:55 PM   Result Value Ref Range    HCG, Ql. Negative NEG     MAGNESIUM    Collection Time: 10/29/21  5:55 PM   Result Value Ref Range    Magnesium 2.3 1.6 - 2.6 mg/dL   PROTHROMBIN TIME + INR    Collection Time: 10/29/21  5:55 PM   Result Value Ref Range    Prothrombin time 13.5 11.5 - 15.2 sec    INR 1.0 0.8 - 1.2     EKG, 12 LEAD, INITIAL    Collection Time: 10/29/21  5:56 PM   Result Value Ref Range    Ventricular Rate 67 BPM    Atrial Rate 67 BPM    P-R Interval 158 ms    QRS Duration 76 ms    Q-T Interval 388 ms    QTC Calculation (Bezet) 409 ms    Calculated P Axis 48 degrees    Calculated R Axis 42 degrees    Calculated T Axis 28 degrees    Diagnosis       Poor data quality, interpretation may be adversely affected  Normal sinus rhythm  Normal ECG  When compared with ECG of 11-DEC-2019 21:15,  No significant change was found         Radiologic Studies -   CT HEAD WO CONT   Final Result   Negative noncontrast head CT. XR CHEST PORT    (Results Pending)   MRA BRAIN WO CONT    (Results Pending)   MRI BRAIN WO CONT    (Results Pending)         Medical Decision Making   I am the first provider for this patient. I reviewed the vital signs, available nursing notes, past medical history, past surgical history, family history and social history. Vital Signs-Reviewed the patient's vital signs. Records Reviewed: Nursing Notes (Time of Review: 6:35 PM)    ED Course: Progress Notes, Reevaluation, and Consults:         Provider Notes (Medical Decision Making):   MDM  Number of Diagnoses or Management Options  Diagnosis management comments: 53-year-old female presenting to the emergency department for evaluation of possible TIA. She currently has an NIH of 0. Her blood pressure is elevated with a reading of 177/111. She is otherwise hemodynamically stable. Neurologically intact, moving all extremities equally, alert and oriented x4.   Plan on obtaining screening work-up, as well as CT of her head. Will be monitored closely for any signs of neurological deterioration. 1917:  No acute abnormalities seen on patient's head CT. Blood work is generally unremarkable. She remains neurologically intact, nonfocal. Will discuss case with neurology for further recommendations. 1948:  Case discussed with Dr. Jeremy Brown neurology. Recommends Plavix and aspirin as well as MRA and MRI of the brain. Patient remains with an NIH of 0. Will be admitted for further testing and work-up for CVA             Diagnosis     Clinical Impression:   1. TIA (transient ischemic attack)        Disposition: observation, telemetry    Follow-up Information    None          Patient's Medications   Start Taking    No medications on file   Continue Taking    HYDROCHLOROTHIAZIDE (MICROZIDE) 12.5 MG CAPSULE    Take 1 Cap by mouth daily. ONDANSETRON (ZOFRAN ODT) 4 MG DISINTEGRATING TABLET    Take 1-2 tablets every 6-8 hours as needed for nausea and vomiting. ONDANSETRON HCL (ZOFRAN) 4 MG TABLET    Take 2 Tabs by mouth every eight (8) hours as needed for Nausea. PITAVASTATIN CALCIUM (LIVALO) 4 MG TAB TABLET    Take 1 Tab by mouth daily. PITAVASTATIN CALCIUM (LIVALO) 4 MG TAB TABLET    Take 1 Tab by mouth daily. PROMETHAZINE (PHENERGAN) 50 MG SUPPOSITORY    Insert 1 Suppository into rectum every six (6) hours as needed for Nausea. TRANEXAMIC ACID (LYSTEDA) 650 MG TAB TABLET    Take 1,300 mg by mouth three (3) times daily. Indications: during menses for 5 days   These Medications have changed    No medications on file   Stop Taking    No medications on file     Disclaimer: Sections of this note are dictated using utilizing voice recognition software. Minor typographical errors may be present. If questions arise, please do not hesitate to contact me or call our department.

## 2021-10-29 NOTE — Clinical Note
Patient Class[de-identified] OBSERVATION [104]   Type of Bed: Telemetry [19]   Cardiac Monitoring Required?: Yes   Reason for Observation: tia   Admitting Diagnosis: TIA (transient ischemic attack) [404880]   Admitting Physician: Usman Cavazos   Attending Physician: Maame Eng [[id number]]

## 2021-10-29 NOTE — ED TRIAGE NOTES
Pt arrived from home for hypertension, dizziness,  Left facial drooping event that occurred this morning 0900 and then when pt went home around 1600 today. In triage, pt does not have left sided facial droop, no loss of sensation, no loss of speech.       Pt reports she stop taking HTN medication a few years ago due to loss of insurance

## 2021-10-30 ENCOUNTER — APPOINTMENT (OUTPATIENT)
Dept: VASCULAR SURGERY | Age: 46
End: 2021-10-30
Attending: STUDENT IN AN ORGANIZED HEALTH CARE EDUCATION/TRAINING PROGRAM

## 2021-10-30 ENCOUNTER — APPOINTMENT (OUTPATIENT)
Dept: NON INVASIVE DIAGNOSTICS | Age: 46
End: 2021-10-30
Attending: PHYSICIAN ASSISTANT

## 2021-10-30 VITALS
HEART RATE: 78 BPM | HEIGHT: 65 IN | RESPIRATION RATE: 17 BRPM | DIASTOLIC BLOOD PRESSURE: 109 MMHG | OXYGEN SATURATION: 99 % | TEMPERATURE: 98.1 F | BODY MASS INDEX: 29.32 KG/M2 | WEIGHT: 176 LBS | SYSTOLIC BLOOD PRESSURE: 171 MMHG

## 2021-10-30 LAB
ANION GAP SERPL CALC-SCNC: 4 MMOL/L (ref 3–18)
BUN SERPL-MCNC: 9 MG/DL (ref 7–18)
BUN/CREAT SERPL: 15 (ref 12–20)
CALCIUM SERPL-MCNC: 8.4 MG/DL (ref 8.5–10.1)
CHLORIDE SERPL-SCNC: 107 MMOL/L (ref 100–111)
CHOLEST SERPL-MCNC: 237 MG/DL
CO2 SERPL-SCNC: 25 MMOL/L (ref 21–32)
CREAT SERPL-MCNC: 0.61 MG/DL (ref 0.6–1.3)
ERYTHROCYTE [DISTWIDTH] IN BLOOD BY AUTOMATED COUNT: 14.8 % (ref 11.6–14.5)
EST. AVERAGE GLUCOSE BLD GHB EST-MCNC: 114 MG/DL
GLUCOSE BLD STRIP.AUTO-MCNC: 100 MG/DL (ref 70–110)
GLUCOSE BLD STRIP.AUTO-MCNC: 105 MG/DL (ref 70–110)
GLUCOSE BLD STRIP.AUTO-MCNC: 119 MG/DL (ref 70–110)
GLUCOSE SERPL-MCNC: 84 MG/DL (ref 74–99)
HBA1C MFR BLD: 5.6 % (ref 4.2–5.6)
HCT VFR BLD AUTO: 34.8 % (ref 35–45)
HDLC SERPL-MCNC: 49 MG/DL (ref 40–60)
HDLC SERPL: 4.8 {RATIO} (ref 0–5)
HGB BLD-MCNC: 11.2 G/DL (ref 12–16)
LDLC SERPL CALC-MCNC: 166.2 MG/DL (ref 0–100)
LIPID PROFILE,FLP: ABNORMAL
MCH RBC QN AUTO: 29.1 PG (ref 24–34)
MCHC RBC AUTO-ENTMCNC: 32.2 G/DL (ref 31–37)
MCV RBC AUTO: 90.4 FL (ref 78–100)
PLATELET # BLD AUTO: 250 K/UL (ref 135–420)
PMV BLD AUTO: 9.6 FL (ref 9.2–11.8)
POTASSIUM SERPL-SCNC: 3.9 MMOL/L (ref 3.5–5.5)
RBC # BLD AUTO: 3.85 M/UL (ref 4.2–5.3)
SODIUM SERPL-SCNC: 136 MMOL/L (ref 136–145)
TRIGL SERPL-MCNC: 109 MG/DL (ref ?–150)
VLDLC SERPL CALC-MCNC: 21.8 MG/DL
WBC # BLD AUTO: 8 K/UL (ref 4.6–13.2)

## 2021-10-30 PROCEDURE — 74011000250 HC RX REV CODE- 250: Performed by: INTERNAL MEDICINE

## 2021-10-30 PROCEDURE — 97165 OT EVAL LOW COMPLEX 30 MIN: CPT

## 2021-10-30 PROCEDURE — 74011250636 HC RX REV CODE- 250/636: Performed by: STUDENT IN AN ORGANIZED HEALTH CARE EDUCATION/TRAINING PROGRAM

## 2021-10-30 PROCEDURE — 74011250637 HC RX REV CODE- 250/637: Performed by: INTERNAL MEDICINE

## 2021-10-30 PROCEDURE — 90471 IMMUNIZATION ADMIN: CPT

## 2021-10-30 PROCEDURE — 99220 PR INITIAL OBSERVATION CARE/DAY 70 MINUTES: CPT | Performed by: STUDENT IN AN ORGANIZED HEALTH CARE EDUCATION/TRAINING PROGRAM

## 2021-10-30 PROCEDURE — 2709999900 HC NON-CHARGEABLE SUPPLY

## 2021-10-30 PROCEDURE — 85027 COMPLETE CBC AUTOMATED: CPT

## 2021-10-30 PROCEDURE — 80061 LIPID PANEL: CPT

## 2021-10-30 PROCEDURE — 93306 TTE W/DOPPLER COMPLETE: CPT

## 2021-10-30 PROCEDURE — 80048 BASIC METABOLIC PNL TOTAL CA: CPT

## 2021-10-30 PROCEDURE — 96372 THER/PROPH/DIAG INJ SC/IM: CPT

## 2021-10-30 PROCEDURE — 74011250636 HC RX REV CODE- 250/636: Performed by: PHYSICIAN ASSISTANT

## 2021-10-30 PROCEDURE — 36415 COLL VENOUS BLD VENIPUNCTURE: CPT

## 2021-10-30 PROCEDURE — 97161 PT EVAL LOW COMPLEX 20 MIN: CPT

## 2021-10-30 PROCEDURE — 99239 HOSP IP/OBS DSCHRG MGMT >30: CPT | Performed by: INTERNAL MEDICINE

## 2021-10-30 PROCEDURE — 82962 GLUCOSE BLOOD TEST: CPT

## 2021-10-30 PROCEDURE — 74011250637 HC RX REV CODE- 250/637: Performed by: STUDENT IN AN ORGANIZED HEALTH CARE EDUCATION/TRAINING PROGRAM

## 2021-10-30 PROCEDURE — 74011250637 HC RX REV CODE- 250/637: Performed by: PHYSICIAN ASSISTANT

## 2021-10-30 PROCEDURE — 90686 IIV4 VACC NO PRSV 0.5 ML IM: CPT | Performed by: STUDENT IN AN ORGANIZED HEALTH CARE EDUCATION/TRAINING PROGRAM

## 2021-10-30 PROCEDURE — 93880 EXTRACRANIAL BILAT STUDY: CPT

## 2021-10-30 PROCEDURE — 99218 HC RM OBSERVATION: CPT

## 2021-10-30 RX ORDER — ATORVASTATIN CALCIUM 80 MG/1
80 TABLET, FILM COATED ORAL
Qty: 30 TABLET | Refills: 0 | Status: SHIPPED | OUTPATIENT
Start: 2021-10-30 | End: 2021-11-29 | Stop reason: SDUPTHER

## 2021-10-30 RX ORDER — GUAIFENESIN 100 MG/5ML
162 LIQUID (ML) ORAL DAILY
Qty: 60 TABLET | Refills: 0 | Status: SHIPPED | OUTPATIENT
Start: 2021-10-30 | End: 2021-11-15 | Stop reason: SDUPTHER

## 2021-10-30 RX ORDER — AMLODIPINE BESYLATE 10 MG/1
10 TABLET ORAL DAILY
Status: DISCONTINUED | OUTPATIENT
Start: 2021-10-30 | End: 2021-10-30 | Stop reason: HOSPADM

## 2021-10-30 RX ORDER — CLOPIDOGREL BISULFATE 75 MG/1
75 TABLET ORAL DAILY
Status: DISCONTINUED | OUTPATIENT
Start: 2021-10-30 | End: 2021-10-30 | Stop reason: HOSPADM

## 2021-10-30 RX ORDER — CLOPIDOGREL BISULFATE 75 MG/1
75 TABLET ORAL DAILY
Qty: 20 TABLET | Refills: 0 | Status: SHIPPED | OUTPATIENT
Start: 2021-10-31 | End: 2021-11-20

## 2021-10-30 RX ORDER — HYDROCHLOROTHIAZIDE 12.5 MG/1
12.5 CAPSULE ORAL DAILY
Qty: 30 CAPSULE | Refills: 0 | Status: SHIPPED | OUTPATIENT
Start: 2021-10-30 | End: 2021-11-29 | Stop reason: SDUPTHER

## 2021-10-30 RX ORDER — AMLODIPINE BESYLATE 10 MG/1
10 TABLET ORAL DAILY
Qty: 30 TABLET | Refills: 0 | Status: SHIPPED | OUTPATIENT
Start: 2021-10-30 | End: 2021-11-29 | Stop reason: SDUPTHER

## 2021-10-30 RX ORDER — GUAIFENESIN 100 MG/5ML
81 LIQUID (ML) ORAL DAILY
Qty: 20 TABLET | Refills: 0 | Status: SHIPPED | OUTPATIENT
Start: 2021-10-31 | End: 2021-11-20

## 2021-10-30 RX ORDER — SODIUM CHLORIDE 9 MG/ML
10 INJECTION INTRAMUSCULAR; INTRAVENOUS; SUBCUTANEOUS
Status: COMPLETED | OUTPATIENT
Start: 2021-10-30 | End: 2021-10-30

## 2021-10-30 RX ADMIN — ASPIRIN 325 MG ORAL TABLET 325 MG: 325 PILL ORAL at 01:52

## 2021-10-30 RX ADMIN — SODIUM CHLORIDE 10 ML: 9 INJECTION INTRAMUSCULAR; INTRAVENOUS; SUBCUTANEOUS at 12:12

## 2021-10-30 RX ADMIN — ACETAMINOPHEN 975 MG: 325 TABLET ORAL at 01:51

## 2021-10-30 RX ADMIN — ASPIRIN 81 MG CHEWABLE TABLET 81 MG: 81 TABLET CHEWABLE at 11:05

## 2021-10-30 RX ADMIN — AMLODIPINE BESYLATE 10 MG: 10 TABLET ORAL at 16:03

## 2021-10-30 RX ADMIN — CLOPIDOGREL BISULFATE 300 MG: 75 TABLET ORAL at 01:51

## 2021-10-30 RX ADMIN — CLOPIDOGREL BISULFATE 75 MG: 75 TABLET ORAL at 11:05

## 2021-10-30 RX ADMIN — INFLUENZA VIRUS VACCINE 0.5 ML: 15; 15; 15; 15 SUSPENSION INTRAMUSCULAR at 15:59

## 2021-10-30 RX ADMIN — ENOXAPARIN SODIUM 40 MG: 100 INJECTION SUBCUTANEOUS at 01:47

## 2021-10-30 RX ADMIN — ATORVASTATIN CALCIUM 80 MG: 40 TABLET, FILM COATED ORAL at 01:52

## 2021-10-30 NOTE — PROGRESS NOTES
Reason for Admission:  TIA (transient ischemic attack) [G45.9]                 RUR Score:    N/A            Plan for utilizing home health:    no                      Likelihood of Readmission:   LOW                         Transition of Care Plan:              Initial assessment completed with patient. Cognitive status of patient: oriented to time, place, person and situation. Face sheet information confirmed:  yes. The patient designates Denise chacko to participate in her discharge plan and to receive any needed information. This patient lives in a single family home with son. Patient is able to navigate steps as needed. Prior to hospitalization, patient was considered to be independent with ADLs/IADLS : yes . Patient has a current ACP document on file: no      Healthcare Decision Maker:     Click here to complete [address] including selection of the Healthcare Decision Maker Relationship (ie \"Primary\")    The patient will drive herself home upon discharge. The patient has no DME available in the home. Patient is not currently active with home health. Patient has not stayed in a skilled nursing facility or rehab. This patient is on dialysis :no    Currently, the discharge plan is Home. The patient states that she can obtain her medications from the pharmacy, and take her medications as directed. Patient's current insurance is self pay.        Care Management Interventions  PCP Verified by CM: No  Mode of Transport at Discharge: Self  Transition of Care Consult (CM Consult): Discharge Planning  Support Systems: Child(shree), Other Family Member(s)  Confirm Follow Up Transport: Self  Discharge Location  Discharge Placement: Home        Jessy Chung RN - Outcomes Manager  [phone]

## 2021-10-30 NOTE — ED NOTES
Asked by charge RN to call report on rw pt. Obtained a set of vitals and will call report, this RN is not this pts nurse.

## 2021-10-30 NOTE — CONSULTS
A 42years old female patient with medical history of hypertension not on any medications and follow-up came to the emergency room for transient episode of left facial droop and left upper extremity weakness. Yesterday morning, she was noticed drooping of the left lower face with some numbness. Also involvement of the left upper extremity with weakness and some tremor. No weakness of the lower extremities. No difficulty walking. Claims she has some blurring of her vision on the left side. No changes in her speech and no changes in her mental status. Has mild numbness over the left face and arm. Symptoms resolved within 10 minutes time. Again, she had similar symptoms around 3:30 PM; resolved within 10 minutes. On arrival to the emergency room, her blood pressure was high; as high as 191 x 119. MRI of the brain did not show any acute changes. MRA of the brain was normal.  Started on aspirin and Plavix. She has elevated LDL. Hemoglobin A1c is normal.  No history of smoking or alcohol abuse. She smokes marijuana occasionally. Family history includes possible stroke in her grandmother; mother with diabetes and hypertension. Social History     Socioeconomic History    Marital status: SINGLE     Spouse name: Not on file    Number of children: 1    Years of education: 6    Highest education level: 11th grade   Occupational History    Occupation: care giver counselor   Tobacco Use    Smoking status: Former Smoker     Packs/day: 0.50     Years: 10.00     Pack years: 5.00     Quit date: 7/3/2017     Years since quittin.3    Smokeless tobacco: Never Used   Substance and Sexual Activity    Alcohol use:  Yes     Alcohol/week: 6.0 standard drinks     Types: 6 Glasses of wine per week    Drug use: Yes     Types: Marijuana    Sexual activity: Yes     Partners: Male     Birth control/protection: Condom   Other Topics Concern     Service No    Blood Transfusions No    Caffeine Concern No  Occupational Exposure No    Hobby Hazards No    Sleep Concern No    Stress Concern Yes    Weight Concern No    Special Diet No    Back Care No    Exercise Yes     Comment: walks    Bike Helmet No    Seat Belt Yes    Self-Exams No   Social History Narrative    Not on file     Social Determinants of Health     Financial Resource Strain:     Difficulty of Paying Living Expenses:    Food Insecurity:     Worried About Running Out of Food in the Last Year:     Ran Out of Food in the Last Year:    Transportation Needs:     Lack of Transportation (Medical):      Lack of Transportation (Non-Medical):    Physical Activity:     Days of Exercise per Week:     Minutes of Exercise per Session:    Stress:     Feeling of Stress :    Social Connections:     Frequency of Communication with Friends and Family:     Frequency of Social Gatherings with Friends and Family:     Attends Taoism Services:     Active Member of Clubs or Organizations:     Attends Club or Organization Meetings:     Marital Status:    Intimate Partner Violence:     Fear of Current or Ex-Partner:     Emotionally Abused:     Physically Abused:     Sexually Abused:        Family History   Problem Relation Age of Onset    Hypertension Mother     Diabetes Mother     Hypertension Father     Heart Disease Father     Stroke Maternal Grandmother     Lung Cancer Maternal Grandmother         Current Facility-Administered Medications   Medication Dose Route Frequency Provider Last Rate Last Admin    influenza vaccine 2021-22 (6 mos+)(PF) (FLUARIX/FLULAVAL/FLUZONE QUAD) injection 0.5 mL  1 Each IntraMUSCular PRIOR TO DISCHARGE Frannie Riggins N, DO        clopidogreL (PLAVIX) tablet 75 mg  75 mg Oral DAILY Kevin BARRIENTOS MD        aspirin chewable tablet 81 mg  81 mg Oral DAILY Elena Esquivel PA        atorvastatin (LIPITOR) tablet 80 mg  80 mg Oral QHS Elena Esquivel PA   80 mg at 10/30/21 0152    acetaminophen (TYLENOL) tablet 650 mg  650 mg Oral Q4H PRN Gerardo Esquivel PA        enoxaparin (LOVENOX) injection 40 mg  40 mg SubCUTAneous Q24H Veroniac Esquivel PA   40 mg at 10/30/21 0147    labetaloL (NORMODYNE;TRANDATE) 20 mg/4 mL (5 mg/mL) injection 5 mg  5 mg IntraVENous Q10MIN PRN Gerardo Esquivel PA           Past Medical History:   Diagnosis Date    Breast cancer screening by mammogram 08/16/2019    Breast biopsy benign. May resume routine annual screenings.  Hypertension        Past Surgical History:   Procedure Laterality Date    HX BREAST BIOPSY  6/2006    Right-benign    HX OOPHORECTOMY      HX OTHER SURGICAL  1/2013    left neck cyst removal    HX OTHER SURGICAL Right 03/2017    Pelvic surgery possibly ovary       No Known Allergies    Patient Active Problem List   Diagnosis Code    TIA (transient ischemic attack) G45.9    HTN (hypertension) I10         Review of Systems:   Constitutional no fever or chills  Skin denies rash or itching  HENT  Denies tinnitus, hearing lose  Eyes had mild blurring of vision yesterday. Respiratory denies shortness of breath  Cardiovascular denies chest pain  Gastrointestinal: Had some nausea; no vomiting. Genitourinary denies incontinence  Musculoskeletal denies joint pain or swelling  Hematology denies easy bruising or bleeding   Neurological as above in HPI      PHYSICAL EXAMINATION:      VITAL SIGNS:    Visit Vitals  BP (!) 137/95 (BP 1 Location: Right upper arm)   Pulse 75   Temp 98.1 °F (36.7 °C)   Resp 16   Ht 5' 5\" (1.651 m)   Wt 79.8 kg (176 lb)   SpO2 99%   Breastfeeding No   BMI 29.29 kg/m²       GENERAL: In no apparent distress. EXTREMITIES: Muscle tone is normal.  HEAD:   The patient is normocephalic. NEUROLOGIC EXAMINATION  Mental status: Awake, alert, oriented x3, follows simple and complex  commands, no neglect, no extinction to DSS or VSS.   Speech and languge: fluent, coherent,  and comprehension intact  CN: VFF, EOMI, PERRLA, face sensation intact , no facial asymmetry noted, palate elevation symmetric bilat, SS+SCM 5/5 bilat, tongue midline  Motor: no pronator drift, tone normal throughout, strength 5/5 throughout  Sensory: intact to light touch throughout  Coordination: FNF, HS accurate w/o dysmetria  DTR: 2+ throughout, toes downgoing BL  Gait: not tested     Exam Information    Status Exam Begun  Exam Ended    Final [99] 10/29/2021 22:54 10/29/2021 11:44 PM [id number] 11:44 PM   Result Information    Status: Final result (Exam End: 10/29/2021 23:44) Provider Status: Open   Study Result    Narrative & Impression   MR BRAIN WITHOUT CONTRAST     HISTORY: Lightheadedness, left facial droop     COMPARISON: CT 10/29/2021 at 1825     TECHNIQUE: Axial and sagittal T1W scans, axial T2W scans, axial FLAIR, axial  swan, and axial diffusion weighted images.     FINDINGS:      Brain parenchyma: No acute infarct, hemorrhage, or mass effect. Minimal  scattered T2 FLAIR hyperintense base are nonspecific but may represent sequela  from prior infectious/inflammatory process.     Extra-axial spaces, ventricles, basal cisterns: No extra-axial fluid collections  are identified. The ventricles and sulci are within normal limits for patient's  age. Basal cisterns are patent.      Orbits and paranasal sinuses: Orbits are within normal limits for nondedicated  examination. Included paranasal sinuses and mastoid air cells are clear.     Calvarium and skull base: Calvarium is normal limits.  The sella turcica is  within normal limits.     Visualized upper cervical spine: Craniocervical junction is within normal  limits.      IMPRESSION  No acute infarct, hemorrhage, or mass lesion.         Result Information    Status: Final result (Exam End: 10/29/2021 23:43) Provider Status: Open   Study Result    Narrative & Impression   MRA WITHOUT CONTRAST     HISTORY: Lightheadedness, left facial droop     COMPARISON: CT performed 4 hours prior at 1825     TECHNIQUE: 3-D time of flight MR angiography of the intracranial arteries was  obtained and portrayed in raw data and maximum intensity projection formats.     FINDINGS: Examination is motion degraded.     ANTERIOR CIRCULATION:   Internal carotid: The petrous, cavernous and supraclinoid segments of the  internal carotid arteries are patent. Anterior cerebral: Bilateral A1 and proximal A2 segments are within normal  limits. Anterior communicating: Poorly visualized. .  Middle cerebral: The bilateral M1 and M2 segments are within normal limits. Posterior communicating:  Not visualized.     Posterior circulation:   Vertebral: Patent bilaterally  Basilar: Patent. Superior cerebellar: Origins are patent bilaterally. Posterior cerebral: Patent.     IMPRESSION     Patent intracranial cerebral arteries. CBC:   Lab Results   Component Value Date/Time    WBC 8.0 10/30/2021 04:09 AM    RBC 3.85 (L) 10/30/2021 04:09 AM    HGB 11.2 (L) 10/30/2021 04:09 AM    HCT 34.8 (L) 10/30/2021 04:09 AM    PLATELET 644 71/83/3589 04:09 AM     BMP:   Lab Results   Component Value Date/Time    Glucose 84 10/30/2021 04:09 AM    Sodium 136 10/30/2021 04:09 AM    Potassium 3.9 10/30/2021 04:09 AM    Chloride 107 10/30/2021 04:09 AM    CO2 25 10/30/2021 04:09 AM    BUN 9 10/30/2021 04:09 AM    Creatinine 0.61 10/30/2021 04:09 AM    Calcium 8.4 (L) 10/30/2021 04:09 AM     CMP:   Lab Results   Component Value Date/Time    Glucose 84 10/30/2021 04:09 AM    Sodium 136 10/30/2021 04:09 AM    Potassium 3.9 10/30/2021 04:09 AM    Chloride 107 10/30/2021 04:09 AM    CO2 25 10/30/2021 04:09 AM    BUN 9 10/30/2021 04:09 AM    Creatinine 0.61 10/30/2021 04:09 AM    Calcium 8.4 (L) 10/30/2021 04:09 AM    Anion gap 4 10/30/2021 04:09 AM    BUN/Creatinine ratio 15 10/30/2021 04:09 AM    Alk.  phosphatase 65 10/29/2021 05:55 PM    Protein, total 8.1 10/29/2021 05:55 PM    Albumin 3.4 10/29/2021 05:55 PM    Globulin 4.7 (H) 10/29/2021 05:55 PM    A-G Ratio 0.7 (L) 10/29/2021 05:55 PM     Coagulation:   Lab Results   Component Value Date/Time    Prothrombin time 13.5 10/29/2021 05:55 PM    INR 1.0 10/29/2021 05:55 PM       Impression:   A 42years old female patient with medical history of hypertension was brought to the emergency room for left facial droop, dragging sensation, and left upper extremity weakness which lasted for about 10 minutes. She had similar symptoms after about 7 to 8 hours; again resolved within 10 minutes. Neuro exam at this time is unremarkable. No mental status changes. Her blood pressure was significantly elevated. Patient has a long history of hypertension but was not taking any blood pressure medications and did not have any follow-up. Her lipid panel showed elevated LDL. Hemoglobin A1c is normal.  MRI of the brain did not show any acute stroke. MRA of the brain is unremarkable. MRA of the neck not done. Will get carotid Doppler ultrasound. Possible TIA. Plan:   -Continuous telemetry/up with assistance  -Vitals/Neurochecks q4hrs  -MRI and MRA of the brain as above  -CDUS and TTE pending  -blood pressure management per the primary team  -Dual antiplatelets for 21 days: Aspirin 81 mg p.o. per day and Plavix 75 mg p.o. per day; then will continue with aspirin alone at 81 mg p.o. per day  -Continue with atorvastatin 80 mg p.o. daily  -QAM CBC, BMP, Mag, Phos, Ionized Ca  -PT/OT/ST eval                PLEASE NOTE:   This document has been produced using voice recognition software. Unrecognized errors in transcription may be present.

## 2021-10-30 NOTE — ROUTINE PROCESS
Primary Nurse Onel Lynn RN and Yancy Brice RN performed a dual skin assessment on this patient No impairment noted  Jose score is 23

## 2021-10-30 NOTE — PROGRESS NOTES
OCCUPATIONAL THERAPY EVALUATION/DISCHARGE    Patient: Keith Lovett (78 y.o. female)  Date: 10/30/2021  Primary Diagnosis: TIA (transient ischemic attack) [G45.9]        Precautions:    (standard )  PLOF: Pt reports being independent with ADLs and functional mobility. Pt works as . ASSESSMENT AND RECOMMENDATIONS:  Based on the objective data described below, the patient presents with ability to perform basic ADLs and functional transfers at baseline level of function. Pt performed/simulated UB/LB ADLs with Mod Ind for seated and std aspects. Pt demonstrates good safety awareness during all standing tasks. Pt reports mild dizziness upon standing, BP assessed 160/107, per pt this is higher than previous reading taken by nurse while supine. Pt's nurse notified. Pt educated on pacing of activity and safety with functional mobility and ADLs in order to prevent increase in symptoms and falls. Pt verbalized understanding. Stroke Education: Patient educated on signs/symptoms of stroke and importance of early intervention. She verbalized understanding. Skilled occupational therapy is not indicated at this time. 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     OBJECTIVE DATA SUMMARY:     Past Medical History:   Diagnosis Date    Breast cancer screening by mammogram 08/16/2019    Breast biopsy benign. May resume routine annual screenings.     Hypertension      Past Surgical History:   Procedure Laterality Date    HX BREAST BIOPSY  6/2006    Right-benign    HX OOPHORECTOMY      HX OTHER SURGICAL  1/2013    left neck cyst removal    HX OTHER SURGICAL Right 03/2017    Pelvic surgery possibly ovary     Barriers to Learning/Limitations: None  Compensate with: visual, verbal, tactile, kinesthetic cues/model    Home Situation:   Home Situation  Home Environment: Private residence  # Steps to Enter: 1  One/Two Story Residence: One story  Living Alone: No  Support Systems: Other Family Member(s)  Patient Expects to be Discharged to[de-identified] House  Current DME Used/Available at Home: None  Tub or Shower Type: Tub/Shower combination  [x]     Right hand dominant   []     Left hand dominant    Cognitive/Behavioral Status:  Neurologic State: Alert  Orientation Level: Oriented X4  Cognition: Follows commands  Safety/Judgement: Awareness of environment; Fall prevention    Skin: visible skin intact  Edema: none noted    Vision/Perceptual:       Acuity: Able to read clock/calendar on wall without difficulty     Coordination: BUE  Coordination: Within functional limits       Balance:  Sitting: Intact  Standing: Intact    Strength: BUE  Strength: Within functional limits   Tone & Sensation: BUE  Tone: Normal  Sensation: Intact   Range of Motion: BUE  AROM: Within functional limits   Functional Mobility and Transfers for ADLs:  Bed Mobility:     Supine to Sit: Independent  Sit to Supine: Independent  Scooting: Independent  Transfers:  Sit to Stand: Modified independent  Stand to Sit: Modified independent   Toilet Transfer : Modified independent    Bathroom Mobility: Modified independent    ADL Assessment:  Feeding: Setup  Oral Facial Hygiene/Grooming: Modified Independent  Bathing: Modified independent  Upper Body Dressing: Modified independent  Lower Body Dressing: Modified independent  Toileting: Modified independent   ADL Intervention:     Cognitive Retraining  Safety/Judgement: Awareness of environment; Fall prevention  Pain:  Pain level pre-treatment: 0/10   Pain level post-treatment: 0/10     Activity Tolerance:   Fair  Please refer to the flowsheet for vital signs taken during this treatment.   After treatment:   []  Patient left in no apparent distress sitting up in chair  [x]  Patient left in no apparent distress in bed  [x]  Call bell left within reach  [x]  Nursing notified  []  Caregiver present  []  Bed alarm activated    COMMUNICATION/EDUCATION:   [x]      Role of Occupational Therapy in the acute care setting  [x]      Home safety education was provided and the patient/caregiver indicated understanding. [x]      Patient/family have participated as able and agree with findings and recommendations. []      Patient is unable to participate in plan of care at this time. Thank you for this referral.  Asia Elliott, OTR/L  Time Calculation: 8 mins      Eval Complexity: History: LOW Complexity : Brief history review ; Examination: LOW Complexity : 1-3 performance deficits relating to physical, cognitive , or psychosocial skils that result in activity limitations and / or participation restrictions ;    Decision Making:LOW Complexity : No comorbidities that affect functional and no verbal or physical assistance needed to complete eval tasks

## 2021-10-30 NOTE — DISCHARGE INSTRUCTIONS
Patient Education   Make sure you have your blood pressure measured at least daily. If you do not have a blood pressure cuff at home please go to the pharmacy that has pressure monitoring station. Ideally the top #4 your blood pressure should be between 100 and 130. For the bottom number ideally it should be less than 90. If your blood pressure is consistently above 150 for the top number please contact your primary care physician as it likely means you need additional blood pressure medication or may need to increase the doses of the ones you are already taking, but do not increase on your own please make sure you contact your provider. If your blood pressure is consistently below 100 for the top number also call your primary care physician or come to the emergency room as that number is indicating that your blood pressure is too low. Stop taking your blood pressure medications if the top number is less than 100. Return to the emergency room or call your primary care physician if you start having chest pains, headaches, shortness of breath, if you feel like the room is spinning around or if you feel like passing out or you pass out or if you have other concerns. Please make sure you take your blood pressure medications as instructed as long as you are blood pressure is greater than 100 for the top number. Also take Plavix 75 mg and aspirin 81 mg daily for 20 more days and then which to aspirin 162 mg daily indefinitely. DASH Diet: Care Instructions  Your Care Instructions     The DASH diet is an eating plan that can help lower your blood pressure. DASH stands for Dietary Approaches to Stop Hypertension. Hypertension is high blood pressure. The DASH diet focuses on eating foods that are high in calcium, potassium, and magnesium. These nutrients can lower blood pressure.  The foods that are highest in these nutrients are fruits, vegetables, low-fat dairy products, nuts, seeds, and legumes. But taking calcium, potassium, and magnesium supplements instead of eating foods that are high in those nutrients does not have the same effect. The DASH diet also includes whole grains, fish, and poultry. The DASH diet is one of several lifestyle changes your doctor may recommend to lower your high blood pressure. Your doctor may also want you to decrease the amount of sodium in your diet. Lowering sodium while following the DASH diet can lower blood pressure even further than just the DASH diet alone. Follow-up care is a key part of your treatment and safety. Be sure to make and go to all appointments, and call your doctor if you are having problems. It's also a good idea to know your test results and keep a list of the medicines you take. How can you care for yourself at home? Following the DASH diet  · Eat 4 to 5 servings of fruit each day. A serving is 1 medium-sized piece of fruit, ½ cup chopped or canned fruit, 1/4 cup dried fruit, or 4 ounces (½ cup) of fruit juice. Choose fruit more often than fruit juice. · Eat 4 to 5 servings of vegetables each day. A serving is 1 cup of lettuce or raw leafy vegetables, ½ cup of chopped or cooked vegetables, or 4 ounces (½ cup) of vegetable juice. Choose vegetables more often than vegetable juice. · Get 2 to 3 servings of low-fat and fat-free dairy each day. A serving is 8 ounces of milk, 1 cup of yogurt, or 1 ½ ounces of cheese. · Eat 6 to 8 servings of grains each day. A serving is 1 slice of bread, 1 ounce of dry cereal, or ½ cup of cooked rice, pasta, or cooked cereal. Try to choose whole-grain products as much as possible. · Limit lean meat, poultry, and fish to 2 servings each day. A serving is 3 ounces, about the size of a deck of cards. · Eat 4 to 5 servings of nuts, seeds, and legumes (cooked dried beans, lentils, and split peas) each week. A serving is 1/3 cup of nuts, 2 tablespoons of seeds, or ½ cup of cooked beans or peas.   · Limit fats and oils to 2 to 3 servings each day. A serving is 1 teaspoon of vegetable oil or 2 tablespoons of salad dressing. · Limit sweets and added sugars to 5 servings or less a week. A serving is 1 tablespoon jelly or jam, ½ cup sorbet, or 1 cup of lemonade. · Eat less than 2,300 milligrams (mg) of sodium a day. If you limit your sodium to 1,500 mg a day, you can lower your blood pressure even more. · Be aware that all of these are the suggested number of servings for people who eat 1,800 to 2,000 calories a day. Your recommended number of servings may be different if you need more or fewer calories. Tips for success  · Start small. Do not try to make dramatic changes to your diet all at once. You might feel that you are missing out on your favorite foods and then be more likely to not follow the plan. Make small changes, and stick with them. Once those changes become habit, add a few more changes. · Try some of the following:  ? Make it a goal to eat a fruit or vegetable at every meal and at snacks. This will make it easy to get the recommended amount of fruits and vegetables each day. ? Try yogurt topped with fruit and nuts for a snack or healthy dessert. ? Add lettuce, tomato, cucumber, and onion to sandwiches. ? Combine a ready-made pizza crust with low-fat mozzarella cheese and lots of vegetable toppings. Try using tomatoes, squash, spinach, broccoli, carrots, cauliflower, and onions. ? Have a variety of cut-up vegetables with a low-fat dip as an appetizer instead of chips and dip. ? Sprinkle sunflower seeds or chopped almonds over salads. Or try adding chopped walnuts or almonds to cooked vegetables. ? Try some vegetarian meals using beans and peas. Add garbanzo or kidney beans to salads. Make burritos and tacos with mashed baker beans or black beans. Where can you learn more?   Go to http://www.gray.com/  Enter H967 in the search box to learn more about \"DASH Diet: Care Instructions. \"  Current as of: April 29, 2021               Content Version: 13.0  © 2721-8993 [employer] Care instructions adapted under license by [employer] (which disclaims liability or warranty for this information). If you have questions about a medical condition or this instruction, always ask your healthcare professional. [address] any warranty or liability for your use of this information. Patient armband removed and shredded  DISCHARGE SUMMARY from Nurse    PATIENT INSTRUCTIONS:    After general anesthesia or intravenous sedation, for 24 hours or while taking prescription Narcotics:  · Limit your activities  · Do not drive and operate hazardous machinery  · Do not make important personal or business decisions  · Do  not drink alcoholic beverages  · If you have not urinated within 8 hours after discharge, please contact your surgeon on call. Report the following to your surgeon:  · Excessive pain, swelling, redness or odor of or around the surgical area  · Temperature over 100.5  · Nausea and vomiting lasting longer than 4 hours or if unable to take medications  · Any signs of decreased circulation or nerve impairment to extremity: change in color, persistent  numbness, tingling, coldness or increase pain  · Any questions    What to do at Home:  Recommended activity: Activity as tolerated. If you experience any of the following symptoms worsening stroke symptoms, fever greater than 100.5, please follow up with PCP or call 911. *  Please give a list of your current medications to your Primary Care Provider. *  Please update this list whenever your medications are discontinued, doses are      changed, or new medications (including over-the-counter products) are added. *  Please carry medication information at all times in case of emergency situations.     These are general instructions for a healthy lifestyle:    No smoking/ No tobacco products/ Avoid exposure to second hand smoke  Surgeon General's Warning:  Quitting smoking now greatly reduces serious risk to your health. Obesity, smoking, and sedentary lifestyle greatly increases your risk for illness    A healthy diet, regular physical exercise & weight monitoring are important for maintaining a healthy lifestyle    You may be retaining fluid if you have a history of heart failure or if you experience any of the following symptoms:  Weight gain of 3 pounds or more overnight or 5 pounds in a week, increased swelling in our hands or feet or shortness of breath while lying flat in bed. Please call your doctor as soon as you notice any of these symptoms; do not wait until your next office visit. The discharge information has been reviewed with the patient. The patient verbalized understanding. Discharge medications reviewed with the patient and appropriate educational materials and side effects teaching were provided.   ___________________________________________________________________________________________________________________________________

## 2021-10-30 NOTE — ROUTINE PROCESS
0720  -- Bedside, Verbal and Written shift change report received by Sirisah Saha (oncoming nurse) by Wandy Mcqueen nurse). Allergy band placed on pt's wrist. Report included the following information SBAR, Kardex, Intake/Output, MAR and Recent Results. 0840-Assessment completed, call bell within reach, no distress noted. 1105- AM  medications administered, pt tolerated with ease, will continue to monitor. 1200 -- Shift reassessment, pt condition unchanged, will continue to monitor. 1600 --  Shift reassessment, pt condition unchanged, will continue to monitor. Discharge instructions given verbalized understanding.-As part of the discharge instructions, medications already given today were discussed with the patient. The next dose due of all ordered meds was highlighted as part of the medication discharge instructions. Discussed with the patient the importance of taking medications as directed, as well as the side effects and adverse reactions to medications ordered. 1630-discharged home via wheel chair.

## 2021-10-30 NOTE — ED NOTES
Pt transported to 419 via wheelchair by this RN. Pt awake, alert and in NAD. No slurred speech noted. Receiving RN aware of the pt.

## 2021-10-30 NOTE — PROGRESS NOTES
SLP NOTE -    SLP evaluation orders received. Upon chart review and discussion with pt, no skilled SLP evaluation indicated at this time. Pt tolerating regular diet with thin liquids with no reported distress (passed dysphagia screen). Pt endorses and SLP observed speech, language, voice, cognition within functional limits. Educated with regard to role of SLP, s/sx aspiration and to alert MD/RN if symptoms arise. Pt verbalized understanding. Will sign off.      Thank you for this referral.    Jodie Chou M.S., [id number] [facility]  Speech-Language Pathologist

## 2021-10-30 NOTE — ROUTINE PROCESS
TRANSFER - IN REPORT:    Verbal report received from Adarsh(name) on Aishwarya Phelps  being received from ER(unit) for routine progression of care      Report consisted of patients Situation, Background, Assessment and   Recommendations(SBAR). Information from the following report(s) SBAR and Kardex was reviewed with the receiving nurse. Opportunity for questions and clarification was provided. Assessment completed upon patients arrival to unit and care assumed.

## 2021-10-30 NOTE — PROGRESS NOTES
Problem: Mobility Impaired (Adult and Pediatric)  Goal: *Acute Goals and Plan of Care (Insert Text)  Outcome: Resolved/Met   PHYSICAL THERAPY EVALUATION AND DISCHARGE    Patient: Liliana Samuels (35 y.o. female)  Date: 10/30/2021  Primary Diagnosis: TIA (transient ischemic attack) [G45.9]        Precautions:    (standard )    PLOF: Pt independent without AD, lives in 1 story house with family    ASSESSMENT :  Based on the objective data described below, the patient presents with baseline functional mobility. Pt has been toileting independently in room, demonstrating independent bed mobility, Anisa transfers and ambulation. Pt ambulating with slow steady gait  in room x50 feet. Pt with no questions or concerns regarding mobility. Will sign off. Patient does not require further skilled intervention at this level of care. PLAN :  Recommendations and Planned Interventions:   No formal PT needs identified at this time. Discharge Recommendations: None  Further Equipment Recommendations for Discharge: N/A     SUBJECTIVE:   Patient stated I just have to take my time.     OBJECTIVE DATA SUMMARY:     Past Medical History:   Diagnosis Date    Breast cancer screening by mammogram 08/16/2019    Breast biopsy benign. May resume routine annual screenings.     Hypertension      Past Surgical History:   Procedure Laterality Date    HX BREAST BIOPSY  6/2006    Right-benign    HX OOPHORECTOMY      HX OTHER SURGICAL  1/2013    left neck cyst removal    HX OTHER SURGICAL Right 03/2017    Pelvic surgery possibly ovary     Barriers to Learning/Limitations: None  Compensate with: N/A  Home Situation:   Home Situation  Home Environment: Private residence  # Steps to Enter: 1  One/Two Story Residence: One story  Living Alone: No  Support Systems: Other Family Member(s)  Patient Expects to be Discharged to[de-identified] [employer]  Current DME Used/Available at Home: None  Critical Behavior:  Neurologic State: Alert  Orientation Level: Oriented X4  Cognition: Appropriate decision making  Safety/Judgement: Awareness of environment; Fall prevention  Psychosocial  Patient Behaviors: Calm; Cooperative  Purposeful Interaction: Yes    Strength:    Strength: Within functional limits    Tone & Sensation:   Tone: Normal    Sensation: Intact    Range Of Motion:  AROM: Within functional limits    Posture:  Posture (WDL): Within defined limits      Functional Mobility:  Bed Mobility:     Supine to Sit: Independent  Sit to Supine: Independent  Scooting: Independent  Transfers:  Sit to Stand: Modified independent  Stand to Sit: Modified independent    Balance:   Sitting: Intact  Standing: Intact    Ambulation/Gait Training:    Gait Description (WDL): Within defined limits    Pain:  Pain level pre-treatment: 0/10   Pain level post-treatment: 0/10    Activity Tolerance:   Good activity tolerance     Please refer to the flowsheet for vital signs taken during this treatment. After treatment:   []         Patient left in no apparent distress sitting up in chair  [x]         Patient left in no apparent distress in bed  [x]         Call bell left within reach  [x]         Nursing notified  []         Caregiver present  []         Bed alarm activated  []         SCDs applied    COMMUNICATION/EDUCATION:   [x]         Role of Physical Therapy in the acute care setting. [x]         Fall prevention education was provided and the patient/caregiver indicated understanding. []         Patient/family have participated as able in goal setting and plan of care. []         Patient/family agree to work toward stated goals and plan of care. []         Patient understands intent and goals of therapy, but is neutral about his/her participation. []         Patient is unable to participate in goal setting/plan of care: ongoing with therapy staff.  []         Other:     Thank you for this referral.  Cyril Gonzalez, PT   Time Calculation: 8 mins      Eval Complexity: History: MEDIUM  Complexity : 1-2 comorbidities / personal factors will impact the outcome/ POC Exam:LOW Complexity : 1-2 Standardized tests and measures addressing body structure, function, activity limitation and / or participation in recreation  Presentation: LOW Complexity : Stable, uncomplicated  Clinical Decision Making:Low Complexity low  Overall Complexity:LOW

## 2021-10-30 NOTE — PROGRESS NOTES
[address] October 30, 2021       RE: Miguelito Fernandez      To Whom It May Concern,    This is to certify that Miguelito Fernandez was hospitalized on 10/29/21 and may may return to work on Thursday 11/4/21. Please feel free to contact my office if you have any questions or concerns. Thank you for your assistance in this matter.       Sincerely,  Arnold Eugene MD  Hospitalist  [phone]

## 2021-10-30 NOTE — ROUTINE PROCESS
Bedside and Verbal shift change report given to [facility] (oncoming nurse) by Gisselle Kunz (offgoing nurse). Report included the following information SBAR and Kardex.

## 2021-10-30 NOTE — DISCHARGE SUMMARY
[facility]  Discharge Summary       Patient: Keith Lovett Age: 42 y.o. : 1975 MR#: [id number] SSN: [id number]  PCP on record: None  Admit date: 10/29/2021  Discharge date: 10/30/2021    Consults:  -BUZZ Monet,-neurology  -   Procedures: none  -     Significant Diagnostic Studies: -Head CT 10/29/21:  IMPRESSION  Negative noncontrast head CT.  - CXR 10/29/21:  IMPRESSION     No acute findings. -MRA brain 10/29/21:  IMPRESSION     Patent intracranial cerebral arteries. -MRI brain 10/29/21:  IMPRESSION  No acute infarct, hemorrhage, or mass lesion.     -Cardiac echo 10/30/21:  Result status: Final result   · IAS: Agitated saline contrast study was performed. There was no shunting at baseline or with Valsalva. · LV: Estimated LVEF is 55 - 60%. Visually measured ejection fraction. Normal cavity size, systolic function (ejection fraction normal) and diastolic function. Mild concentric hypertrophy. Wall motion: normal.  · LA: Left Atrium volume index is 29 mL/m2. · Saline contrast was given to evaluate for intracardiac shunt.         -Carotid duplex 10/30/21:  · Normal extracranial cerebrovascular examination  · No evidence of significant stenosis in the external carotid arteries. · Antegrade vertebral arteries bilaterally. · Normal velocities in the the subclavian arteries bilaterally. Discharge Diagnoses:                                           Patient Active Problem List   Diagnosis Code    TIA (transient ischemic attack) G45.9    HTN (hypertension) [facility] Course by Problem       Is a 44-year-old female with history of hypertension noncompliant due to not having insurance for medications who was admitted after she presented with complaints of feeling lightheaded intermittently and 2 episodes of left-sided facial droop. Also had complaints of intermittent right arm tingling.   In the emergency room her blood pressure was elevated systolic and 70M diastolic 672 and reported that her systolic blood pressures at home had been greater than 180. Patient was admitted and had several green imaging studies including CT and MRI. Her brain vasculature was evaluated with MRA and she had carotid duplex for her neck vasculature. All the studies were negative. Cardiac echo did not show any shunting and showed normal ejection fraction. No reports of cardiac arrhythmia during her stay here. Her presentation is likely due to TIA versus hypertensive emergency. She has been discharged on DAPT for 21 days and then aspirin indefinitely as well as high-dose statin and blood pressure medications. He has been counseled on lifestyle modifications. She is getting assistance with pain for her medications through the hospital program for the next 1 month and she is aware. She has been instructed to find a primary care physician and find ways to continue taking her medications to avoid future events. She has expressed understanding. Date of discharge she reported feeling better. She reported that she has been ambulating in her room without significant events despite her reporting continued feeling of lightheadedness. She has been instructed on safe ways to mobilize in the setting of feeling lightheaded to avoid syncope. She is expressed understanding. She has been given instructions on signs and symptoms to return to the emergency room with or call her primary care physician when she finds 1. She has been given blood pressure parameters as well. Overall she has been discharged in stable condition.     Today's examination of the patient revealed:     Subjective:     Objective:   VS:   Visit Vitals  BP (!) 171/109   Pulse 78   Temp 98.1 °F (36.7 °C)   Resp 17   Ht 5' 5\" (1.651 m)   Wt 79.8 kg (176 lb)   SpO2 99%   Breastfeeding No   BMI 29.29 kg/m²      Tmax/24hrs: Temp (24hrs), Av.1 °F (36.7 °C), Min:97.8 °F (36.6 °C), Max:98.4 °F (36.9 °C) Input/Output: No intake or output data in the 24 hours ending 10/30/21 1348    General:  Alert, awake, in nad  Cardiovascular:  No jvd no peripheral edema  Pulmonary:  Without increased wob  GI:  Soft, nt, nd  Extremities:  No edema  Additional:      Labs:    Recent Results (from the past 24 hour(s))   CBC WITH AUTOMATED DIFF    Collection Time: 10/29/21  5:55 PM   Result Value Ref Range    WBC 9.9 4.6 - 13.2 K/uL    RBC 3.81 (L) 4.20 - 5.30 M/uL    HGB 11.0 (L) 12.0 - 16.0 g/dL    HCT 33.9 (L) 35.0 - 45.0 %    MCV 89.0 78.0 - 100.0 FL    MCH 28.9 24.0 - 34.0 PG    MCHC 32.4 31.0 - 37.0 g/dL    RDW 15.0 (H) 11.6 - 14.5 %    PLATELET 952 652 - 301 K/uL    MPV 9.8 9.2 - 11.8 FL    NEUTROPHILS 57 40 - 73 %    LYMPHOCYTES 33 21 - 52 %    MONOCYTES 8 3 - 10 %    EOSINOPHILS 1 0 - 5 %    BASOPHILS 1 0 - 2 %    ABS. NEUTROPHILS 5.7 1.8 - 8.0 K/UL    ABS. LYMPHOCYTES 3.3 0.9 - 3.6 K/UL    ABS. MONOCYTES 0.8 0.05 - 1.2 K/UL    ABS. EOSINOPHILS 0.1 0.0 - 0.4 K/UL    ABS. BASOPHILS 0.1 0.0 - 0.1 K/UL    DF AUTOMATED     METABOLIC PANEL, COMPREHENSIVE    Collection Time: 10/29/21  5:55 PM   Result Value Ref Range    Sodium 137 136 - 145 mmol/L    Potassium 3.7 3.5 - 5.5 mmol/L    Chloride 105 100 - 111 mmol/L    CO2 26 21 - 32 mmol/L    Anion gap 6 3.0 - 18 mmol/L    Glucose 87 74 - 99 mg/dL    BUN 10 7.0 - 18 MG/DL    Creatinine 0.70 0.6 - 1.3 MG/DL    BUN/Creatinine ratio 14 12 - 20      GFR est AA >60 >60 ml/min/1.73m2    GFR est non-AA >60 >60 ml/min/1.73m2    Calcium 8.4 (L) 8.5 - 10.1 MG/DL    Bilirubin, total 0.4 0.2 - 1.0 MG/DL    ALT (SGPT) 39 13 - 56 U/L    AST (SGOT) 27 10 - 38 U/L    Alk.  phosphatase 65 45 - 117 U/L    Protein, total 8.1 6.4 - 8.2 g/dL    Albumin 3.4 3.4 - 5.0 g/dL    Globulin 4.7 (H) 2.0 - 4.0 g/dL    A-G Ratio 0.7 (L) 0.8 - 1.7     CARDIAC PANEL,(CK, CKMB & TROPONIN)    Collection Time: 10/29/21  5:55 PM   Result Value Ref Range    CK - MB <1.0 <3.6 ng/ml    CK-MB Index  0.0 - 4.0 % CALCULATION NOT PERFORMED WHEN RESULT IS BELOW LINEAR LIMIT     26 - 192 U/L    Troponin-I, QT <0.02 0.0 - 0.045 NG/ML   HCG QL SERUM    Collection Time: 10/29/21  5:55 PM   Result Value Ref Range    HCG, Ql. Negative NEG     MAGNESIUM    Collection Time: 10/29/21  5:55 PM   Result Value Ref Range    Magnesium 2.3 1.6 - 2.6 mg/dL   PROTHROMBIN TIME + INR    Collection Time: 10/29/21  5:55 PM   Result Value Ref Range    Prothrombin time 13.5 11.5 - 15.2 sec    INR 1.0 0.8 - 1.2     HEMOGLOBIN A1C WITH EAG    Collection Time: 10/29/21  5:55 PM   Result Value Ref Range    Hemoglobin A1c 5.6 4.2 - 5.6 %    Est. average glucose 114 mg/dL   EKG, 12 LEAD, INITIAL    Collection Time: 10/29/21  5:56 PM   Result Value Ref Range    Ventricular Rate 67 BPM    Atrial Rate 67 BPM    P-R Interval 158 ms    QRS Duration 76 ms    Q-T Interval 388 ms    QTC Calculation (Bezet) 409 ms    Calculated P Axis 48 degrees    Calculated R Axis 42 degrees    Calculated T Axis 28 degrees    Diagnosis       Poor data quality, interpretation may be adversely affected  Normal sinus rhythm  Normal ECG  When compared with ECG of 11-DEC-2019 21:15,  No significant change was found     GLUCOSE, POC    Collection Time: 10/30/21  1:22 AM   Result Value Ref Range    Glucose (POC) 119 (H) 70 - 110 mg/dL   LIPID PANEL    Collection Time: 10/30/21  4:09 AM   Result Value Ref Range    LIPID PROFILE          Cholesterol, total 237 (H) <200 MG/DL    Triglyceride 109 <150 MG/DL    HDL Cholesterol 49 40 - 60 MG/DL    LDL, calculated 166.2 (H) 0 - 100 MG/DL    VLDL, calculated 21.8 MG/DL    CHOL/HDL Ratio 4.8 0 - 5.0     CBC W/O DIFF    Collection Time: 10/30/21  4:09 AM   Result Value Ref Range    WBC 8.0 4.6 - 13.2 K/uL    RBC 3.85 (L) 4.20 - 5.30 M/uL    HGB 11.2 (L) 12.0 - 16.0 g/dL    HCT 34.8 (L) 35.0 - 45.0 %    MCV 90.4 78.0 - 100.0 FL    MCH 29.1 24.0 - 34.0 PG    MCHC 32.2 31.0 - 37.0 g/dL    RDW 14.8 (H) 11.6 - 14.5 %    PLATELET 279 515 - 163 K/uL    MPV 9.6 9.2 - 38.1 FL   METABOLIC PANEL, BASIC    Collection Time: 10/30/21  4:09 AM   Result Value Ref Range    Sodium 136 136 - 145 mmol/L    Potassium 3.9 3.5 - 5.5 mmol/L    Chloride 107 100 - 111 mmol/L    CO2 25 21 - 32 mmol/L    Anion gap 4 3.0 - 18 mmol/L    Glucose 84 74 - 99 mg/dL    BUN 9 7.0 - 18 MG/DL    Creatinine 0.61 0.6 - 1.3 MG/DL    BUN/Creatinine ratio 15 12 - 20      GFR est AA >60 >60 ml/min/1.73m2    GFR est non-AA >60 >60 ml/min/1.73m2    Calcium 8.4 (L) 8.5 - 10.1 MG/DL   GLUCOSE, POC    Collection Time: 10/30/21  6:41 AM   Result Value Ref Range    Glucose (POC) 105 70 - 110 mg/dL   ECHO ADULT COMPLETE    Collection Time: 10/30/21 11:55 AM   Result Value Ref Range    IVSd 1.26 (A) 0.60 - 0.90 cm    LVIDd 4.03 3.90 - 5.30 cm    LVIDs 2.99 cm    LVOT d 2.08 cm    LVPWd 1.11 (A) 0.60 - 0.90 cm    LVOT Peak Gradient 3.37 mmHg    Left Ventricular Outflow Tract Mean Gradient 1.90 mmHg    LVOT SV 67.2 mL    LVOT Peak Velocity 91.80 cm/s    LVOT VTI 19.84 cm    LA Volume 54.32 22.0 - 52.0 mL    LA Area 4C 18.51 cm2    LA Vol 2C 53.05 (A) 22.00 - 52.00 mL    LA Vol 4C 49.89 22.00 - 52.00 mL    MV A Higinio 87.22 cm/s    Mitral Valve E Wave Deceleration Time 251.42 ms    MV E Higinio 84.20 cm/s    Ao Root D 3.00 cm    MV E/A 0.97     LV Mass .2 67.0 - 162.0 g    LV Mass AL Index 87.8 43.0 - 95.0 g/m2    LA Vol Index 29.05 16.00 - 28.00 ml/m2    LA Vol Index 28.37 16.00 - 28.00 ml/m2    LA Vol Index 26.68 16.00 - 28.00 ml/m2   GLUCOSE, POC    Collection Time: 10/30/21 12:25 PM   Result Value Ref Range    Glucose (POC) 100 70 - 110 mg/dL   DUPLEX CAROTID BILATERAL    Collection Time: 10/30/21 12:30 PM   Result Value Ref Range    Left subclavian prox PSV 65.0 cm/s    Left subclavian prox EDV 0.0 cm/s    Right subclavian prox PSV 53.1 cm/s    Right subclavian prox EDV 0.0 cm/s    Right CCA prox sys 89.9 cm/s    Right CCA prox rainey 32.5 cm/s    Right cca dist sys 80.7 cm/s    Right CCA dist rainey 35.1 cm/s    Right ICA prox sys 40.9 cm/s    Right ICA prox rainey 20.0 cm/s    Right ICA mid sys 86.9 cm/s    Right ICA mid rainey 30.2 cm/s    Right ICA dist sys 106.9 cm/s    Right ICA dist rainey 44.3 cm/s    Right eca sys 53.6 cm/s    Right vertebral sys 36.5 cm/s    Right ICA/CCA sys 1.30     Left CCA prox sys 70.0 cm/s    Left CCA prox rainey 24.4 cm/s    Left CCA dist sys 59.3 cm/s    Left CCA dist rainey 23.7 cm/s    Left ICA prox sys 78.7 cm/s    Left ICA prox rainey 27.1 cm/s    Left ICA mid sys 58.8 cm/s    Left ICA mid rainey 24.0 cm/s    Left ICA dist sys 105.5 cm/s    Left ICA dist rainey 48.1 cm/s    Left ECA sys 43.8 cm/s    Left vertebral sys 73.8 cm/s    Left ICA/CCA sys 1.80     LEFT COMMON CAROTID ARTERY MID S 69.3 cm/s    LEFT COMMON CAROTID ARTERY MID D 18.7 cm/s    RIGHT COMMON CAROTID ARTERY MID S 86.0 cm/s    RIGHT COMMON CAROTID ARTERY MID D 33.8 cm/s    RIGHT VERTEBRAL ARTERY D 18.0 cm/s    RIGHT EXTERNAL CAROTID ARTERY D 21.6 cm/s    LEFT VERTEBRAL ARTERY D 34.8 cm/s    LEFT EXTERNAL CAROTID ARTERY D 13.4 cm/s     Additional Data Reviewed:     Condition on discharge:stable   Disposition:    [x]Home   []Home with Home Health   []SNF/NH   []Rehab   []Home with family   []Alternate Facility:____________________      Discharge Medications:     Current Discharge Medication List      START taking these medications    Details   amLODIPine (NORVASC) 10 mg tablet Take 1 Tablet by mouth daily for 30 days. Qty: 30 Tablet, Refills: 0      atorvastatin (LIPITOR) 80 mg tablet Take 1 Tablet by mouth nightly for 30 days. Qty: 30 Tablet, Refills: 0      clopidogreL (PLAVIX) 75 mg tab Take 1 Tablet by mouth daily for 20 doses. Qty: 20 Tablet, Refills: 0      !! aspirin 81 mg chewable tablet Take 1 Tablet by mouth daily for 20 days. Qty: 20 Tablet, Refills: 0      !! aspirin 81 mg chewable tablet Take 2 Tablets by mouth daily for 30 days.  Take this dose of aspirin after you finish taking he 20 day dose of aspirin 81 mg and plavix 75 mg daily  Qty: 60 Tablet, Refills: 0       !! - Potential duplicate medications found. Please discuss with provider. CONTINUE these medications which have CHANGED    Details   hydroCHLOROthiazide (MICROZIDE) 12.5 mg capsule Take 1 Capsule by mouth daily for 30 days. Qty: 30 Capsule, Refills: 0         STOP taking these medications       ondansetron hcl (ZOFRAN) 4 mg tablet Comments:   Reason for Stopping:         ondansetron (ZOFRAN ODT) 4 mg disintegrating tablet Comments:   Reason for Stopping:         promethazine (PHENERGAN) 50 mg suppository Comments:   Reason for Stopping:         pitavastatin calcium (LIVALO) 4 mg tab tablet Comments:   Reason for Stopping:         pitavastatin calcium (LIVALO) 4 mg tab tablet Comments:   Reason for Stopping:         tranexamic acid (LYSTEDA) 650 mg tab tablet Comments:   Reason for Stopping: Follow-up Appointments:   1.  Your PCP: None, within 7-10days      >30 minutes spent coordinating this discharge (review instructions/follow-up, prescriptions, preparing report for sign off)    Signed:  Jade Castro MD  10/30/2021  1:48 PM

## 2021-10-30 NOTE — PROGRESS NOTES
Faxed six scripts to [facility] in [place] to be filled under [facility]. Emailed CM assistants to request they set pt up with a PCP on Monday and call her with the appointment information. Patient drove herself here and will drive herself home. Discharge order noted for today. Orders reviewed. No needs identified at this time.  remains available if needed.   Wolfgang Rodriguez RN - Outcomes Manager  [phone]

## 2021-10-30 NOTE — ROUTINE PROCESS
Stroke Education provided to patient and the following topics were discussed    1. Patients personal risk factors for stroke are hypertension and obesity    2. Warning signs of Stroke:        * Sudden numbness or weakness of the face, arm or leg, especially on one side of          The body            * Sudden confusion, trouble speaking or understanding        * Sudden trouble seeing in one or both eyes        * Sudden trouble walking, dizziness, loss of balance or coordination        * Sudden severe headache with no known cause      3. Importance of activation Emergency Medical Services ( 9-1-1 ) immediately if experience any warning signs of stroke. 4. Be sure and schedule a follow-up appointment with your primary care doctor or any specialists as instructed. 5. You must take medicine every day to treat your risk factors for stroke. Be sure to take your medicines exactly as your doctor tells you: no more, no less. Know what your medicines are for , what they do. Anti-thrombotics /anticoagulants can help prevent strokes. You are taking the following medicine(s)  plavix aspirin     6. Smoking and second-hand smoke greatly increase your risk of stroke, cardiovascular disease and death. Smoking history cigarettes, a few per day    7. Information provided was [facility] Stroke Education Binder    8. Documentation of teaching completed in Patient Education Activity and on Care Plan with teaching response noted?   yes

## 2021-10-30 NOTE — H&P
History and Physical          Subjective     HPI: Sidney Leroy is a 42 y.o. female with a PMHx of HTN who presented to the ED with c/o lightheadedness and left sided facial droop. The lightheadedness has been intermittent all day, and she has had two episode of left sided facial droop. The first was around 0900 and lasted 10 minutes, the second around 1530 and lasted 5 minutes. Both were associated with brief headaches. She also reports intermittent right arm tingling for \"a while\". She states that she stopped taking her BP meds years ago due to losing her insurance. In the ED her BP is elevated at 177/111, but she states it was >180 at home when she checked it. Neurology was consulted. Patient will be admitted for further evaluation and treatment. PMHx:  Past Medical History:   Diagnosis Date    Breast cancer screening by mammogram 2019    Breast biopsy benign. May resume routine annual screenings.  Hypertension        PSurgHx:  Past Surgical History:   Procedure Laterality Date    HX BREAST BIOPSY  2006    Right-benign    HX OOPHORECTOMY      HX OTHER SURGICAL  2013    left neck cyst removal    HX OTHER SURGICAL Right 2017    Pelvic surgery possibly ovary       SocialHx:  Social History     Socioeconomic History    Marital status: SINGLE     Spouse name: Not on file    Number of children: 1    Years of education: 6    Highest education level: 11th grade   Occupational History    Occupation: care giver counselor   Tobacco Use    Smoking status: Former Smoker     Packs/day: 0.50     Years: 10.00     Pack years: 5.00     Quit date: 7/3/2017     Years since quittin.3    Smokeless tobacco: Never Used   Substance and Sexual Activity    Alcohol use:  Yes     Alcohol/week: 6.0 standard drinks     Types: 6 Glasses of wine per week    Drug use: Yes     Types: Marijuana    Sexual activity: Yes     Partners: Male     Birth control/protection: Condom   Other Topics Concern     Service No    Blood Transfusions No    Caffeine Concern No    Occupational Exposure No    Hobby Hazards No    Sleep Concern No    Stress Concern Yes    Weight Concern No    Special Diet No    Back Care No    Exercise Yes     Comment: walks    Bike Helmet No    Seat Belt Yes    Self-Exams No   Social History Narrative    Not on file     Social Determinants of Health     Financial Resource Strain:     Difficulty of Paying Living Expenses:    Food Insecurity:     Worried About Running Out of Food in the Last Year:     Ran Out of Food in the Last Year:    Transportation Needs:     Lack of Transportation (Medical):  Lack of Transportation (Non-Medical):    Physical Activity:     Days of Exercise per Week:     Minutes of Exercise per Session:    Stress:     Feeling of Stress :    Social Connections:     Frequency of Communication with Friends and Family:     Frequency of Social Gatherings with Friends and Family:     Attends Yarsani Services:     Active Member of Clubs or Organizations:     Attends Club or Organization Meetings:     Marital Status:    Intimate Partner Violence:     Fear of Current or Ex-Partner:     Emotionally Abused:     Physically Abused:     Sexually Abused:        FamilyHx:  Family History   Problem Relation Age of Onset    Hypertension Mother     Diabetes Mother     Hypertension Father     Heart Disease Father     Stroke Maternal Grandmother     Lung Cancer Maternal Grandmother        Home Medications:  Prior to Admission Medications   Prescriptions Last Dose Informant Patient Reported? Taking?   hydroCHLOROthiazide (MICROZIDE) 12.5 mg capsule   No No   Sig: Take 1 Cap by mouth daily. ondansetron (ZOFRAN ODT) 4 mg disintegrating tablet   No No   Sig: Take 1-2 tablets every 6-8 hours as needed for nausea and vomiting. ondansetron hcl (ZOFRAN) 4 mg tablet   No No   Sig: Take 2 Tabs by mouth every eight (8) hours as needed for Nausea. pitavastatin calcium (LIVALO) 4 mg tab tablet   No No   Sig: Take 1 Tab by mouth daily. pitavastatin calcium (LIVALO) 4 mg tab tablet   No No   Sig: Take 1 Tab by mouth daily. promethazine (PHENERGAN) 50 mg suppository   No No   Sig: Insert 1 Suppository into rectum every six (6) hours as needed for Nausea. tranexamic acid (LYSTEDA) 650 mg tab tablet   Yes No   Sig: Take 1,300 mg by mouth three (3) times daily. Indications: during menses for 5 days      Facility-Administered Medications: None       Allergies:  No Known Allergies     Review of Systems:  CONST: no weight loss, no falls, no fever or chills  HEENT: No change in vision, no earache, no tinnitus, no sore throat or sinus congestion. NECK: No pain or stiffness. PULM: No shortness of breath, no cough or wheeze. CV: no pnd or orthopnea, no CP, no palpitations, no edema  GI: No abdominal pain, no nausea, no vomiting or diarrhea, no melena or bright red blood per rectum. : No urinary frequency, no urgency, no hesitancy or dysuria. MSK: No joint or muscle pain, no back pain, no recent trauma. INTEG: No rash, no itching, no lesions. ENDO No polyuria, no polydipsia, no heat or cold intolerance. HEME: No anemia or easy bruising or bleeding.    NEURO headache, lightheadedness, no seizures, no numbness, tingling, facial droop, no weakness  PSYCH: Anxiety and depression      Objective     Physical Exam:  Visit Vitals  BP (!) 177/111   Pulse 84   Temp 98.4 °F (36.9 °C)   Resp 18   Ht 5' 5\" (1.651 m)   Wt 79.8 kg (176 lb)   SpO2 98%   BMI 29.29 kg/m²       General: NAD, appears stated age, alert  Skin: warm, dry, no rashes  Eyes: PERRL, sclera is non-icteric  HENT: normocephalic/atraumatic, moist mucus membranes  Respiratory: CTA with no signs of respiratory distress  Cardiovascular: RRR, no m/r/g, no perpheral edema  GI: soft, non-tender, normal bowel sounds  Extremities: no cyanosis, no peripheral edema  Neuro: no focal deficits, normal speech, EOMI, equal facial symmetry, strength is 5/5 in upper and lower extremities bilaterally, no drift, normal sensation throughout  Psych: appropriate mood and affect, no visual or auditory hallucinations    Laboratory Studies:  CMP:   Lab Results   Component Value Date/Time     10/29/2021 05:55 PM    K 3.7 10/29/2021 05:55 PM     10/29/2021 05:55 PM    CO2 26 10/29/2021 05:55 PM    AGAP 6 10/29/2021 05:55 PM    GLU 87 10/29/2021 05:55 PM    BUN 10 10/29/2021 05:55 PM    CREA 0.70 10/29/2021 05:55 PM    GFRAA >60 10/29/2021 05:55 PM    GFRNA >60 10/29/2021 05:55 PM    CA 8.4 (L) 10/29/2021 05:55 PM    MG 2.3 10/29/2021 05:55 PM    ALB 3.4 10/29/2021 05:55 PM    TP 8.1 10/29/2021 05:55 PM    GLOB 4.7 (H) 10/29/2021 05:55 PM    AGRAT 0.7 (L) 10/29/2021 05:55 PM    ALT 39 10/29/2021 05:55 PM     CBC:   Lab Results   Component Value Date/Time    WBC 9.9 10/29/2021 05:55 PM    HGB 11.0 (L) 10/29/2021 05:55 PM    HCT 33.9 (L) 10/29/2021 05:55 PM     10/29/2021 05:55 PM       Imaging Reviewed:  CT HEAD WO CONT    Result Date: 10/29/2021  CT head without contrast History: Headaches and transient left facial droop. All CT scans at this facility are performed using dose optimization technique as appropriate to a performed exam, to include automated exposure control, adjustment of the mA and/or kV according to patient size (including appropriate matching for site specific examination) or use of iterative reconstruction technique. No midline shift or extra-axial collection. Brain parenchyma is unremarkable. No intracranial hemorrhage, mass lesions or cortical infarct involving a major vessel. Visualized paranasal sinuses and mastoid air cells are clear. Negative noncontrast head CT. XR CHEST PORT    Result Date: 10/29/2021  EXAMINATION: Chest single view INDICATION: Dizziness, hypertension COMPARISON: 2/19/2018 FINDINGS: Single frontal view the chest obtained.  Mediastinal silhouette and pulmonary vasculature unremarkable. No confluent consolidation. No evidence of pneumothorax. No acute osseous findings. No acute findings. EKG:   Normal sinus rhythm       Assessment/Plan     Principal Problem:    TIA (transient ischemic attack) (10/29/2021)    Active Problems:    HTN (hypertension) (10/29/2021)      - MRI brain  - MRA head/neck  - echo  - cardiac monitoring  - ASA/statin  - plavix  - neurology consulted - appreciate assistance  - ST/PT/OT  - lipid panel, a1c  - permissive HTN <220/110; PRN labetalol   - DVT protocol: lovenox    I have personally reviewed all pertinent labs, films and EKGs that have officially resulted. I reviewed available electronic documentation outlining the initial presentation as well as the emergency room physician's encounter.     GORDY Holliday DR[facility]  Hospitalist Division  Office:  [phone]  Pager: [phone]

## 2021-10-31 LAB
ATRIAL RATE: 67 BPM
CALCULATED P AXIS, ECG09: 48 DEGREES
CALCULATED R AXIS, ECG10: 42 DEGREES
CALCULATED T AXIS, ECG11: 28 DEGREES
DIAGNOSIS, 93000: NORMAL
ECHO AO ROOT DIAM: 3 CM
ECHO LA AREA 4C: 18.51 CM2
ECHO LA VOL 2C: 53.05 ML (ref 22–52)
ECHO LA VOL 4C: 49.89 ML (ref 22–52)
ECHO LA VOL BP: 54.32 ML (ref 22–52)
ECHO LA VOL/BSA BIPLANE: 29.05 ML/M2 (ref 16–28)
ECHO LA VOLUME INDEX A2C: 28.37 ML/M2 (ref 16–28)
ECHO LA VOLUME INDEX A4C: 26.68 ML/M2 (ref 16–28)
ECHO LV INTERNAL DIMENSION DIASTOLIC: 4.03 CM (ref 3.9–5.3)
ECHO LV INTERNAL DIMENSION SYSTOLIC: 2.99 CM
ECHO LV IVSD: 1.26 CM (ref 0.6–0.9)
ECHO LV MASS 2D: 164.2 G (ref 67–162)
ECHO LV MASS INDEX 2D: 87.8 G/M2 (ref 43–95)
ECHO LV POSTERIOR WALL DIASTOLIC: 1.11 CM (ref 0.6–0.9)
ECHO LVOT DIAM: 2.08 CM
ECHO LVOT PEAK GRADIENT: 3.37 MMHG
ECHO LVOT PEAK VELOCITY: 91.8 CM/S
ECHO LVOT SV: 67.2 ML
ECHO LVOT VTI: 19.84 CM
ECHO MV A VELOCITY: 87.22 CM/S
ECHO MV E DECELERATION TIME (DT): 251.42 MS
ECHO MV E VELOCITY: 84.2 CM/S
ECHO MV E/A RATIO: 0.97
LVOT MG: 1.9 MMHG
P-R INTERVAL, ECG05: 158 MS
Q-T INTERVAL, ECG07: 388 MS
QRS DURATION, ECG06: 76 MS
QTC CALCULATION (BEZET), ECG08: 409 MS
VENTRICULAR RATE, ECG03: 67 BPM

## 2021-11-01 LAB
LEFT CCA DIST DIAS: 23.7 CM/S
LEFT CCA DIST SYS: 59.3 CM/S
LEFT CCA MID DIAS: 18.72 CM/S
LEFT CCA MID SYS: 69.25 CM/S
LEFT CCA PROX DIAS: 24.4 CM/S
LEFT CCA PROX SYS: 70 CM/S
LEFT ECA DIAS: 13.38 CM/S
LEFT ECA SYS: 43.8 CM/S
LEFT ICA DIST DIAS: 48.1 CM/S
LEFT ICA DIST SYS: 105.5 CM/S
LEFT ICA MID DIAS: 24 CM/S
LEFT ICA MID SYS: 58.8 CM/S
LEFT ICA PROX DIAS: 27.1 CM/S
LEFT ICA PROX SYS: 78.7 CM/S
LEFT ICA/CCA SYS: 1.78
LEFT VERTEBRAL DIAS: 34.83 CM/S
LEFT VERTEBRAL SYS: 73.8 CM/S
RIGHT CCA DIST DIAS: 35.1 CM/S
RIGHT CCA DIST SYS: 80.7 CM/S
RIGHT CCA MID DIAS: 33.78 CM/S
RIGHT CCA MID SYS: 85.96 CM/S
RIGHT CCA PROX DIAS: 32.5 CM/S
RIGHT CCA PROX SYS: 89.9 CM/S
RIGHT ECA DIAS: 21.57 CM/S
RIGHT ECA SYS: 53.6 CM/S
RIGHT ICA DIST DIAS: 44.3 CM/S
RIGHT ICA DIST SYS: 106.9 CM/S
RIGHT ICA MID DIAS: 30.2 CM/S
RIGHT ICA MID SYS: 86.9 CM/S
RIGHT ICA PROX DIAS: 20 CM/S
RIGHT ICA PROX SYS: 40.9 CM/S
RIGHT ICA/CCA SYS: 1.3
RIGHT VERTEBRAL DIAS: 18.01 CM/S
RIGHT VERTEBRAL SYS: 36.5 CM/S
VAS LEFT SUBCLAVIAN PROX EDV: 0 CM/S
VAS LEFT SUBCLAVIAN PROX PSV: 65 CM/S
VAS RIGHT SUBCLAVIAN PROX EDV: 0 CM/S
VAS RIGHT SUBCLAVIAN PROX PSV: 53.1 CM/S

## 2021-11-01 NOTE — PROGRESS NOTES
Patient has transitional care follow up with ISAAK Carlson on 11/15/2021 at 1:00 pm, patient should arrive at 12:30 pm.  Call made to patient, no answer, left voice mail to return my call.

## 2021-11-02 NOTE — PROGRESS NOTES
Patient returned call, patient made aware of transitional care follow up with ISAAK Powell on 11/15/2021 arrive at 12:30 pm for 1:00 pm appointment.

## 2021-11-03 NOTE — ED NOTES
Patient transported via stretcher to CT department in stable condition Preventive Care 65 Years and Older, Female  Preventive care refers to lifestyle choices and visits with your health care provider that can promote health and wellness. This includes:  · A yearly physical exam. This is also called an annual well check.  · Regular dental and eye exams.  · Immunizations.  · Screening for certain conditions.  · Healthy lifestyle choices, such as diet and exercise.  What can I expect for my preventive care visit?  Physical exam  Your health care provider will check:  · Height and weight. These may be used to calculate body mass index (BMI), which is a measurement that tells if you are at a healthy weight.  · Heart rate and blood pressure.  · Your skin for abnormal spots.  Counseling  Your health care provider may ask you questions about:  · Alcohol, tobacco, and drug use.  · Emotional well-being.  · Home and relationship well-being.  · Sexual activity.  · Eating habits.  · History of falls.  · Memory and ability to understand (cognition).  · Work and work environment.  · Pregnancy and menstrual history.  What immunizations do I need?    Influenza (flu) vaccine  · This is recommended every year.  Tetanus, diphtheria, and pertussis (Tdap) vaccine  · You may need a Td booster every 10 years.  Varicella (chickenpox) vaccine  · You may need this vaccine if you have not already been vaccinated.  Zoster (shingles) vaccine  · You may need this after age 60.  Pneumococcal conjugate (PCV13) vaccine  · One dose is recommended after age 65.  Pneumococcal polysaccharide (PPSV23) vaccine  · One dose is recommended after age 65.  Measles, mumps, and rubella (MMR) vaccine  · You may need at least one dose of MMR if you were born in 1957 or later. You may also need a second dose.  Meningococcal conjugate (MenACWY) vaccine  · You may need this if you have certain conditions.  Hepatitis A vaccine  · You may need this if you have certain conditions or if you travel or work in places where you may be exposed  to hepatitis A.  Hepatitis B vaccine  · You may need this if you have certain conditions or if you travel or work in places where you may be exposed to hepatitis B.  Haemophilus influenzae type b (Hib) vaccine  · You may need this if you have certain conditions.  You may receive vaccines as individual doses or as more than one vaccine together in one shot (combination vaccines). Talk with your health care provider about the risks and benefits of combination vaccines.  What tests do I need?  Blood tests  · Lipid and cholesterol levels. These may be checked every 5 years, or more frequently depending on your overall health.  · Hepatitis C test.  · Hepatitis B test.  Screening  · Lung cancer screening. You may have this screening every year starting at age 55 if you have a 30-pack-year history of smoking and currently smoke or have quit within the past 15 years.  · Colorectal cancer screening. All adults should have this screening starting at age 50 and continuing until age 75. Your health care provider may recommend screening at age 45 if you are at increased risk. You will have tests every 1-10 years, depending on your results and the type of screening test.  · Diabetes screening. This is done by checking your blood sugar (glucose) after you have not eaten for a while (fasting). You may have this done every 1-3 years.  · Mammogram. This may be done every 1-2 years. Talk with your health care provider about how often you should have regular mammograms.  · BRCA-related cancer screening. This may be done if you have a family history of breast, ovarian, tubal, or peritoneal cancers.  Other tests  · Sexually transmitted disease (STD) testing.  · Bone density scan. This is done to screen for osteoporosis. You may have this done starting at age 65.  Follow these instructions at home:  Eating and drinking  · Eat a diet that includes fresh fruits and vegetables, whole grains, lean protein, and low-fat dairy products. Limit  your intake of foods with high amounts of sugar, saturated fats, and salt.  · Take vitamin and mineral supplements as recommended by your health care provider.  · Do not drink alcohol if your health care provider tells you not to drink.  · If you drink alcohol:  ? Limit how much you have to 0-1 drink a day.  ? Be aware of how much alcohol is in your drink. In the [place] one drink equals one 12 oz bottle of beer (355 mL), one 5 oz glass of wine (148 mL), or one 1½ oz glass of hard liquor (44 mL).  Lifestyle  · Take daily care of your teeth and gums.  · Stay active. Exercise for at least 30 minutes on 5 or more days each week.  · Do not use any products that contain nicotine or tobacco, such as cigarettes, e-cigarettes, and chewing tobacco. If you need help quitting, ask your health care provider.  · If you are sexually active, practice safe sex. Use a condom or other form of protection in order to prevent STIs (sexually transmitted infections).  · Talk with your health care provider about taking a low-dose aspirin or statin.  What's next?  · Go to your health care provider once a year for a well check visit.  · Ask your health care provider how often you should have your eyes and teeth checked.  · Stay up to date on all vaccines.  This information is not intended to replace advice given to you by your health care provider. Make sure you discuss any questions you have with your health care provider.  Document Released: 01/13/2017 Document Revised: 12/12/2019 Document Reviewed: 12/12/2019  [employer] Patient Education © 2020 [employer]

## 2021-11-15 ENCOUNTER — OFFICE VISIT (OUTPATIENT)
Dept: FAMILY MEDICINE CLINIC | Age: 46
End: 2021-11-15

## 2021-11-15 VITALS
HEART RATE: 77 BPM | HEIGHT: 65 IN | OXYGEN SATURATION: 99 % | DIASTOLIC BLOOD PRESSURE: 72 MMHG | TEMPERATURE: 98.7 F | RESPIRATION RATE: 20 BRPM | WEIGHT: 179.6 LBS | BODY MASS INDEX: 29.92 KG/M2 | SYSTOLIC BLOOD PRESSURE: 119 MMHG

## 2021-11-15 DIAGNOSIS — Z76.89 ENCOUNTER TO ESTABLISH CARE: ICD-10-CM

## 2021-11-15 DIAGNOSIS — I10 PRIMARY HYPERTENSION: ICD-10-CM

## 2021-11-15 DIAGNOSIS — G45.9 TIA (TRANSIENT ISCHEMIC ATTACK): Primary | ICD-10-CM

## 2021-11-15 PROCEDURE — 99204 OFFICE O/P NEW MOD 45 MIN: CPT | Performed by: NURSE PRACTITIONER

## 2021-11-15 NOTE — PROGRESS NOTES
Transitional Care Management Progress Note    Patient: Niko Adhikari  : 1975  PCP: Ivette Harrell NP    Date of admission: 10/29/2021  Date of discharge: 10/30/2021    Patient was contacted by Transitional Care Management services within two days after her discharge: No. This encounter and supporting documentation was reviewed if available. Medication reconciliation was performed today (11/15/2021). Assessment/Plan:     1. TIA (transient ischemic attack)  Assessment & Plan:  Continue regimen, consult neurology for management  Discussed red flag symptoms that rqeuire ER  Orders:  -     LIPID PANEL; Future  -     METABOLIC PANEL, COMPREHENSIVE; Future  -     REFERRAL TO NEUROLOGY  2. Primary hypertension  Assessment & Plan:  BP at goal, <130/80, continue regimen  Orders:  -     METABOLIC PANEL, COMPREHENSIVE; Future  3. Encounter to establish care    Follow-up and Dispositions    · Return in about 3 months (around 2/15/2022) for blood pressure, lab results. Subjective:   Niko Adhikari is a 42 y.o. female presenting today for follow-up after being discharged from [facility]. The discharge summary was reviewed or requested. The main problem requiring admission was TIA. Complications during admission: see dx list    Hospital Course Per Discharge Summary:  Is a 44-year-old female with history of hypertension noncompliant due to not having insurance for medications who was admitted after she presented with complaints of feeling lightheaded intermittently and 2 episodes of left-sided facial droop. Also had complaints of intermittent right arm tingling. In the emergency room her blood pressure was elevated systolic and 10X diastolic 059 and reported that her systolic blood pressures at home had been greater than 180.     Patient was admitted and had several green imaging studies including CT and MRI.   Her brain vasculature was evaluated with MRA and she had carotid duplex for her neck vasculature. All the studies were negative. Cardiac echo did not show any shunting and showed normal ejection fraction. No reports of cardiac arrhythmia during her stay here. Her presentation is likely due to TIA versus hypertensive emergency. She has been discharged on DAPT for 21 days and then aspirin indefinitely as well as high-dose statin and blood pressure medications. He has been counseled on lifestyle modifications. She is getting assistance with pain for her medications through the hospital program for the next 1 month and she is aware. She has been instructed to find a primary care physician and find ways to continue taking her medications to avoid future events. She has expressed understanding.     Date of discharge she reported feeling better. She reported that she has been ambulating in her room without significant events despite her reporting continued feeling of lightheadedness. She has been instructed on safe ways to mobilize in the setting of feeling lightheaded to avoid syncope. She is expressed understanding. She has been given instructions on signs and symptoms to return to the emergency room with or call her primary care physician when she finds 1. She has been given blood pressure parameters as well. Overall she has been discharged in stable condition. Interval history/Current status: Patient presents today for hospital follow-up. Patient states she is feeling much better but 3 days ago, she developed another episode of left-sided facial numbness and felt like it was \"trying to droop. \"  This lasted for about 3 minutes. Last week she states that she had blurry vision of left eye with associated dizziness. She has not seen an eye doctor for years.     Admitting symptoms have: improved      New Medications at Discharge:  Amlodipine 10 mg  Atorvastatin 80  Mg  Clopidogrel 75 mg x 20 days  ASA 81 mg x20 days   mg 20 days after Plavix and ASA    Changed Medications at Discharge: HCTZ 12.5 mg     Discontinued Medications at Discharge:  Zofran  Promethazine   Pitavastatin     Medications marked \"taking\" at this time:  Home Medications    Medication Sig Start Date End Date Taking? Authorizing Provider   amLODIPine (NORVASC) 10 mg tablet Take 1 Tablet by mouth daily for 30 days. 10/30/21 11/29/21 Yes Megan Cueto MD   atorvastatin (LIPITOR) 80 mg tablet Take 1 Tablet by mouth nightly for 30 days. 10/30/21 11/29/21 Yes Megan Cueto MD   clopidogreL (PLAVIX) 75 mg tab Take 1 Tablet by mouth daily for 20 doses. 10/31/21 11/20/21 Yes Megan Cueto MD   aspirin 81 mg chewable tablet Take 1 Tablet by mouth daily for 20 days. 10/31/21 11/20/21 Yes Megan Cueto MD   hydroCHLOROthiazide (MICROZIDE) 12.5 mg capsule Take 1 Capsule by mouth daily for 30 days. 10/30/21 11/29/21 Yes Megan Cueto MD   aspirin 81 mg chewable tablet Take 2 Tablets by mouth daily for 30 days. Take this dose of aspirin after you finish taking he 20 day dose of aspirin 81 mg and plavix 75 mg daily 10/30/21 11/15/21  Megan Cueto MD          Patient Active Problem List   Diagnosis Code    TIA (transient ischemic attack) G45.9    HTN (hypertension) I10     Current Outpatient Medications   Medication Sig Dispense Refill    amLODIPine (NORVASC) 10 mg tablet Take 1 Tablet by mouth daily for 30 days. 30 Tablet 0    atorvastatin (LIPITOR) 80 mg tablet Take 1 Tablet by mouth nightly for 30 days. 30 Tablet 0    clopidogreL (PLAVIX) 75 mg tab Take 1 Tablet by mouth daily for 20 doses. 20 Tablet 0    aspirin 81 mg chewable tablet Take 1 Tablet by mouth daily for 20 days. 20 Tablet 0    hydroCHLOROthiazide (MICROZIDE) 12.5 mg capsule Take 1 Capsule by mouth daily for 30 days. 30 Capsule 0     No Known Allergies   Past Medical History:   Diagnosis Date    Breast cancer screening by mammogram 08/16/2019    Breast biopsy benign. May resume routine annual screenings.     Hypercholesterolemia     Hypertension     TIA (transient ischemic attack)       Social History     Socioeconomic History    Marital status: SINGLE     Spouse name: Not on file    Number of children: 3    Years of education: 6    Highest education level: 11th grade   Occupational History    Occupation: care giver counselor   Tobacco Use    Smoking status: Former Smoker     Packs/day: 0.50     Years: 10.00     Pack years: 5.00     Quit date: 7/3/2017     Years since quittin.3    Smokeless tobacco: Never Used   Vaping Use    Vaping Use: Never used   Substance and Sexual Activity    Alcohol use: Yes     Alcohol/week: 6.0 standard drinks     Types: 6 Glasses of wine per week     Comment: light    Drug use: Yes     Types: Marijuana    Sexual activity: Yes     Partners: Male     Birth control/protection: Condom   Other Topics Concern     Service No    Blood Transfusions No    Caffeine Concern No    Occupational Exposure No    Hobby Hazards No    Sleep Concern No    Stress Concern Yes    Weight Concern No    Special Diet No    Back Care No    Exercise Yes     Comment: walks    Bike Helmet No    Seat Belt Yes    Self-Exams No   Social History Narrative    Not on file     Social Determinants of Health     Financial Resource Strain:     Difficulty of Paying Living Expenses: Not on file   Food Insecurity:     Worried About Running Out of Food in the Last Year: Not on file    Ashley of Food in the Last Year: Not on file   Transportation Needs:     Lack of Transportation (Medical): Not on file    Lack of Transportation (Non-Medical):  Not on file   Physical Activity:     Days of Exercise per Week: Not on file    Minutes of Exercise per Session: Not on file   Stress:     Feeling of Stress : Not on file   Social Connections:     Frequency of Communication with Friends and Family: Not on file    Frequency of Social Gatherings with Friends and Family: Not on file    Attends Jehovah's witness Services: Not on file    Active Member of Clubs or Organizations: Not on file    Attends Club or Organization Meetings: Not on file    Marital Status: Not on file   Intimate Partner Violence:     Fear of Current or Ex-Partner: Not on file    Emotionally Abused: Not on file    Physically Abused: Not on file    Sexually Abused: Not on file   Housing Stability:     Unable to Pay for Housing in the Last Year: Not on file    Number of [place] in the Last Year: Not on file    Unstable Housing in the Last Year: Not on file      Past Surgical History:   Procedure Laterality Date    HX BREAST BIOPSY  6/2006    Right-benign    HX OOPHORECTOMY      HX OTHER SURGICAL  1/2013    left neck cyst removal    HX OTHER SURGICAL Right 03/2017    Pelvic surgery possibly ovary      Family History   Problem Relation Age of Onset    Hypertension Mother     Diabetes Mother     Hypertension Father     Heart Disease Father     Stroke Maternal Grandmother     Lung Cancer Maternal Grandmother         Review of Systems   Constitutional: Negative for chills, fever and malaise/fatigue. Eyes: Positive for blurred vision. Respiratory: Negative for shortness of breath. Cardiovascular: Negative for chest pain. Gastrointestinal: Negative for nausea and vomiting. Neurological: Positive for dizziness and tingling. Negative for headaches. Objective:   /72 (BP 1 Location: Left upper arm, BP Patient Position: Sitting, BP Cuff Size: Large adult)   Pulse 77   Temp 98.7 °F (37.1 °C) (Oral)   Resp 20   Ht 5' 5\" (1.651 m)   Wt 179 lb 9.6 oz (81.5 kg)   LMP 10/28/2021 (Approximate)   SpO2 99%   BMI 29.89 kg/m²      Physical Exam  Vitals and nursing note reviewed. Constitutional:       General: She is not in acute distress. Appearance: She is not toxic-appearing. HENT:      Head: Normocephalic and atraumatic. Cardiovascular:      Rate and Rhythm: Normal rate and regular rhythm.       Heart sounds: No murmur heard. No friction rub. No gallop. Pulmonary:      Effort: Pulmonary effort is normal. No respiratory distress. Breath sounds: No stridor. No wheezing, rhonchi or rales. Musculoskeletal:         General: Normal range of motion. Cervical back: Normal range of motion. Neurological:      General: No focal deficit present. Mental Status: She is alert and oriented to person, place, and time. Psychiatric:         Mood and Affect: Mood normal.         Thought Content:  Thought content normal.         Judgment: Judgment normal.        Dandy Hawkins, ISAAK

## 2021-11-15 NOTE — PATIENT INSTRUCTIONS
Transient Ischemic Attack: Care Instructions  Overview     A transient ischemic attack (TIA) is when blood flow to a part of your brain is blocked for a short time. A TIA is like a stroke but usually lasts only a few minutes. A TIA does not cause lasting brain damage. Any vision problems, slurred speech, or other symptoms usually go away in 10 to 20 minutes. But they may last for up to 24 hours. TIAs are often warning signs of a stroke. Some people who have a TIA may have a stroke in the future. A stroke can cause symptoms like those of a TIA. But a stroke causes lasting damage to your brain. You can take steps to help prevent a stroke. One thing you can do is get early treatment. If you have other new symptoms, or if your symptoms do not get better, go back to the emergency room or call your doctor right away. Getting treatment right away may prevent long-term brain damage caused by a stroke. Follow-up care is a key part of your treatment and safety. Be sure to make and go to all appointments, and call your doctor if you are having problems. It's also a good idea to know your test results and keep a list of the medicines you take. How can you care for yourself at home? Medicines    · Be safe with medicines. Take your medicines exactly as prescribed. Call your doctor if you think you are having a problem with your medicine.     · If you take a blood thinner, such as aspirin, be sure you get instructions about how to take your medicine safely. Blood thinners can cause serious bleeding problems.     · Call your doctor if you are not able to take your medicines for any reason.     · Do not take any over-the-counter medicines or herbal products without talking to your doctor first.     · If you take birth control pills or hormone therapy, talk to your doctor. Ask if these treatments are right for you. Lifestyle changes    · Do not smoke.  If you need help quitting, talk to your doctor about stop-smoking programs and medicines.     · Be active. If your doctor recommends it, get more exercise. Walking is a good choice. Bit by bit, increase the amount you walk every day. Try for at least 30 minutes on most days of the week. You also may want to swim, bike, or do other activities.     · Eat heart-healthy foods. These include fruits, vegetables, high-fiber foods, lean meats, beans, peas, nuts, seeds, and soy products, and foods that are low in sodium, saturated fat, and trans fat.     · Stay at a healthy weight. Lose weight if you need to.     · Limit alcohol to 2 drinks a day for men and 1 drink a day for women. Staying healthy    · Manage other health problems such as diabetes, high blood pressure, and high cholesterol.     · Get the flu vaccine every year. When should you call for help? Call 911 anytime you think you may need emergency care. For example, call if:    · You have new or worse symptoms of a stroke. These may include:  ? Sudden numbness, tingling, weakness, or loss of movement in your face, arm, or leg, especially on only one side of your body. ? Sudden vision changes. ? Sudden trouble speaking. ? Sudden confusion or trouble understanding simple statements. ? Sudden problems with walking or balance. ? A sudden, severe headache that is different from past headaches. Call 911 even if these symptoms go away in a few minutes.     · You feel like you are having another TIA. Watch closely for changes in your health, and be sure to contact your doctor if you have any problems. Where can you learn more? Go to http://www.[employer].com/  Enter I231 in the search box to learn more about \"Transient Ischemic Attack: Care Instructions. \"  Current as of: July 6, 2021               Content Version: 13.0  © 4372-0259 [employer]. Care instructions adapted under license by "[employer] (which disclaims liability or warranty for this information).  If you have questions about a medical condition or this instruction, always ask your healthcare professional. [place] [address] any warranty or liability for your use of this information.

## 2021-11-15 NOTE — PROGRESS NOTES
Ingriddanish Wills presents today for   Chief Complaint   Patient presents with   [facility] Follow Up     admitted to  on 10/29/21 for TIA, discharged 10/31/21       Aakash Wills preferred language for health care discussion is english/other. Is someone accompanying this pt? no    Is the patient using any DME equipment during [address]? no    Depression Screening:  3 most recent PHQ Screens 11/15/2021   Little interest or pleasure in doing things Not at all   Feeling down, depressed, irritable, or hopeless Not at all   Total Score PHQ 2 0       Learning Assessment:  Learning Assessment 11/15/2021   PRIMARY LEARNER Patient   HIGHEST LEVEL OF EDUCATION - PRIMARY LEARNER  GRADUATED [address] [facility] CAREGIVER No   PRIMARY LANGUAGE ENGLISH    NEED No   LEARNER PREFERENCE PRIMARY DEMONSTRATION   ANSWERED BY patient   RELATIONSHIP SELF       Abuse Screening:  Abuse Screening Questionnaire 11/15/2021   Do you ever feel afraid of your partner? N   Are you in a relationship with someone who physically or mentally threatens you? N   Is it safe for you to go home? Y       Generalized Anxiety  No flowsheet data found. Health Maintenance Due   Topic Date Due    Cervical cancer screen  Never done   . Health Maintenance reviewed and discussed and ordered per Provider. Advance Directive:  1. Do you have an advance directive in place?  Patient Reply:no

## 2021-11-29 RX ORDER — HYDROCHLOROTHIAZIDE 12.5 MG/1
12.5 CAPSULE ORAL DAILY
Qty: 30 CAPSULE | Refills: 0 | Status: SHIPPED | OUTPATIENT
Start: 2021-11-29 | End: 2021-12-29

## 2021-11-29 RX ORDER — AMLODIPINE BESYLATE 10 MG/1
10 TABLET ORAL DAILY
Qty: 30 TABLET | Refills: 0 | Status: SHIPPED | OUTPATIENT
Start: 2021-11-29 | End: 2021-12-29

## 2021-11-29 RX ORDER — ATORVASTATIN CALCIUM 80 MG/1
80 TABLET, FILM COATED ORAL
Qty: 30 TABLET | Refills: 0 | Status: SHIPPED | OUTPATIENT
Start: 2021-11-29 | End: 2021-12-29

## 2021-11-29 NOTE — TELEPHONE ENCOUNTER
Patient need a medication refill. Please advise     Requested Prescriptions     Pending Prescriptions Disp Refills    hydroCHLOROthiazide (MICROZIDE) 12.5 mg capsule 30 Capsule 0     Sig: Take 1 Capsule by mouth daily for 30 days.  atorvastatin (LIPITOR) 80 mg tablet 30 Tablet 0     Sig: Take 1 Tablet by mouth nightly for 30 days.  amLODIPine (NORVASC) 10 mg tablet 30 Tablet 0     Sig: Take 1 Tablet by mouth daily for 30 days.

## 2021-11-30 ENCOUNTER — OFFICE VISIT (OUTPATIENT)
Dept: NEUROLOGY | Age: 46
End: 2021-11-30

## 2021-11-30 VITALS
DIASTOLIC BLOOD PRESSURE: 66 MMHG | SYSTOLIC BLOOD PRESSURE: 112 MMHG | WEIGHT: 181.8 LBS | HEIGHT: 65 IN | BODY MASS INDEX: 30.29 KG/M2 | HEART RATE: 91 BPM | RESPIRATION RATE: 18 BRPM | OXYGEN SATURATION: 97 %

## 2021-11-30 DIAGNOSIS — R42 EPISODIC LIGHTHEADEDNESS: ICD-10-CM

## 2021-11-30 DIAGNOSIS — G45.9 TIA (TRANSIENT ISCHEMIC ATTACK): ICD-10-CM

## 2021-11-30 DIAGNOSIS — Z79.02 ANTIPLATELET OR ANTITHROMBOTIC LONG-TERM USE: ICD-10-CM

## 2021-11-30 DIAGNOSIS — H53.8 BLURRED VISION, LEFT EYE: Primary | ICD-10-CM

## 2021-11-30 DIAGNOSIS — Z79.899 ON STATIN THERAPY: ICD-10-CM

## 2021-11-30 PROCEDURE — 99214 OFFICE O/P EST MOD 30 MIN: CPT | Performed by: NURSE PRACTITIONER

## 2021-11-30 RX ORDER — ASPIRIN 81 MG/1
162 TABLET ORAL DAILY
COMMUNITY

## 2021-11-30 NOTE — PROGRESS NOTES
[facility]  [address] [facility], [address], [place], [address]  [address] [place], [address]  Office:  [phone]  Fax: [phone]  Chief Complaint   Patient presents with   Wong Sampson TIA   [facility] Follow Up     This is a 72-year-old female who presents for hospital follow-up TIA. Per documentation review significant history of hypertension and not on medication. Presented to [facility] emergency room on 10/29. Had reports of left facial weakness. She was discharged on dual antiplatelet therapy with 81 mg of aspirin and clopidogrel 75 mg for 3 weeks. She now remains on Two 81 mg tablets of aspirin and high-dose statin therapy. Antihypertensive medication was initiated. She is on hydrochlorothiazide 12.5 mg daily. She said since discharge from the hospital she has having intermittent lightheadedness upon position changes. Endorses drinking adequate amounts of fluid. Denies chest pain, shortness of breath, feeling her heart racing or skipping. Endorses intermittent left eye blurred vision. Mentions left-sided facial twisting. She said this has occurred for different incidences on 4 separate days since leaving the hospital. She said symptoms only last for 2 minutes. Denies representing to the emergency room. She also explains an incident of right arm tingling that only lasted 3 minutes. She has not had a formal eye exam. She did see her primary care provider in the interim and tells me she explained the symptoms. Denies any of the symptoms present in office today. Denies speech disturbance or new onset weaknesses. Denies tobacco use. No other concerns at this time. Past Medical History:   Diagnosis Date    Breast cancer screening by mammogram 08/16/2019    Breast biopsy benign. May resume routine annual screenings.     Hypercholesterolemia     Hypertension     TIA (transient ischemic attack)        Past Surgical History:   Procedure Laterality Date    HX BREAST BIOPSY  2006    Right-benign    HX OOPHORECTOMY      HX OTHER SURGICAL  2013    left neck cyst removal    HX OTHER SURGICAL Right 2017    Pelvic surgery possibly ovary       Current Outpatient Medications   Medication Sig Dispense Refill    aspirin delayed-release 81 mg tablet Take 162 mg by mouth daily.  hydroCHLOROthiazide (MICROZIDE) 12.5 mg capsule Take 1 Capsule by mouth daily for 30 days. 30 Capsule 0    atorvastatin (LIPITOR) 80 mg tablet Take 1 Tablet by mouth nightly for 30 days. 30 Tablet 0    amLODIPine (NORVASC) 10 mg tablet Take 1 Tablet by mouth daily for 30 days. 30 Tablet 0        No Known Allergies    Social History     Tobacco Use    Smoking status: Former Smoker     Packs/day: 0.50     Years: 10.00     Pack years: 5.00     Quit date: 7/3/2017     Years since quittin.4    Smokeless tobacco: Never Used   Vaping Use    Vaping Use: Never used   Substance Use Topics    Alcohol use: Yes     Alcohol/week: 6.0 standard drinks     Types: 6 Glasses of wine per week     Comment: light    Drug use: Yes     Types: Marijuana       Family History   Problem Relation Age of Onset    Hypertension Mother     Diabetes Mother     Hypertension Father     Heart Disease Father     Stroke Maternal Grandmother     Lung Cancer Maternal Grandmother        Review of Systems  GENERAL: Denies fever or fatigue  CARDIAC: No CP or SOB  PULMONARY: No cough of SOB  MUSCULOSKELETAL: No new joint pain  NEURO: SEE HPI    Examination  Visit Vitals  /66 (BP 1 Location: Left upper arm, BP Patient Position: Sitting, BP Cuff Size: Adult)   Pulse 91   Resp 18   Ht 5' 5\" (1.651 m)   Wt 82.5 kg (181 lb 12.8 oz)   LMP 11/15/2021 (Within Days)   SpO2 97%   BMI 30.25 kg/m²       This is a very pleasant 56-year-old female. She is alert and in no apparent distress. Heart is regular. Lungs clear to auscultation bilaterally. No bruit appreciated bilaterally. No abnormality in facial sensation.  No facial asymmetry appreciated. Tongue midline. Extraocular movements intact. Pupils equal round reactive to light. Equal shoulder shrug. Finger-nose-finger intact with no signs of abnormal involuntary movements or dysmetria. Resists fully in the upper and lower extremities. Reflexes 2+ throughout. Toes downgoing. Steady gait able to tandem walk. Significant Diagnostic Studies:   -Head CT 10/29/21:  IMPRESSION  Negative noncontrast head CT.  - CXR 10/29/21:  IMPRESSION     No acute findings.      -MRA brain 10/29/21:  IMPRESSION     Patent intracranial cerebral arteries.    -MRI brain 10/29/21:  IMPRESSION  No acute infarct, hemorrhage, or mass lesion.      -Cardiac echo 10/30/21:  Result status: Final result   · IAS: Agitated saline contrast study was performed. There was no shunting at baseline or with Valsalva. · LV: Estimated LVEF is 55 - 60%. Visually measured ejection fraction. Normal cavity size, systolic function (ejection fraction normal) and diastolic function. Mild concentric hypertrophy. Wall motion: normal.  · LA: Left Atrium volume index is 29 mL/m2. · Saline contrast was given to evaluate for intracardiac shunt.            -Carotid duplex 10/30/21:  · Normal extracranial cerebrovascular examination  · No evidence of significant stenosis in the external carotid arteries. · Antegrade vertebral arteries bilaterally. · Normal velocities in the the subclavian arteries bilaterally. Impression/Plan  Hermilo Field is a 42 y.o. female whose history and physical are consistent with TIA. Patient had unremarkable evaluation including MRA of the brain, MRI of the brain, carotid Doppler, and echocardiogram.  Diagnosed with TIA and placed on dual antiplatelet therapy for 3 weeks and then to increase on aspirin alone at a dose of 162 mg and high-dose statin. She remains on these today. Her blood pressure is controlled in office today at 112/66. She is on hydrochlorothiazide 12.5 mg.  She describes intermittent lightheadedness on position change. Likely component of orthostatic hypotension. Encourage adequate hydration and slow position changes. If symptoms continue she should discuss this with her primary care provider. Encouraged her to check her blood pressure at home upon symptoms. Endorses transient blurred vision. Denies blurred vision today. Tells me she has never had a dilated eye exam. Referral placed for such. Describes left facial twisting. Consideration for muscle spasm. Her examination today is reassuringly nonfocal. Facial sensation preserved bilaterally. No facial weakness noted on exam today. Her speech is fluent and clear. Would recommend continued evaluation and no additional testing at this standpoint due to transient symptoms and unremarkable examination. If right arm symptoms continue may move forward with EMG testing if warranted. She will maintain routine follow-ups with her primary care provider. She will follow-up with me in 3 months or sooner as need be. All questions addressed and patient is agreeable with plan of care. Diagnoses and all orders for this visit:    1. Blurred vision, left eye  -     REFERRAL TO OPHTHALMOLOGY    2. TIA (transient ischemic attack)    3. On statin therapy    4. Antiplatelet or antithrombotic long-term use    5. Episodic lightheadedness        Signed By: Michelle De Santiago NP        PLEASE NOTE:   Portions of this document may have been produced using voice recognition software. Unrecognized errors in transcription may be present.

## 2021-12-29 RX ORDER — HYDROCHLOROTHIAZIDE 12.5 MG/1
CAPSULE ORAL
Qty: 30 CAPSULE | Refills: 0 | Status: SHIPPED | OUTPATIENT
Start: 2021-12-29 | End: 2022-01-23

## 2021-12-29 RX ORDER — AMLODIPINE BESYLATE 10 MG/1
TABLET ORAL
Qty: 30 TABLET | Refills: 0 | Status: SHIPPED | OUTPATIENT
Start: 2021-12-29 | End: 2022-01-23

## 2021-12-29 RX ORDER — ATORVASTATIN CALCIUM 80 MG/1
TABLET, FILM COATED ORAL
Qty: 30 TABLET | Refills: 0 | Status: SHIPPED | OUTPATIENT
Start: 2021-12-29 | End: 2022-01-23

## 2022-01-23 RX ORDER — AMLODIPINE BESYLATE 10 MG/1
TABLET ORAL
Qty: 30 TABLET | Refills: 0 | Status: SHIPPED | OUTPATIENT
Start: 2022-01-23 | End: 2022-02-21

## 2022-01-23 RX ORDER — ATORVASTATIN CALCIUM 80 MG/1
TABLET, FILM COATED ORAL
Qty: 30 TABLET | Refills: 0 | Status: SHIPPED | OUTPATIENT
Start: 2022-01-23 | End: 2022-02-21

## 2022-01-23 RX ORDER — HYDROCHLOROTHIAZIDE 12.5 MG/1
CAPSULE ORAL
Qty: 30 CAPSULE | Refills: 0 | Status: SHIPPED | OUTPATIENT
Start: 2022-01-23 | End: 2022-02-21

## 2022-02-14 PROBLEM — G45.9 TIA (TRANSIENT ISCHEMIC ATTACK): Status: RESOLVED | Noted: 2021-10-29 | Resolved: 2022-02-14

## 2022-02-14 PROBLEM — Z86.73 HISTORY OF TIA (TRANSIENT ISCHEMIC ATTACK): Status: ACTIVE | Noted: 2022-02-14

## 2022-02-21 RX ORDER — HYDROCHLOROTHIAZIDE 12.5 MG/1
CAPSULE ORAL
Qty: 30 CAPSULE | Refills: 0 | Status: SHIPPED | OUTPATIENT
Start: 2022-02-21 | End: 2022-04-05 | Stop reason: SDUPTHER

## 2022-02-21 RX ORDER — AMLODIPINE BESYLATE 10 MG/1
TABLET ORAL
Qty: 30 TABLET | Refills: 0 | Status: SHIPPED | OUTPATIENT
Start: 2022-02-21 | End: 2022-04-05 | Stop reason: SDUPTHER

## 2022-02-21 RX ORDER — ATORVASTATIN CALCIUM 80 MG/1
TABLET, FILM COATED ORAL
Qty: 30 TABLET | Refills: 0 | Status: SHIPPED | OUTPATIENT
Start: 2022-02-21 | End: 2022-04-05 | Stop reason: SDUPTHER

## 2022-02-21 NOTE — TELEPHONE ENCOUNTER
Given 30-days supply. Patient needs fasting labs and follow up one week after. Please notify patient. Thank you!

## 2022-03-18 PROBLEM — Z86.73 HISTORY OF TIA (TRANSIENT ISCHEMIC ATTACK): Status: ACTIVE | Noted: 2022-02-14

## 2022-03-19 PROBLEM — I10 PRIMARY HYPERTENSION: Status: ACTIVE | Noted: 2021-10-29

## 2022-03-28 ENCOUNTER — HOSPITAL ENCOUNTER (OUTPATIENT)
Dept: LAB | Age: 47
Discharge: HOME | End: 2022-03-28

## 2022-03-28 LAB — XX-LABCORP SPECIMEN COL,LCBCF: NORMAL

## 2022-03-28 PROCEDURE — 99001 SPECIMEN HANDLING PT-LAB: CPT

## 2022-03-29 LAB
ALBUMIN SERPL-MCNC: 3.8 G/DL (ref 3.8–4.8)
ALBUMIN/GLOB SERPL: 1.1 {RATIO} (ref 1.2–2.2)
ALP SERPL-CCNC: 88 IU/L (ref 44–121)
ALT SERPL-CCNC: 92 IU/L (ref 0–32)
AST SERPL-CCNC: 46 IU/L (ref 0–40)
BILIRUB SERPL-MCNC: 0.4 MG/DL (ref 0–1.2)
BUN SERPL-MCNC: 8 MG/DL (ref 6–24)
BUN/CREAT SERPL: 13 (ref 9–23)
CALCIUM SERPL-MCNC: 8.7 MG/DL (ref 8.7–10.2)
CHLORIDE SERPL-SCNC: 103 MMOL/L (ref 96–106)
CHOLEST SERPL-MCNC: 120 MG/DL (ref 100–199)
CO2 SERPL-SCNC: 22 MMOL/L (ref 20–29)
CREAT SERPL-MCNC: 0.6 MG/DL (ref 0.57–1)
EGFR: 113 ML/MIN/1.73
GLOBULIN SER CALC-MCNC: 3.5 G/DL (ref 1.5–4.5)
GLUCOSE SERPL-MCNC: 89 MG/DL (ref 65–99)
HDLC SERPL-MCNC: 53 MG/DL
IMP & REVIEW OF LAB RESULTS: NORMAL
LDLC SERPL CALC-MCNC: 55 MG/DL (ref 0–99)
POTASSIUM SERPL-SCNC: 3.8 MMOL/L (ref 3.5–5.2)
PROT SERPL-MCNC: 7.3 G/DL (ref 6–8.5)
SODIUM SERPL-SCNC: 139 MMOL/L (ref 134–144)
TRIGL SERPL-MCNC: 49 MG/DL (ref 0–149)
VLDLC SERPL CALC-MCNC: 12 MG/DL (ref 5–40)

## 2022-04-04 PROBLEM — E78.5 HYPERLIPIDEMIA LDL GOAL <70: Status: ACTIVE | Noted: 2022-04-04

## 2022-04-04 PROBLEM — R79.89 ELEVATED LFTS: Status: ACTIVE | Noted: 2022-04-04

## 2022-04-04 NOTE — ASSESSMENT & PLAN NOTE
Asymptomatic, consult GI  Repeat hepatic function panel in 12 weeks  Reduce ETOH intake, avoid Tylenol use

## 2022-04-04 NOTE — ASSESSMENT & PLAN NOTE
LDL now at goal, <70, recheck lipid panel in 6 mos  Now with elevated LFTs, watch closely while on statin  Repeat hepatic function panel in 3 mos  Follow up with MARISSA

## 2022-04-04 NOTE — PROGRESS NOTES
Chief Complaint   Patient presents with    Annual Wellness Visit    Cholesterol Problem    Hypertension    Labs    Extremity Weakness     2 weeks ago patient states that she had another episode of her face feeling numbness and tingling on the left side      Assessment & Plan:     1. Primary hypertension  Assessment & Plan:  BP stable, goal <130/80, continue regimen  Orders:  -     amLODIPine (NORVASC) 10 mg tablet; Take 1 Tablet by mouth daily. Indications: high blood pressure, Normal, Disp-90 Tablet, R-1  -     hydroCHLOROthiazide (MICROZIDE) 12.5 mg capsule; Take 1 Capsule by mouth daily. Indications: high blood pressure, Normal, Disp-90 Capsule, R-1  2. History of TIA (transient ischemic attack)  Assessment & Plan:  No recurrence of symptoms noted, continue secondary prevention  Orders:  -     LIPID PANEL; Future  -     atorvastatin (LIPITOR) 80 mg tablet; Take 1 Tablet by mouth nightly., Normal, Disp-90 Tablet, R-0  3. Hyperlipidemia LDL goal <70  Assessment & Plan:  LDL now at goal, <70, recheck lipid panel in 6 mos  Now with elevated LFTs, watch closely while on statin  Repeat hepatic function panel in 3 mos  Follow up with GI  Orders:  -     LIPID PANEL; Future  -     METABOLIC PANEL, COMPREHENSIVE; Future  -     atorvastatin (LIPITOR) 80 mg tablet; Take 1 Tablet by mouth nightly., Normal, Disp-90 Tablet, R-0  4. Elevated LFTs  Assessment & Plan:  Asymptomatic, consult GI  Repeat hepatic function panel in 12 weeks  Reduce ETOH intake, avoid Tylenol use  Orders:  -     REFERRAL TO GASTROENTEROLOGY  -     HEPATIC FUNCTION PANEL; Future  5. Screen for colon cancer  -     REFERRAL TO GASTROENTEROLOGY  6.  Encounter to discuss test results    Follow-up and Dispositions    · Return in about 4 weeks (around 5/3/2022) for PAP and  · Return in 3 mos for blood pressure, cholesterol, history of TIA, elevated liver enzymes, lab results (hepatic function panel)       Subjective:     HPI    Hypertension-  Symptoms: None  BP readings at home are 1-teens systolic  Comorbid: Hx of TIA  Current treatment: amlodipine 10 mg, HCTZ 12.5 mg    Hx of TIA-  Onset:  10/29/2022  Has been evaluated by neurology  No further testing recommended  Was referred to ophthalmology for blurry vision  Currently taking atorvastatin 80 mg with 81 mg ASA daily    Elevated LFTs-  Onset: 03/2022  Symptoms:  None  ETOH: occasional wine, twice a month  Hepatotoxic meds: atorvastatin   Imaging:   None    Hyperlipidemia  Compliant with meds  S/E from medication:  None  Comorbid: HTN, hx of TIA  Current treatment:  Atorvastatin 80 mg    Health Maintenance:  COVID-19 vac - due for booster  Colonoscopy - referred  PAP - will schedule  MWV - done today    Review of Systems   Constitutional: Negative for chills, fever and malaise/fatigue. Respiratory: Negative for shortness of breath. Cardiovascular: Negative for chest pain. Gastrointestinal: Negative for abdominal pain, nausea and vomiting. Neurological: Positive for tingling. Negative for dizziness and headaches. Objective:   /80 (BP 1 Location: Left upper arm, BP Patient Position: Sitting, BP Cuff Size: Adult)   Pulse 72   Temp 97.8 °F (36.6 °C) (Oral)   Resp 18   Ht 5' 5\" (1.651 m)   Wt 180 lb (81.6 kg)   SpO2 100%   BMI 29.95 kg/m²      Physical Exam  Vitals and nursing note reviewed. Constitutional:       General: She is not in acute distress. Appearance: She is not ill-appearing. HENT:      Head: Normocephalic and atraumatic. Cardiovascular:      Rate and Rhythm: Normal rate and regular rhythm. Heart sounds: No murmur heard. No friction rub. No gallop. Pulmonary:      Effort: Pulmonary effort is normal. No respiratory distress. Breath sounds: No wheezing, rhonchi or rales. Skin:     General: Skin is warm and dry. Neurological:      General: No focal deficit present. Mental Status: She is alert and oriented to person, place, and time.    Psychiatric: Mood and Affect: Mood normal.         Thought Content:  Thought content normal.         Judgment: Judgment normal.        Higinio Mohs, FNP-C

## 2022-04-05 ENCOUNTER — OFFICE VISIT (OUTPATIENT)
Dept: FAMILY MEDICINE CLINIC | Age: 47
End: 2022-04-05
Payer: MEDICARE

## 2022-04-05 VITALS
RESPIRATION RATE: 18 BRPM | HEIGHT: 65 IN | HEART RATE: 72 BPM | BODY MASS INDEX: 29.99 KG/M2 | WEIGHT: 180 LBS | TEMPERATURE: 97.8 F | DIASTOLIC BLOOD PRESSURE: 80 MMHG | SYSTOLIC BLOOD PRESSURE: 131 MMHG | OXYGEN SATURATION: 100 %

## 2022-04-05 DIAGNOSIS — Z71.2 ENCOUNTER TO DISCUSS TEST RESULTS: ICD-10-CM

## 2022-04-05 DIAGNOSIS — I10 PRIMARY HYPERTENSION: ICD-10-CM

## 2022-04-05 DIAGNOSIS — Z86.73 HISTORY OF TIA (TRANSIENT ISCHEMIC ATTACK): ICD-10-CM

## 2022-04-05 DIAGNOSIS — Z00.00 MEDICARE ANNUAL WELLNESS VISIT, SUBSEQUENT: Primary | ICD-10-CM

## 2022-04-05 DIAGNOSIS — Z12.11 SCREEN FOR COLON CANCER: ICD-10-CM

## 2022-04-05 DIAGNOSIS — Z71.89 ACP (ADVANCE CARE PLANNING): ICD-10-CM

## 2022-04-05 DIAGNOSIS — R79.89 ELEVATED LFTS: ICD-10-CM

## 2022-04-05 DIAGNOSIS — E78.5 HYPERLIPIDEMIA LDL GOAL <70: ICD-10-CM

## 2022-04-05 PROCEDURE — G8432 DEP SCR NOT DOC, RNG: HCPCS | Performed by: NURSE PRACTITIONER

## 2022-04-05 PROCEDURE — G0439 PPPS, SUBSEQ VISIT: HCPCS | Performed by: NURSE PRACTITIONER

## 2022-04-05 PROCEDURE — G8754 DIAS BP LESS 90: HCPCS | Performed by: NURSE PRACTITIONER

## 2022-04-05 PROCEDURE — G8417 CALC BMI ABV UP PARAM F/U: HCPCS | Performed by: NURSE PRACTITIONER

## 2022-04-05 PROCEDURE — 99497 ADVNCD CARE PLAN 30 MIN: CPT | Performed by: NURSE PRACTITIONER

## 2022-04-05 PROCEDURE — G8752 SYS BP LESS 140: HCPCS | Performed by: NURSE PRACTITIONER

## 2022-04-05 PROCEDURE — G8427 DOCREV CUR MEDS BY ELIG CLIN: HCPCS | Performed by: NURSE PRACTITIONER

## 2022-04-05 PROCEDURE — 99214 OFFICE O/P EST MOD 30 MIN: CPT | Performed by: NURSE PRACTITIONER

## 2022-04-05 RX ORDER — ATORVASTATIN CALCIUM 80 MG/1
80 TABLET, FILM COATED ORAL
Qty: 90 TABLET | Refills: 0 | Status: SHIPPED | OUTPATIENT
Start: 2022-04-05 | End: 2022-06-20

## 2022-04-05 RX ORDER — AMLODIPINE BESYLATE 10 MG/1
10 TABLET ORAL DAILY
Qty: 90 TABLET | Refills: 1 | Status: SHIPPED | OUTPATIENT
Start: 2022-04-05 | End: 2022-09-23

## 2022-04-05 RX ORDER — HYDROCHLOROTHIAZIDE 12.5 MG/1
12.5 CAPSULE ORAL DAILY
Qty: 90 CAPSULE | Refills: 1 | Status: SHIPPED | OUTPATIENT
Start: 2022-04-05 | End: 2022-09-23

## 2022-04-05 NOTE — LETTER
NOTIFICATION RETURN TO WORK / SCHOOL    4/5/2022 11:32 AM    Ms. Maru Chiu  [address] [place] [id number]      To Whom It May Concern:    Maru Chiu is currently under the care of Kelly Maxwell. She will return to work/school on: 04/07/2022. If there are questions or concerns please have the patient contact our office.         Sincerely,      Carolann Valenzuela NP

## 2022-04-05 NOTE — PROGRESS NOTES
This is the Subsequent Medicare Annual Wellness Exam, performed 12 months or more after the Initial AWV or the last Subsequent AWV    I have reviewed the patient's medical history in detail and updated the computerized patient record. Assessment/Plan   Education and counseling provided:  Are appropriate based on today's review and evaluation  End-of-Life planning (with patient's consent)    1. Medicare annual wellness visit, subsequent  2. ACP (advance care planning)  3. Screen for colon cancer  -     REFERRAL TO GASTROENTEROLOGY    Follow-up and Dispositions    · Return in about 4 weeks (around 5/3/2022) for PAP and  · Return in 3 mos for cholesterol, blood pressure, history of TIA, elevated liver enzymes, lab results (hepatic function panel)       Depression Risk Factor Screening     3 most recent PHQ Screens 4/5/2022   Little interest or pleasure in doing things Not at all   Feeling down, depressed, irritable, or hopeless Not at all   Total Score PHQ 2 0       Alcohol & Drug Abuse Risk Screen    Do you average more than 1 drink per night or more than 7 drinks a week:  Yes    On any one occasion in the past three months have you have had more than 3 drinks containing alcohol:  No          Functional Ability and Level of Safety    Hearing: Hearing is good. Activities of Daily Living: The home contains: no safety equipment. Patient does total self care      Ambulation: with no difficulty     Fall Risk:  No flowsheet data found.    Abuse Screen:  Patient is not abused     Cognitive Screening    Has your family/caregiver stated any concerns about your memory: no     Health Maintenance Due     Health Maintenance Due   Topic Date Due    Cervical cancer screen  Never done    COVID-19 Vaccine (3 - Booster for Moderna series) 01/08/2022    Colorectal Cancer Screening Combo  Never done    Medicare Yearly Exam  03/28/2022     Patient Care Team   Patient Care Team:  Jaxson Norton NP as PCP - General (Nurse Practitioner)  Luis Tubbs NP as PCP - [facility] Empaneled Provider  Lupe Ruiz MD (Surgical Oncology)  Kim Blair MD (Neurology)    History     Patient Active Problem List   Diagnosis Code    Primary hypertension I10    History of TIA (transient ischemic attack) Z86.73    Hyperlipidemia LDL goal <70 E78.5    Elevated LFTs R79.89     Past Medical History:   Diagnosis Date    Breast cancer screening by mammogram 2019    Breast biopsy benign. May resume routine annual screenings.  Hypercholesterolemia     Hypertension     TIA (transient ischemic attack)       Past Surgical History:   Procedure Laterality Date    HX BREAST BIOPSY  2006    Right-benign    HX OOPHORECTOMY      HX OTHER SURGICAL  2013    left neck cyst removal    HX OTHER SURGICAL Right 2017    Pelvic surgery possibly ovary     Current Outpatient Medications   Medication Sig Dispense Refill    atorvastatin (LIPITOR) 80 mg tablet Take 1 Tablet by mouth nightly. 90 Tablet 0    amLODIPine (NORVASC) 10 mg tablet Take 1 Tablet by mouth daily. Indications: high blood pressure 90 Tablet 1    hydroCHLOROthiazide (MICROZIDE) 12.5 mg capsule Take 1 Capsule by mouth daily. Indications: high blood pressure 90 Capsule 1    aspirin delayed-release 81 mg tablet Take 162 mg by mouth daily. No Known Allergies    Family History   Problem Relation Age of Onset    Hypertension Mother     Diabetes Mother     Hypertension Father     Heart Disease Father     Stroke Maternal Grandmother     Lung Cancer Maternal Grandmother      Social History     Tobacco Use    Smoking status: Former Smoker     Packs/day: 0.50     Years: 10.00     Pack years: 5.00     Quit date: 7/3/2017     Years since quittin.7    Smokeless tobacco: Never Used   Substance Use Topics    Alcohol use:  Yes     Alcohol/week: 6.0 standard drinks     Types: 6 Glasses of wine per week     Comment: [employer]ER[employer] Toney

## 2022-04-05 NOTE — PROGRESS NOTES
Advance Care Planning     Advance Care Planning (ACP) Physician/NP/PA Conversation      Date of Conversation: 4/5/2022  Conducted with: Patient with Decision Making Capacity    Healthcare Decision Maker:     Primary Decision Maker: Edmond Randall - [phone]  Click here to complete [address] including selection of the Healthcare Decision Maker Relationship (ie \"Primary\")  Today we documented Decision Maker(s). The patient will provide ACP documents. Care Preferences:    Hospitalization: \"If your health worsens and it becomes clear that your chance of recovery is unlikely, what would be your preference regarding hospitalization? \"  The patient is unsure. Ventilation: \"If you were unable to breathe on your own and your chance of recovery was unlikely, what would be your preference about the use of a ventilator (breathing machine) if it was available to you? \"   The patient would NOT desire the use of a ventilator. Resuscitation: \"In the event your heart stopped as a result of an underlying serious health condition, would you want attempts to be made to restart your heart, or would you prefer a natural death? \"   Yes, attempt to resuscitate.     Conversation Outcomes / Follow-Up Plan:   ACP in process - information provided, considering goals and options      Length of Voluntary ACP Conversation in minutes:  16 minutes    Eliza Flowers NP

## 2022-04-05 NOTE — PATIENT INSTRUCTIONS
Medicare Wellness Visit, Female     The best way to live healthy is to have a lifestyle where you eat a well-balanced diet, exercise regularly, limit alcohol use, and quit all forms of tobacco/nicotine, if applicable. Regular preventive services are another way to keep healthy. Preventive services (vaccines, screening tests, monitoring & exams) can help personalize your care plan, which helps you manage your own care. Screening tests can find health problems at the earliest stages, when they are easiest to treat. [facility] follows the current, evidence-based guidelines published by the [facility] (RUSTSTF) when recommending preventive services for our patients. Because we follow these guidelines, sometimes recommendations change over time as research supports it. (For example, mammograms used to be recommended annually. Even though Medicare will still pay for an annual mammogram, the newer guidelines recommend a mammogram every two years for women of average risk). Of course, you and your doctor may decide to screen more often for some diseases, based on your risk and your co-morbidities (chronic disease you are already diagnosed with). Preventive services for you include:  - Medicare offers their members a free annual wellness visit, which is time for you and your primary care provider to discuss and plan for your preventive service needs. Take advantage of this benefit every year!  -All adults over the age of 72 should receive the recommended pneumonia vaccines. Current USPSTF guidelines recommend a series of two vaccines for the best pneumonia protection.   -All adults should have a flu vaccine yearly and a tetanus vaccine every 10 years.   -All adults age 48 and older should receive the shingles vaccines (series of two vaccines).       -All adults age 38-68 who are overweight should have a diabetes screening test once every three years.   -All adults born between [birth year] and 1965 should be screened once for Hepatitis C.  -Other screening tests and preventive services for persons with diabetes include: an eye exam to screen for diabetic retinopathy, a kidney function test, a foot exam, and stricter control over your cholesterol.   -Cardiovascular screening for adults with routine risk involves an electrocardiogram (ECG) at intervals determined by your doctor.   -Colorectal cancer screenings should be done for adults age 54-65 with no increased risk factors for colorectal cancer. There are a number of acceptable methods of screening for this type of cancer. Each test has its own benefits and drawbacks. Discuss with your doctor what is most appropriate for you during your annual wellness visit. The different tests include: colonoscopy (considered the best screening method), a fecal occult blood test, a fecal DNA test, and sigmoidoscopy.    -A bone mass density test is recommended when a woman turns 65 to screen for osteoporosis. This test is only recommended one time, as a screening. Some providers will use this same test as a disease monitoring tool if you already have osteoporosis. -Breast cancer screenings are recommended every other year for women of normal risk, age 54-69.  -Cervical cancer screenings for women over age 72 are only recommended with certain risk factors.      Here is a list of your current Health Maintenance items (your personalized list of preventive services) with a due date:  Health Maintenance Due   Topic Date Due    Cervical cancer screen  Never done    COVID-19 Vaccine (3 - Booster for Reyes Meiers series) 01/08/2022    Colorectal Screening  Never done    Annual Well Visit  03/28/2022

## 2022-04-05 NOTE — PROGRESS NOTES
Elliott Mcmahon presents today for   Chief Complaint   Patient presents with    Annual Wellness Visit    Cholesterol Problem    Hypertension    Labs    Extremity Weakness     2 weeks ago patient states that she had another episode of her face feeling numbness and tingling on the left side        Is someone accompanying this pt? no    Is the patient using any DME equipment during OV? no    Depression Screening:  3 most recent PHQ Screens 4/5/2022   Little interest or pleasure in doing things Not at all   Feeling down, depressed, irritable, or hopeless Not at all   Total Score PHQ 2 0       Learning Assessment:  Learning Assessment 4/5/2022   PRIMARY LEARNER Patient   HIGHEST LEVEL OF EDUCATION - PRIMARY LEARNER  GRADUATED HIGH SCHOOL OR GED   BARRIERS PRIMARY LEARNER NONE   CO-LEARNER CAREGIVER No   PRIMARY LANGUAGE ENGLISH    NEED -   LEARNER PREFERENCE PRIMARY DEMONSTRATION   ANSWERED BY patient    RELATIONSHIP SELF       Fall Risk  No flowsheet data found. ADL  No flowsheet data found. Travel Screening:    Travel Screening     Question   Response    In the last 10 days, have you been in contact with someone who was confirmed or suspected to have Coronavirus/COVID-19? No / Unsure    Have you had a COVID-19 viral test in the last 10 days? No    Do you have any of the following new or worsening symptoms? Have you traveled internationally or domestically in the last month? No      Travel History   Travel since 03/05/22    No documented travel since 03/05/22         Health Maintenance reviewed and discussed and ordered per Provider. Health Maintenance Due   Topic Date Due    Cervical cancer screen  Never done    COVID-19 Vaccine (3 - Booster for Moderna series) 01/08/2022    Colorectal Cancer Screening Combo  Never done    Medicare Yearly Exam  03/28/2022   . Coordination of Care:  1. Have you been to the ER, urgent care clinic since your last visit?  Hospitalized since your last visit? no    2. Have you seen or consulted any other health care providers outside of the [address] [place] [address] since your last visit? Include any pap smears or colon screening.  no

## 2022-05-09 NOTE — PROGRESS NOTES
Assessment & Plan:     1. Well woman exam  -     PAP IG, APTIMA HPV AND RFX 16/18,45 ([id number]); Future  2. Pap smear for cervical cancer screening  -     PAP IG, APTIMA HPV AND RFX 16/18,45 ([id number]); Future  3. Vaginal discharge  Comments:  Nuswab sent, will call with results  Orders:  -     BACTERIAL VAGINOSIS AMPLIFICATION; Future  -     CANDIDA VAGINITIS AMPLIFICATION; Future  4. Encounter for screening mammogram for malignant neoplasm of breast  -     AROLDO MAMMO BI SCREENING INCL CAD; Future  5. History of TIA (transient ischemic attack)  Assessment & Plan:  Continue statin and ASA, advised to call neurology for follow up     Follow-up and Dispositions    · Return in about 5 months (around 10/10/2022) for cholesterol, elevated liver enzymes, blood pressure, lab results. Subjective:     HPI    OB/Gyn Hx  Last PAP: over 4 years ago  Hx of abnormal PAP: No  STDs: No    Date of LMP: end   Birth Control: No  Hysterectomy: No  Family hx of breast/cervical/uterine/ovarian cancer: No  Breast complaints: No  Vaginal symptoms: No    TIA Symptoms-  Has hx of this  She is followed by neurology, last seen in 2021  Plan was to follow up 3 mos from that time, but has not been seen, does not have appt  Last episode was Mother's Day where the right side of her face was tightening up and felt fatigued after the episode  She is on ASA 81 mg and atorvastatin 80 mg    Health Maintenance:  COVID-19 vac - due for booster  Colonoscopy - has an appointment on 2022      Review of Systems:    Constitutional: Negative. Skin: Negative. Respiratory: Negative for cough and shortness of breath. Breast: Negative for breast pain, breast mass, swelling, nipple pain and nipple discharge  Cardiovascular:  Negative for leg swelling and palpitations.     Genitourinary: Negative for dysuria, frequency, vaginal bleeding, vaginal discharge, vaginal pain, vaginal burning, vaginal itching, vaginal odor and vaginal lesion. Endocrine: Negative for cold intolerance and heat intolerance. Objective:   /71 (BP 1 Location: Left upper arm, BP Patient Position: Sitting, BP Cuff Size: Adult)   Pulse 68   Temp 97.9 °F (36.6 °C) (Oral)   Resp 17   Ht 5' 5\" (1.651 m)   Wt 177 lb (80.3 kg)   SpO2 99%   BMI 29.45 kg/m²      Physical Exam  Vitals signs and nursing note reviewed. Exam conducted with a chaperone present. Constitutional:       Appearance: Normal appearance. HENT:      Head: Normocephalic and atraumatic. Neck:      Musculoskeletal: Normal range of motion and neck supple. Cardiovascular:      Rate and Rhythm: Normal rate and regular rhythm. Pulses: Normal pulses. Heart sounds: No murmur. No friction rub. No gallop. Pulmonary:      Effort: Pulmonary effort is normal. No respiratory distress. Breath sounds: No wheezing, rhonchi or rales. Abdominal:      General: Bowel sounds are normal. There is no distension. Palpations: Abdomen is soft. There is no mass. Tenderness: There is no abdominal tenderness. There is no right CVA tenderness, left CVA tenderness, guarding or rebound. Musculoskeletal: Normal range of motion. General: No deformity. Lymphadenopathy:      Cervical: No cervical adenopathy. Skin:     General: Skin is warm and dry. Neurological:      Mental Status: She is alert and oriented to person, place, and time. Psychiatric:         Mood and Affect: Mood normal.         Behavior: Behavior normal.     Breasts: breasts appear normal, no suspicious masses, no skin or nipple changes or axillary nodes.   Pelvic exam:   VULVA: normal appearing vulva with no masses, tenderness or lesions,   VAGINA: vaginal discharge - white, creamy and thick,   CERVIX: normal appearing cervix without discharge or lesions,   UTERUS: uterus is normal size, shape, consistency and nontender,   ADNEXA: normal adnexa in size, nontender and no masses,   RECTAL: normal rectal, no masses,   Exam chaperoned by Maximino Dent, [facility], [facility]-

## 2022-05-10 ENCOUNTER — HOSPITAL ENCOUNTER (OUTPATIENT)
Dept: LAB | Age: 47
Discharge: HOME | End: 2022-05-10
Payer: MEDICAID

## 2022-05-10 ENCOUNTER — OFFICE VISIT (OUTPATIENT)
Dept: FAMILY MEDICINE CLINIC | Age: 47
End: 2022-05-10

## 2022-05-10 VITALS
TEMPERATURE: 97.9 F | WEIGHT: 177 LBS | RESPIRATION RATE: 17 BRPM | HEIGHT: 65 IN | OXYGEN SATURATION: 99 % | SYSTOLIC BLOOD PRESSURE: 115 MMHG | BODY MASS INDEX: 29.49 KG/M2 | HEART RATE: 68 BPM | DIASTOLIC BLOOD PRESSURE: 71 MMHG

## 2022-05-10 DIAGNOSIS — B37.31 CANDIDA VAGINITIS: ICD-10-CM

## 2022-05-10 DIAGNOSIS — Z12.31 ENCOUNTER FOR SCREENING MAMMOGRAM FOR MALIGNANT NEOPLASM OF BREAST: ICD-10-CM

## 2022-05-10 DIAGNOSIS — N89.8 VAGINAL DISCHARGE: ICD-10-CM

## 2022-05-10 DIAGNOSIS — Z01.419 WELL WOMAN EXAM: Primary | ICD-10-CM

## 2022-05-10 DIAGNOSIS — Z86.73 HISTORY OF TIA (TRANSIENT ISCHEMIC ATTACK): ICD-10-CM

## 2022-05-10 DIAGNOSIS — Z12.4 PAP SMEAR FOR CERVICAL CANCER SCREENING: ICD-10-CM

## 2022-05-10 PROCEDURE — 87801 DETECT AGNT MULT DNA AMPLI: CPT

## 2022-05-10 PROCEDURE — 99396 PREV VISIT EST AGE 40-64: CPT | Performed by: NURSE PRACTITIONER

## 2022-05-10 PROCEDURE — 88175 CYTOPATH C/V AUTO FLUID REDO: CPT

## 2022-05-10 PROCEDURE — 87798 DETECT AGENT NOS DNA AMP: CPT

## 2022-05-10 PROCEDURE — 87624 HPV HI-RISK TYP POOLED RSLT: CPT

## 2022-05-10 NOTE — PROGRESS NOTES
Estefani Welch presents today for   Chief Complaint   Patient presents with    Well Woman       Is someone accompanying this pt? no    Is the patient using any DME equipment during OV? no    Depression Screening:  3 most recent PHQ Screens 4/5/2022   Little interest or pleasure in doing things Not at all   Feeling down, depressed, irritable, or hopeless Not at all   Total Score PHQ 2 0       Learning Assessment:  Learning Assessment 4/5/2022   PRIMARY LEARNER Patient   HIGHEST LEVEL OF EDUCATION - PRIMARY LEARNER  GRADUATED HIGH SCHOOL OR GED   BARRIERS PRIMARY LEARNER NONE   CO-LEARNER CAREGIVER No   PRIMARY LANGUAGE ENGLISH    NEED -   LEARNER PREFERENCE PRIMARY DEMONSTRATION   ANSWERED BY patient    RELATIONSHIP SELF       Fall Risk  No flowsheet data found. ADL  No flowsheet data found. Travel Screening:    Travel Screening     No screening recorded since 05/09/22 0000     Travel History   Travel since 04/10/22    No documented travel since 04/10/22         Health Maintenance reviewed and discussed and ordered per Provider. Health Maintenance Due   Topic Date Due    Cervical cancer screen  Never done    COVID-19 Vaccine (3 - Booster for Moderna series) 01/08/2022    Colorectal Cancer Screening Combo  Never done   . Coordination of Care:  1. Have you been to the ER, urgent care clinic since your last visit? Hospitalized since your last visit? no    2. Have you seen or consulted any other health care providers outside of the [address] [place] [address] since your last visit? Include any pap smears or colon screening.  no

## 2022-05-10 NOTE — PATIENT INSTRUCTIONS
Pap Test: Care Instructions  Overview     The Pap test (also called a Pap smear) is a screening test for cancer of the cervix, which is the lower part of the uterus that opens into the vagina. The test can help your doctor find early changes in the cells that could lead to cancer. The sample of cells taken during your test has been sent to a lab so that an expert can look at the cells. It usually takes a week or two to get the results back. Follow-up care is a key part of your treatment and safety. Be sure to make and go to all appointments, and call your doctor if you are having problems. It's also a good idea to know your test results and keep a list of the medicines you take. What do the results mean? · A normal result means that the test did not find any abnormal cells in the sample. · An abnormal result can mean many things. Most of these are not cancer. The results of your test may be abnormal because:  ? You have an infection of the vagina or cervix, such as a yeast infection. ? You have an IUD (intrauterine device for birth control). ? You have low estrogen levels after menopause that are causing the cells to change. ? You have cell changes that may be a sign of precancer or cancer. The results are ranked based on how serious the changes might be. There are many other reasons why you might not get a normal result. If the results were abnormal, you may need to get another test within a few weeks or months. If the results show changes that could be a sign of cancer, you may need a test called a colposcopy, which provides a more complete view of the cervix. Sometimes the lab cannot use the sample because it does not contain enough cells or was not preserved well. If so, you may need to have the test again. This is not common, but it does happen from time to time. When should you call for help?   Watch closely for changes in your health, and be sure to contact your doctor if:    · You have vaginal bleeding or pain for more than 2 days after the test. It is normal to have a small amount of bleeding for a day or two after the test.   Where can you learn more? Go to http://www.gray.com/  Enter G753 in the search box to learn more about \"Pap Test: Care Instructions. \"  Current as of: September 8, 2021               Content Version: 13.2  © 2006-2022 [employer] Care instructions adapted under license by Isabella Oliver (which disclaims liability or warranty for this information). If you have questions about a medical condition or this instruction, always ask your healthcare professional. [address] any warranty or liability for your use of this information.

## 2022-05-16 RX ORDER — FLUCONAZOLE 150 MG/1
TABLET ORAL
Qty: 2 TABLET | Refills: 0 | Status: SHIPPED | OUTPATIENT
Start: 2022-05-16

## 2022-05-16 NOTE — PROGRESS NOTES
Vaginal swab positive for yeast infection. Start Diflucan 150 mg x1 dose, may repeat after 3 days if vaginal discharge persist.  PAP normal, repeat in 5 years. We are still waiting on the test for bacterial vaginosis and we will call her if that comes back positive. Thank you.

## 2022-05-17 LAB
BACTERIAL VAGINOSIS: NEGATIVE
C GLABRATA DNA VAG QL NAA+PROBE: NEGATIVE
CANDIDA DNA VAG QL NAA+PROBE: POSITIVE
SPECIMEN SOURCE: ABNORMAL
SPECIMEN SOURCE: NORMAL

## 2022-05-18 ENCOUNTER — TELEPHONE (OUTPATIENT)
Dept: FAMILY MEDICINE CLINIC | Age: 47
End: 2022-05-18

## 2022-05-27 ENCOUNTER — TELEPHONE (OUTPATIENT)
Dept: NEUROLOGY | Age: 47
End: 2022-05-27

## 2022-05-27 DIAGNOSIS — R20.0 NUMBNESS AND TINGLING OF RIGHT ARM: Primary | ICD-10-CM

## 2022-05-27 DIAGNOSIS — R20.2 NUMBNESS AND TINGLING OF RIGHT ARM: Primary | ICD-10-CM

## 2022-05-27 NOTE — TELEPHONE ENCOUNTER
Patient in office for scheduled appointment, per last visit follow up for TIA and right arm discomfort. Patient mentions EMG from last visit and worsening symptoms. Made aware EMG not to be done today.  NP to advise

## 2022-06-10 NOTE — ED NOTES
I have reviewed discharge instructions with the patient. The patient verbalized understanding.  Patient armband removed and shredded all other ROS negative except as per HPI

## 2022-06-20 DIAGNOSIS — Z86.73 HISTORY OF TIA (TRANSIENT ISCHEMIC ATTACK): ICD-10-CM

## 2022-06-20 DIAGNOSIS — E78.5 HYPERLIPIDEMIA LDL GOAL <70: ICD-10-CM

## 2022-06-20 RX ORDER — ATORVASTATIN CALCIUM 80 MG/1
TABLET, FILM COATED ORAL
Qty: 90 TABLET | Refills: 0 | Status: SHIPPED | OUTPATIENT
Start: 2022-06-20 | End: 2022-09-23

## 2022-06-20 NOTE — TELEPHONE ENCOUNTER
Refills approved. Needs to schedule 4-month follow up with fasting labs 1 week prior. Please call and advise patient. Thank you.

## 2022-07-28 ENCOUNTER — TELEPHONE (OUTPATIENT)
Dept: NEUROLOGY | Age: 47
End: 2022-07-28

## 2022-07-28 NOTE — TELEPHONE ENCOUNTER
The EMG [place] ordered for pt. The Dr. Tone Bertrand does not do EMG outside of carpal tunnel. We would have to send a EMG for Dr. Quentin Waller. Please Advise.

## 2022-09-23 DIAGNOSIS — I10 PRIMARY HYPERTENSION: ICD-10-CM

## 2022-09-23 DIAGNOSIS — E78.5 HYPERLIPIDEMIA LDL GOAL <70: ICD-10-CM

## 2022-09-23 DIAGNOSIS — Z86.73 HISTORY OF TIA (TRANSIENT ISCHEMIC ATTACK): ICD-10-CM

## 2022-09-23 RX ORDER — HYDROCHLOROTHIAZIDE 12.5 MG/1
CAPSULE ORAL
Qty: 90 CAPSULE | Refills: 1 | Status: SHIPPED | OUTPATIENT
Start: 2022-09-23

## 2022-09-23 RX ORDER — AMLODIPINE BESYLATE 10 MG/1
TABLET ORAL
Qty: 90 TABLET | Refills: 1 | Status: SHIPPED | OUTPATIENT
Start: 2022-09-23

## 2022-09-23 RX ORDER — ATORVASTATIN CALCIUM 80 MG/1
TABLET, FILM COATED ORAL
Qty: 90 TABLET | Refills: 0 | Status: SHIPPED | OUTPATIENT
Start: 2022-09-23

## 2022-09-23 NOTE — TELEPHONE ENCOUNTER
Refills approved. Due for follow up and fasting labs in 4 weeks. Please call to schedule and advise to complete fasting labs 1 week prior to appointment. Thanks!

## 2022-09-27 ENCOUNTER — HOSPITAL ENCOUNTER (OUTPATIENT)
Dept: LAB | Age: 47
Discharge: HOME | End: 2022-09-27
Payer: MEDICAID

## 2022-09-27 DIAGNOSIS — E78.5 HYPERLIPIDEMIA LDL GOAL <70: ICD-10-CM

## 2022-09-27 DIAGNOSIS — Z86.73 HISTORY OF TIA (TRANSIENT ISCHEMIC ATTACK): ICD-10-CM

## 2022-09-27 LAB
ALBUMIN SERPL-MCNC: 3.4 G/DL (ref 3.4–5)
ALBUMIN/GLOB SERPL: 1 {RATIO} (ref 0.8–1.7)
ALP SERPL-CCNC: 67 U/L (ref 45–117)
ALT SERPL-CCNC: 101 U/L (ref 13–56)
ANION GAP SERPL CALC-SCNC: 3 MMOL/L (ref 3–18)
AST SERPL-CCNC: 51 U/L (ref 10–38)
BILIRUB SERPL-MCNC: 0.5 MG/DL (ref 0.2–1)
BUN SERPL-MCNC: 9 MG/DL (ref 7–18)
BUN/CREAT SERPL: 17 (ref 12–20)
CALCIUM SERPL-MCNC: 8 MG/DL (ref 8.5–10.1)
CHLORIDE SERPL-SCNC: 109 MMOL/L (ref 100–111)
CHOLEST SERPL-MCNC: 98 MG/DL
CO2 SERPL-SCNC: 28 MMOL/L (ref 21–32)
CREAT SERPL-MCNC: 0.53 MG/DL (ref 0.6–1.3)
GLOBULIN SER CALC-MCNC: 3.5 G/DL (ref 2–4)
GLUCOSE SERPL-MCNC: 92 MG/DL (ref 74–99)
HDLC SERPL-MCNC: 56 MG/DL (ref 40–60)
HDLC SERPL: 1.8 {RATIO} (ref 0–5)
LDLC SERPL CALC-MCNC: 35.2 MG/DL (ref 0–100)
LIPID PROFILE,FLP: NORMAL
POTASSIUM SERPL-SCNC: 3.7 MMOL/L (ref 3.5–5.5)
PROT SERPL-MCNC: 6.9 G/DL (ref 6.4–8.2)
SODIUM SERPL-SCNC: 140 MMOL/L (ref 136–145)
TRIGL SERPL-MCNC: 34 MG/DL (ref ?–150)
VLDLC SERPL CALC-MCNC: 6.8 MG/DL

## 2022-09-27 PROCEDURE — 80053 COMPREHEN METABOLIC PANEL: CPT

## 2022-09-27 PROCEDURE — 36415 COLL VENOUS BLD VENIPUNCTURE: CPT

## 2022-09-27 PROCEDURE — 80061 LIPID PANEL: CPT

## 2022-10-17 NOTE — PROGRESS NOTES
Chief Complaint   Patient presents with    Hypertension    Cholesterol Problem    Labs    TIA     Assessment & Plan:     1. Elevated LFTs  Assessment & Plan:  Worsening, given contact info for GI, patient to schedule  Avoid Tylenol use, reduce ETOH intake  Orders:  -     METABOLIC PANEL, COMPREHENSIVE; Future  2. Hyperlipidemia LDL goal <70  Assessment & Plan:  LDL at goal, continue regimen for now until seen by GI for elevated LFTs  Repeat lipid panel in 3 mos  Orders:  -     LIPID PANEL; Future  -     METABOLIC PANEL, COMPREHENSIVE; Future  3. History of TIA (transient ischemic attack)  Assessment & Plan:  Continue statin, ASA  New referral to neurology ordered  Orders:  -     REFERRAL TO NEUROLOGY  4. Primary hypertension  Assessment & Plan:  BP at goal, <130/80, continue regimen  5. Encounter for screening mammogram for malignant neoplasm of breast  -     AROLDO MAMMO BI SCREENING INCL CAD; Future  6. Needs flu shot  -     INFLUENZA, FLUARIX, FLULAVAL, FLUZONE (AGE 6 MO+), AFLURIA(AGE 3Y+) IM, PF, 0.5 ML  7. Screen for colon cancer  Comments:  Given contact info for [facility] for scheduling  8. Encounter to discuss test results    Follow-up and Dispositions    Return in about 3 months (around 1/18/2023) for cholesterol, blood pressure, history of TIA, elevated liver enzymes (CMP, lipid).        Subjective:     HPI    Elevated LFTs-  Symptoms: None  ETOH: admits to drinking a bottle of wine twice a month  Risk factors: None  Imaging: None    Hyperlipidemia  Compliant with meds  Comorbid: hx of TIA, HTN  Current treatment: atorvastatin 80 mg    Hypertension-  Symptoms:  None  BP readings at home are 888S systolic  Comorbid: hx of TIA, HLD  Current treatment: amlodipine 10 mg daily, HCTZ 12.5 mg    Hx of TIA -  Has been having twitching of her face  Still has numbness of hands  Has blurry vision of left eye  Had an eye exam, was told normal exam  Was prescribed some glasses    Health Maintenance:  COVID-19 vac - due for booster  Flu vac - will give today  Colonoscopy -  previously referred, advised to schedule    Review of Systems   Constitutional:  Negative for chills, fever and malaise/fatigue. Eyes:  Positive for blurred vision. Respiratory:  Negative for shortness of breath. Cardiovascular:  Negative for chest pain. Objective:   /78 (BP 1 Location: Left upper arm, BP Patient Position: Sitting, BP Cuff Size: Adult)   Pulse 85   Temp 97.9 °F (36.6 °C) (Oral)   Resp 17   Ht 5' 5\" (1.651 m)   Wt 175 lb (79.4 kg)   SpO2 99%   BMI 29.12 kg/m²      Physical Exam  Vitals and nursing note reviewed. Constitutional:       General: She is not in acute distress. Appearance: She is not ill-appearing. HENT:      Head: Normocephalic and atraumatic. Mouth/Throat:      Mouth: Mucous membranes are moist.      Pharynx: Oropharynx is clear. Eyes:      Extraocular Movements: Extraocular movements intact. Conjunctiva/sclera: Conjunctivae normal.      Pupils: Pupils are equal, round, and reactive to light. Cardiovascular:      Rate and Rhythm: Normal rate and regular rhythm. Heart sounds: No murmur heard. No friction rub. No gallop. Pulmonary:      Effort: Pulmonary effort is normal. No respiratory distress. Breath sounds: No wheezing, rhonchi or rales. Musculoskeletal:      Cervical back: Neck supple. Skin:     General: Skin is warm and dry. Neurological:      General: No focal deficit present. Mental Status: She is alert and oriented to person, place, and time. Cranial Nerves: No cranial nerve deficit, dysarthria or facial asymmetry. Motor: No weakness, tremor or pronator drift. Coordination: Coordination is intact. Romberg sign negative. Coordination normal. Finger-Nose-Finger Test and Heel to Shin Test normal.      Gait: Gait and tandem walk normal.   Psychiatric:         Mood and Affect: Mood normal.         Thought Content:  Thought content normal. Judgment: Judgment normal.          Yolis Pride, FNP-C

## 2022-10-17 NOTE — ASSESSMENT & PLAN NOTE
LDL at goal, continue regimen for now until seen by GI for elevated LFTs  Repeat lipid panel in 3 mos

## 2022-10-18 ENCOUNTER — OFFICE VISIT (OUTPATIENT)
Dept: FAMILY MEDICINE CLINIC | Age: 47
End: 2022-10-18
Payer: MEDICAID

## 2022-10-18 VITALS
RESPIRATION RATE: 17 BRPM | SYSTOLIC BLOOD PRESSURE: 121 MMHG | TEMPERATURE: 97.9 F | HEART RATE: 85 BPM | HEIGHT: 65 IN | OXYGEN SATURATION: 99 % | DIASTOLIC BLOOD PRESSURE: 78 MMHG | WEIGHT: 175 LBS | BODY MASS INDEX: 29.16 KG/M2

## 2022-10-18 DIAGNOSIS — Z23 NEEDS FLU SHOT: ICD-10-CM

## 2022-10-18 DIAGNOSIS — Z71.2 ENCOUNTER TO DISCUSS TEST RESULTS: ICD-10-CM

## 2022-10-18 DIAGNOSIS — Z12.11 SCREEN FOR COLON CANCER: ICD-10-CM

## 2022-10-18 DIAGNOSIS — E78.5 HYPERLIPIDEMIA LDL GOAL <70: ICD-10-CM

## 2022-10-18 DIAGNOSIS — Z86.73 HISTORY OF TIA (TRANSIENT ISCHEMIC ATTACK): ICD-10-CM

## 2022-10-18 DIAGNOSIS — Z12.31 ENCOUNTER FOR SCREENING MAMMOGRAM FOR MALIGNANT NEOPLASM OF BREAST: ICD-10-CM

## 2022-10-18 DIAGNOSIS — R79.89 ELEVATED LFTS: Primary | ICD-10-CM

## 2022-10-18 DIAGNOSIS — I10 PRIMARY HYPERTENSION: ICD-10-CM

## 2022-10-18 PROCEDURE — 90686 IIV4 VACC NO PRSV 0.5 ML IM: CPT | Performed by: NURSE PRACTITIONER

## 2022-10-18 PROCEDURE — 99214 OFFICE O/P EST MOD 30 MIN: CPT | Performed by: NURSE PRACTITIONER

## 2022-10-18 NOTE — PROGRESS NOTES
Obtained consent from patient. Per verbal order from ISAAK Borges Injection of FLU regular  administered. Verified by me and ISAAK Borges that this is the correct immunization/injection. Patient observed for 15 minutes with no adverse reaction.

## 2022-10-18 NOTE — PROGRESS NOTES
Neftali Schmitt presents today for   Chief Complaint   Patient presents with    Hypertension    Cholesterol Problem    Labs    TIA       Is someone accompanying this pt? no    Is the patient using any DME equipment during OV? no    Depression Screening:  3 most recent PHQ Screens 10/18/2022   Little interest or pleasure in doing things Not at all   Feeling down, depressed, irritable, or hopeless Not at all   Total Score PHQ 2 0       Learning Assessment:  Learning Assessment 4/5/2022   PRIMARY LEARNER Patient   HIGHEST LEVEL OF EDUCATION - PRIMARY LEARNER  GRADUATED HIGH SCHOOL OR GED   BARRIERS PRIMARY LEARNER NONE   CO-LEARNER CAREGIVER No   PRIMARY LANGUAGE ENGLISH    NEED -   LEARNER PREFERENCE PRIMARY DEMONSTRATION   ANSWERED BY patient    RELATIONSHIP SELF       Fall Risk  No flowsheet data found. ADL  No flowsheet data found. Travel Screening:    Travel Screening       Question Response    In the last 10 days, have you been in contact with someone who was confirmed or suspected to have Coronavirus/COVID-19? No / Unsure    Have you had a COVID-19 viral test in the last 10 days? No    Do you have any of the following new or worsening symptoms? None of these    Have you traveled internationally or domestically in the last month? No          Travel History   Travel since 09/18/22    No documented travel since 09/18/22         Health Maintenance reviewed and discussed and ordered per Provider. Health Maintenance Due   Topic Date Due    COVID-19 Vaccine (3 - Booster for Moderna series) 01/08/2022    Colorectal Cancer Screening Combo  Never done    Flu Vaccine (1) 08/01/2022   . Coordination of Care:  1. Have you been to the ER, urgent care clinic since your last visit? Hospitalized since your last visit? no    2. Have you seen or consulted any other health care providers outside of the [address] [place] [address] since your last visit? Include any pap smears or colon screening. no

## 2022-11-02 ENCOUNTER — VIRTUAL VISIT (OUTPATIENT)
Dept: FAMILY MEDICINE CLINIC | Age: 47
End: 2022-11-02
Payer: MEDICAID

## 2022-11-02 ENCOUNTER — TELEPHONE (OUTPATIENT)
Dept: FAMILY MEDICINE CLINIC | Age: 47
End: 2022-11-02

## 2022-11-02 DIAGNOSIS — B34.9 VIRAL SYNDROME: Primary | ICD-10-CM

## 2022-11-02 PROCEDURE — 99213 OFFICE O/P EST LOW 20 MIN: CPT | Performed by: NURSE PRACTITIONER

## 2022-11-02 RX ORDER — BENZONATATE 100 MG/1
100 CAPSULE ORAL
Qty: 21 CAPSULE | Refills: 0 | Status: SHIPPED | OUTPATIENT
Start: 2022-11-02 | End: 2022-11-09

## 2022-11-02 NOTE — LETTER
NOTIFICATION RETURN TO WORK / SCHOOL    11/2/2022 3:26 PM    Ms. Danii Tomlin  [address] [place] [phone]      To Whom It May Concern:    Danii Tomlin is currently under the care of Kelly Maxwell. She was seen and evaluated on 11/02/2022 for an unspecified viral illness. It is recommended that the patient stay home until 11/07/2022 and until fever-free for 24 hours without the use of Tylenol or Ibuprofen. If there are questions or concerns please have the patient contact our office.         Sincerely,      Gianna Garcia NP

## 2022-11-02 NOTE — PROGRESS NOTES
Lord Pepe presents today for   Chief Complaint   Patient presents with    Sore Throat     Sore throat started this morning and she been taking some cough medicine. Patient states that she also has a dry cough and it hurts. Light fever started yesterday 101.0 however today temperature is normal.        Virtual/telephone visit    Depression Screening:  3 most recent PHQ Screens 11/2/2022   Little interest or pleasure in doing things Not at all   Feeling down, depressed, irritable, or hopeless Not at all   Total Score PHQ 2 0       Learning Assessment:  Learning Assessment 4/5/2022   PRIMARY LEARNER Patient   HIGHEST LEVEL OF EDUCATION - PRIMARY LEARNER  GRADUATED HIGH SCHOOL OR GED   BARRIERS PRIMARY LEARNER NONE   CO-LEARNER CAREGIVER No   PRIMARY LANGUAGE ENGLISH    NEED -   LEARNER PREFERENCE PRIMARY DEMONSTRATION   ANSWERED BY patient    RELATIONSHIP SELF       Fall Risk  No flowsheet data found. ADL  No flowsheet data found. Travel Screening:    Travel Screening       Question Response    In the last 10 days, have you been in contact with someone who was confirmed or suspected to have Coronavirus/COVID-19? No / Unsure    Have you had a COVID-19 viral test in the last 10 days? No    Do you have any of the following new or worsening symptoms? None of these    Have you traveled internationally or domestically in the last month? No          Travel History   Travel since 10/02/22    No documented travel since 10/02/22         Health Maintenance reviewed and discussed and ordered per Provider. Health Maintenance Due   Topic Date Due    COVID-19 Vaccine (3 - Booster for Moderna series) 10/03/2021    Colorectal Cancer Screening Combo  Never done   . Coordination of Care:    1. Have you been to the ER, urgent care clinic since your last visit? Hospitalized since your last visit? no    2.  Have you seen or consulted any other health care providers outside of the [facility] since your last visit? Include any pap smears or colon screening.  no

## 2022-11-02 NOTE — TELEPHONE ENCOUNTER
Patient is requesting a note for work for the past 2 days. Patient has had a sore throat.   Do you need to see her 1st? Virtual ?  Please advise

## 2022-11-02 NOTE — PROGRESS NOTES
Анна Peter is a 41 y.o. female who was seen by synchronous (real-time) audio-video technology on 11/2/2022 for Sore Throat (Sore throat started this morning and she been taking some cough medicine. Patient states that she also has a dry cough and it hurts. Light fever started yesterday 101.0 however today temperature is normal. )    Assessment & Plan:     1. Viral syndrome  Comments:  COVID vs Flu, advised to go to  to get swabbed  Tessalon Perles in the meantime, continue hydration  Orders:  -     benzonatate (TESSALON) 100 mg capsule; Take 1 Capsule by mouth three (3) times daily as needed for Cough for up to 7 days. , Normal, Disp-21 Capsule, R-0     Follow-up and Dispositions    Return in 3 months (on 1/24/2023) for blood pressure, cholesterol, history of TIA, elevated liver enzymes, lab results (CMP, lipid). SUBJECTIVE:     HPI    Sore Throat -  Onset:  Yesterday  Cough is dry and painful  Also has runny nose but has improved  Has fatigue and nausea, denies any body aches or diarrhea  Fever yesterday at 101, none today  She was sent home from work   Has been taking Dayquil and some Motrin for fever  No chest pains or shortness of breath      Review of Systems   Constitutional:  Positive for chills, fever and malaise/fatigue. HENT:  Positive for sore throat. Respiratory:  Positive for cough. Negative for sputum production and shortness of breath. Cardiovascular:  Negative for chest pain. Gastrointestinal:  Positive for nausea. Negative for diarrhea and vomiting. Musculoskeletal:  Negative for myalgias. Neurological:  Negative for dizziness and headaches. OBJECTIVE:     Physical Exam     Constitutional: Stable-appearing, in no distress, alert and oriented  HENT:   Head: Normocephalic and atraumatic. Ears:  Hearing grossly intact. Mouth:  No visible perioral lesions, cyanosis, or lip swelling.   Pulmonary/Chest: Does not appear dyspneic, no audible wheezes or nasal flaring. Musculoskeletal: Grossly normal active ROM in upper extremities. Neurological:  Intact recent memory, no facial or eyelid drooping, no speech impairment, answering questions appropriately. Psychiatric: Judgment and insight good, normal mood and affect. Matthew Saucedo, was evaluated through a synchronous (real-time) audio-video encounter. The patient (or guardian if applicable) is aware that this is a billable service, which includes applicable co-pays. This Virtual Visit was conducted with patient's (and/or legal guardian's) consent. The visit was conducted pursuant to the emergency declaration under the [address] [place] [address], [address] [place] [address] waiver authority and the [employer] General Act. Patient identification was verified, and a caregiver was present when appropriate. The patient was located at: Home: [address] [place] [phone]  The provider was located at:  Facility (Appt Department): [address] [place] [address]    FEDERICA Sanz

## 2022-11-08 ENCOUNTER — TRANSCRIBE ORDER (OUTPATIENT)
Dept: SCHEDULING | Age: 47
End: 2022-11-08

## 2022-11-08 ENCOUNTER — HOSPITAL ENCOUNTER (OUTPATIENT)
Dept: MAMMOGRAPHY | Age: 47
Discharge: HOME | End: 2022-11-08
Attending: NURSE PRACTITIONER
Payer: MEDICAID

## 2022-11-08 DIAGNOSIS — R79.89 ELEVATED LFTS: ICD-10-CM

## 2022-11-08 DIAGNOSIS — G45.9 TIA (TRANSIENT ISCHEMIC ATTACK): Primary | ICD-10-CM

## 2022-11-08 DIAGNOSIS — Z12.31 ENCOUNTER FOR SCREENING MAMMOGRAM FOR MALIGNANT NEOPLASM OF BREAST: ICD-10-CM

## 2022-11-08 DIAGNOSIS — Z12.11 COLON CANCER SCREENING: ICD-10-CM

## 2022-11-08 PROCEDURE — 77067 SCR MAMMO BI INCL CAD: CPT

## 2022-11-09 ENCOUNTER — TELEPHONE (OUTPATIENT)
Dept: FAMILY MEDICINE CLINIC | Age: 47
End: 2022-11-09

## 2022-11-09 NOTE — TELEPHONE ENCOUNTER
Patient was made aware of results    ----- Message from Petey Hough NP sent at 11/8/2022 10:54 AM EST -----  Mammogram normal, we will repeat in 1 year.

## 2022-11-29 ENCOUNTER — HOSPITAL ENCOUNTER (OUTPATIENT)
Dept: ULTRASOUND IMAGING | Age: 47
Discharge: HOME | End: 2022-11-29
Payer: MEDICAID

## 2022-11-29 DIAGNOSIS — R79.89 ELEVATED LFTS: ICD-10-CM

## 2022-11-29 DIAGNOSIS — G45.9 TIA (TRANSIENT ISCHEMIC ATTACK): ICD-10-CM

## 2022-11-29 DIAGNOSIS — Z12.11 COLON CANCER SCREENING: ICD-10-CM

## 2022-11-29 PROCEDURE — 76705 ECHO EXAM OF ABDOMEN: CPT

## 2022-12-08 ENCOUNTER — TRANSCRIBE ORDER (OUTPATIENT)
Dept: SCHEDULING | Age: 47
End: 2022-12-08

## 2022-12-08 DIAGNOSIS — R79.89 HYPOURICEMIA: Primary | ICD-10-CM

## 2022-12-27 ENCOUNTER — HOSPITAL ENCOUNTER (OUTPATIENT)
Dept: ULTRASOUND IMAGING | Age: 47
Discharge: HOME | End: 2022-12-27
Payer: MEDICAID

## 2022-12-27 DIAGNOSIS — R79.89 HYPOURICEMIA: ICD-10-CM

## 2022-12-27 PROCEDURE — 76981 USE PARENCHYMA: CPT

## 2022-12-30 DIAGNOSIS — E78.5 HYPERLIPIDEMIA LDL GOAL <70: ICD-10-CM

## 2022-12-30 DIAGNOSIS — Z86.73 HISTORY OF TIA (TRANSIENT ISCHEMIC ATTACK): ICD-10-CM

## 2022-12-30 RX ORDER — ATORVASTATIN CALCIUM 80 MG/1
TABLET, FILM COATED ORAL
Qty: 30 TABLET | Refills: 0 | Status: SHIPPED | OUTPATIENT
Start: 2022-12-30

## 2023-01-11 ENCOUNTER — OFFICE VISIT (OUTPATIENT)
Dept: NEUROLOGY | Age: 48
End: 2023-01-11
Payer: MEDICAID

## 2023-01-11 VITALS
RESPIRATION RATE: 18 BRPM | WEIGHT: 166 LBS | DIASTOLIC BLOOD PRESSURE: 80 MMHG | SYSTOLIC BLOOD PRESSURE: 124 MMHG | HEART RATE: 72 BPM | HEIGHT: 65 IN | OXYGEN SATURATION: 97 % | BODY MASS INDEX: 27.66 KG/M2

## 2023-01-11 DIAGNOSIS — R20.0 NUMBNESS AND TINGLING OF RIGHT ARM: ICD-10-CM

## 2023-01-11 DIAGNOSIS — R20.0 NUMBNESS OF FACE: ICD-10-CM

## 2023-01-11 DIAGNOSIS — Z86.73 HISTORY OF TRANSIENT ISCHEMIC ATTACK (TIA): ICD-10-CM

## 2023-01-11 DIAGNOSIS — Z86.73 HISTORY OF TRANSIENT ISCHEMIC ATTACK (TIA): Primary | ICD-10-CM

## 2023-01-11 DIAGNOSIS — G51.32 HEMIFACIAL SPASM OF LEFT SIDE OF FACE: ICD-10-CM

## 2023-01-11 DIAGNOSIS — R20.2 NUMBNESS AND TINGLING OF RIGHT ARM: ICD-10-CM

## 2023-01-11 NOTE — PATIENT INSTRUCTIONS
Patient instructions:  -MRI brain and EEG (scheduling will call you)  -please have labs drawn  -EMG/nerve conduction study  -follow up in around 2-3 months, after testing

## 2023-01-11 NOTE — PROGRESS NOTES
[address] [address]. [facility], [address]  Office:  [phone]  Fax: [phone]  Chief Complaint   Patient presents with    Follow-up       HPI: Krystal Middleton presents in follow-up for history of TIA. She was last seen here on 11/30/2021 by ISAAK Mcmahon which was in hospital follow-up for TIA. History per prior records and documentation indicated that she went to [facility] ER on 10/29/2021 for left facial weakness and lightheadedness. She was seen by neurology in the hospital and was noted that she had drooping of the left face with some numbness, also involvement of the left upper extremity with weakness and some tremor, and also had some blurring of vision on the left side. Symptoms resolved within 10 minutes. She also had symptoms later on which resolved within 10 minutes. It was noted that on arrival to the ER BP was as high as 191/119. MRI brain negative for acute findings. MR angiogram of the brain unremarkable. Carotid ultrasound unremarkable. She was discharged on dual antiplatelet therapy with aspirin 81 mg and Plavix 75 mg for 3 weeks. She has vascular risk factors including hypertension and hyperlipidemia. Denied history of smoking or alcohol abuse. Smokes marijuana occasionally. Family history included possible stroke in her grandmother, mother with diabetes and hypertension. Lipid panel in the hospitalization showed total cholesterol 237 and LDL-C 166.2. Hemoglobin A1c was normal.  She was started on antihypertensive medication. TTE reported no shunting at baseline or with Valsalva. Estimated LVEF 55 to 60%. At the last visit here with ISAAK Lund it was noted that she mentioned left-sided facial twitching that has occurred for different instances on 4 separate days since leaving the hospital, symptoms lasting 2 minutes, and also an incident of right arm tingling that lasted 3 minutes.   She had not had an eye exam so she was referred to ophthalmology for an eye exam.    She presents today in follow-up. Reports still having left side face symptoms. Endorses having symptoms all the while since prior visit for TIA in 2021. Had issues with insurance so she now presented again. Left side face spasms. Reports it may occur when getting upset. Or if looking up, or if moving too fast.  She can also have associated paresthesias in the right arm where it feels tingly and fingers get numb. That happens when the left side of the face gets tight. Reports the nurse noticed the face symptoms on the way walking to the room. The patient reports that the left side her face droops down, the whole side of the face. It makes her feel tired after. If she is at home, she has to lay down till her face straightens up. There can be associated discomfort when trying to straighten it, but not pain. She tries to straighten it up. It lasts at least 2 minutes. The last 1 prior to today was 2 weeks ago. Her fiancé tells her not to stress. It can happen if she gets overly excited, or moving too fast.  She has to take time getting up walking to the kitchen, not to get up too fast.  Prior to 2 weeks ago, and happened about a month before that. At that time, she could not drive, son was in the car with her, lasted about 3 minutes at that time. Sat there and rested then drove home. [place] exhausted after. No left arm or left leg issues. Sometimes can be home and right arm can start tingling all the way to her fingers and they get numb but not the left face issue. They can happen independently of each other. The right arm issue occurs almost daily; tingling, denies it being position related. She does not wake with it. It occurs throughout the day, but sometimes wakes with numbness and tingling related to position but usually it occurs when just watching TV. The right arm does not hurt. No neck pain.   Admits to feeling off balance, says she stumbles a lot, which has been ever since the TIA. BP controlled in office, 124/80. She is on aspirin 81 mg. She is on atorvastatin. She follows with her PCP NP. Lipid panel was controlled in September. She is getting lab work done on the 24th to recheck hepatic enzymes. She is having monitoring for elevated hepatic enzymes, had an ultrasound in November and December. She will see GI on February 15. She was drinking wine but cut back. On 2 weekends of the month she would have 2 bottles of wine. Endorses drinking wine at that quantity at the time of the TIA. No use during the week. Endorses smoking marijuana. Denies other weakness, speech changes, or slurred speech during the episodes. She feels tired after the episodes. No headache during the episodes. She does not get headaches. No pain today. Today it occurred in the left face, not the arm. Her son had epilepsy from the ages of 1-16 but reports he outgrew it and was able to come off the medication, he is 12 now. She saw ophthalmologist Dr. Shannan Rivero about 2 months ago and received a prescription for glasses. Reports visual changes of the left eye when the left facial symptoms occurs, cannot see out of it with blurriness of the vision when 'it drops down' (referring to left facial tightness/spasm). TSH normal in 2019. CMP in September 2022 showed calcium low (8.0), ALT elevated (101), and AST elevated (51). Past Medical History:   Diagnosis Date    Breast cancer screening by mammogram 08/16/2019    Breast biopsy benign. May resume routine annual screenings.     Hypercholesterolemia     Hypertension     TIA (transient ischemic attack)        Past Surgical History:   Procedure Laterality Date    HX BREAST BIOPSY  6/2006    Right-benign    HX OOPHORECTOMY      HX OTHER SURGICAL  1/2013    left neck cyst removal    HX OTHER SURGICAL Right 03/2017    Pelvic surgery possibly ovary       Current Outpatient Medications   Medication Sig Dispense Refill    atorvastatin (LIPITOR) 80 mg tablet TAKE ONE TABLET BY MOUTH ONCE NIGHTLY 30 Tablet 0    amLODIPine (NORVASC) 10 mg tablet TAKE ONE TABLET BY MOUTH DAILY 90 Tablet 1    hydroCHLOROthiazide (MICROZIDE) 12.5 mg capsule TAKE ONE CAPSULE BY MOUTH DAILY FOR HIGH BLOOD PRESSURE 90 Capsule 1    aspirin delayed-release 81 mg tablet Take 162 mg by mouth daily. No Known Allergies    Social History     Tobacco Use    Smoking status: Former     Packs/day: 0.50     Years: 10.00     Pack years: 5.00     Types: Cigarettes     Quit date: 7/3/2017     Years since quittin.5    Smokeless tobacco: Never   Vaping Use    Vaping Use: Never used   Substance Use Topics    Alcohol use: Yes     Alcohol/week: 6.0 standard drinks     Types: 6 Glasses of wine per week     Comment: light    Drug use: Yes     Types: Marijuana       Family History   Problem Relation Age of Onset    Hypertension Mother     Diabetes Mother     Hypertension Father     Heart Disease Father     Stroke Maternal Grandmother     Lung Cancer Maternal Grandmother        Review of Systems:  GENERAL: Denies fever or fatigue  CARDIAC: No CP or SOB  PULMONARY: No cough or SOB  MUSCULOSKELETAL: No new joint pain  NEURO: SEE HPI    Physical Examination:  Visit Vitals  /80   Pulse 72   Resp 18   Ht 5' 5\" (1.651 m)   Wt 75.3 kg (166 lb)   SpO2 97%   BMI 27.62 kg/m²       Alert, in NAD. Heart is regular. Oriented x3. Speech is fluent. Speech clear. EOMs are full, PERRL, VFFTC, no nystagmus. No facial asymmetry. Facial sensation is reduced on the left side throughout to light touch and pinprick, about 50% compared to the right side. Tongue is midline. Strength and tone are normal. No drift of the bilateral upper extremities. Fine finger movements symmetrical. FNF intact bilaterally. Light touch and pinprick intact to the bilateral upper and lower extremities. DTRs +2. Cevallos's negative bilaterally.   On Tinel's test at the wrists she gets a sharp pain on the left radiating up the whole left arm to the face, also symptoms on the right but more mild. On Phalen test she gets numbness of the right second digit. Gait is normal, and she can heel walk, toe walk, and tandem walk. Impression/Plan: This is a 56-year-old right-handed female who presents in follow-up. She had been seen in the hospital in neurology consultation for possible TIA in October 2021; at that, it was noted that she had a transient episode of left facial droop and left upper extremity weakness. Noted at that time to have left lower face drooping and numbness, also involvement of the left upper extremity with weakness and some tremor. Symptoms resolved in 10 minutes at that time. BP was elevated. MRI did not show any acute changes. She has been on aspirin 81 mg and statin since that time. She had a post hospital follow-up visit in November 2021 and the left facial symptoms and right upper extremity symptoms were noted at that time. Currently she still experiences the intermittent left facial spasms described as tightening which occurs with or without right arm paresthesias and fatigue after. Fatigue lasts about 10 minutes where she feels like her energy is gone and has to rest.  Symptoms include left face numbness and right arm tingling. Can occur independent of eachother. Will obtain MRI brain with and without contrast with dedicated cranial nerve imaging. Obtain EEG.  EMG/NCS of the upper extremities for right upper extremity tingling episodes. Will obtain labs for vitamin B12 level, TSH, acetylcholine antibodies. Considerations include hemifacial spasms, epileptic, tingling related to CTS, possible psychogenic component, other. Advised to continue aspirin (endorses adequate supply), statin, vascular risk factor management including BP and lipid control. Encouraged to avoid excess alcohol. Discussed with Dr. Ruthie Burgess after the visit- will obtain the above studies including MRI, EEG, EMG/NCS. Follow up in around 2-3 months, after testing. Diagnoses and all orders for this visit:    1. History of transient ischemic attack (TIA)  -     EEG AWAKE AND ASLEEP; Future  -     MRI BRAIN W WO CONT; Future    2. Hemifacial spasm of left side of face  -     ACETYLCHOLINE RECEPTOR PANEL; Future  -     EEG AWAKE AND ASLEEP; Future  -     MRI BRAIN W WO CONT; Future  -     VITAMIN B12 & FOLATE; Future  -     TSH AND FREE T4; Future    3. Numbness of face  -     EEG AWAKE AND ASLEEP; Future  -     MRI BRAIN W WO CONT; Future  -     VITAMIN B12 & FOLATE; Future  -     TSH AND FREE T4; Future    4. Numbness and tingling of right arm  -     EMG TWO EXTREMITIES UPPER; Future  -     EEG AWAKE AND ASLEEP; Future  -     MRI BRAIN W WO CONT; Future  -     VITAMIN B12 & FOLATE; Future  -     TSH AND FREE T4; Future    Total time 45 minutes with 25 minutes spent in counseling. Signed By: Sangita Santiago NP        PLEASE NOTE:   Portions of this document may have been produced using voice recognition software. Unrecognized errors in transcription may be present.

## 2023-01-11 NOTE — LETTER
NOTIFICATION RETURN TO WORK / SCHOOL    1/11/2023 11:41 AM    Ms. Charles Roberts  [address] [place] [id number]      To Whom It May Concern:    Charles Roberts is currently under the care of Jim Lang. She was seen in the office today, on 1/11/2023. If there are questions or concerns please have the patient contact our office.         Sincerely,        Angie Snider NP

## 2023-01-16 ENCOUNTER — HOSPITAL ENCOUNTER (OUTPATIENT)
Dept: LAB | Age: 48
Discharge: HOME | End: 2023-01-16

## 2023-01-16 LAB — XX-LABCORP SPECIMEN COL,LCBCF: NORMAL

## 2023-01-16 PROCEDURE — 99001 SPECIMEN HANDLING PT-LAB: CPT

## 2023-01-26 NOTE — PROGRESS NOTES
Chief Complaint   Patient presents with    Cholesterol Problem    Hypertension    TIA    Foot Pain     Patient states that both her feet are achy. Patient states that her right foot have a knot at the bottom. She states that she is unable to even wear regular shoes. Patient requesting referral to Podiatry. She states that this been ongoing for 2 weeks and it hurts to walk and stand for a long period of time. She states that right foot is the worse foot. Pain rate 8/10. Assessment & Plan:     1. Hyperlipidemia LDL goal <70  Assessment & Plan:  LDL at goal in October, watch LFTs closely while on statin  Repeat lipid panel as planned in 2 mos  Not due for refill until March  Orders:  -     METABOLIC PANEL, COMPREHENSIVE; Future  -     LIPID PANEL; Future  2. Elevated LFTs  Assessment & Plan:  Labs not processed by the lab, given hepatic function panel order for today, patient to complete this week  Orders:  -     HEPATIC FUNCTION PANEL; Future  3. Primary hypertension  Assessment & Plan:  BP manual recheck improved, continue regimen  Not due for refill until March  4. History of TIA (transient ischemic attack)  Assessment & Plan:  Now followed by neurology, continue statin, ASA  Watch LFTs closely while on statin  5. Pain in both feet  Comments: Worsening, consult podiatry  Orders:  -     REFERRAL TO PODIATRY    Follow-up and Dispositions    Return in about 2 months (around 3/30/2023) for cholesterol, elevated liver enzymes, blood pressure, lab results (CMP, lipid).        Subjective:     HPI    Elevated LFTs-  Symptoms: None  ETOH: admits to drinking a bottle of wine twice a month  Risk factors: on high dose of atorvastatin for hx of TIA  Imaging: None     Hyperlipidemia  Compliant with meds  Comorbid: hx of TIA, HTN  Current treatment: atorvastatin 80 mg     Hypertension-  Symptoms:  None  BP readings at home are 281V systolic  Comorbid: hx of TIA, HLD  Current treatment: amlodipine 10 mg daily, HCTZ 12.5 mg Foot Pain -  Onset:  2 weeks ago  Associated symptoms:  knot on bottom of right foot  Unable to wear regular shoes  Hurts with walking and prolonged standing  Requesting referral to podiatry  Pain rating 8/10    Health Maintenance:  COVID-19 vac - due for booster  Colonoscopy -  previously referred, advised to schedule    Review of Systems   Constitutional:  Negative for chills, fever and malaise/fatigue. Respiratory:  Negative for shortness of breath. Cardiovascular:  Negative for chest pain. Musculoskeletal:  Positive for joint pain. Objective:   /70 Comment: manually  Pulse 71   Temp 97.8 °F (36.6 °C) (Oral)   Resp 16   Ht 5' 5\" (1.651 m)   Wt 167 lb (75.8 kg)   BMI 27.79 kg/m²      Physical Exam  Vitals and nursing note reviewed. Constitutional:       General: She is not in acute distress. Appearance: She is not ill-appearing. HENT:      Head: Normocephalic and atraumatic. Cardiovascular:      Rate and Rhythm: Normal rate and regular rhythm. Pulses:           Dorsalis pedis pulses are 2+ on the right side and 2+ on the left side. Pulmonary:      Effort: Pulmonary effort is normal. No respiratory distress. Breath sounds: No wheezing, rhonchi or rales. Musculoskeletal:      Right foot: Normal range of motion. No deformity. Left foot: Normal range of motion. No deformity. Feet:      Right foot:      Skin integrity: No ulcer, blister or erythema. Left foot:      Skin integrity: No blister or erythema. Skin:     General: Skin is warm and dry. Neurological:      General: No focal deficit present. Mental Status: She is alert and oriented to person, place, and time. Psychiatric:         Mood and Affect: Mood normal.         Thought Content:  Thought content normal.         Judgment: Judgment normal.        Brent Hyde, TATIP-C no

## 2023-01-30 ENCOUNTER — OFFICE VISIT (OUTPATIENT)
Dept: FAMILY MEDICINE CLINIC | Age: 48
End: 2023-01-30
Payer: MEDICAID

## 2023-01-30 VITALS
DIASTOLIC BLOOD PRESSURE: 70 MMHG | RESPIRATION RATE: 16 BRPM | SYSTOLIC BLOOD PRESSURE: 101 MMHG | HEART RATE: 71 BPM | BODY MASS INDEX: 27.82 KG/M2 | WEIGHT: 167 LBS | HEIGHT: 65 IN | TEMPERATURE: 97.8 F

## 2023-01-30 DIAGNOSIS — M79.672 PAIN IN BOTH FEET: ICD-10-CM

## 2023-01-30 DIAGNOSIS — R79.89 ELEVATED LFTS: ICD-10-CM

## 2023-01-30 DIAGNOSIS — E78.5 HYPERLIPIDEMIA LDL GOAL <70: Primary | ICD-10-CM

## 2023-01-30 DIAGNOSIS — M79.671 PAIN IN BOTH FEET: ICD-10-CM

## 2023-01-30 DIAGNOSIS — Z86.73 HISTORY OF TIA (TRANSIENT ISCHEMIC ATTACK): ICD-10-CM

## 2023-01-30 DIAGNOSIS — I10 PRIMARY HYPERTENSION: ICD-10-CM

## 2023-01-30 PROCEDURE — 3074F SYST BP LT 130 MM HG: CPT | Performed by: NURSE PRACTITIONER

## 2023-01-30 PROCEDURE — 3078F DIAST BP <80 MM HG: CPT | Performed by: NURSE PRACTITIONER

## 2023-01-30 PROCEDURE — 99214 OFFICE O/P EST MOD 30 MIN: CPT | Performed by: NURSE PRACTITIONER

## 2023-01-30 NOTE — PROGRESS NOTES
Charles Roberts presents today for   Chief Complaint   Patient presents with    Cholesterol Problem    Hypertension    TIA    Foot Pain     Patient states that both her feet are achy. Patient states that her right foot have a knot at the bottom. She states that she is unable to even wear regular shoes. Patient requesting referral to Podiatry. She states that this been ongoing for 2 weeks and it hurts to walk and stand for a long period of time. She states that right foot is the worse foot. Pain rate 8/10. Is someone accompanying this pt? no    Is the patient using any DME equipment during [address]? no    Depression Screening:  3 most recent PHQ Screens 1/30/2023   Little interest or pleasure in doing things Not at all   Feeling down, depressed, irritable, or hopeless Not at all   Total Score PHQ 2 0       Learning Assessment:  Learning Assessment 1/30/2023   PRIMARY LEARNER Patient   HIGHEST LEVEL OF EDUCATION - PRIMARY LEARNER  GRADUATED HIGH SCHOOL OR GED   [address] PRIMARY LEARNER [address] CAREGIVER -   CO-LEARNER NAME no   PRIMARY LANGUAGE ENGLISH    NEED -   LEARNER PREFERENCE PRIMARY DEMONSTRATION   ANSWERED BY patient   RELATIONSHIP SELF       Fall Risk  No flowsheet data found. ADL  No flowsheet data found. Travel Screening:    Travel Screening       Question Response    In the last 10 days, have you been in contact with someone who was confirmed or suspected to have Coronavirus/COVID-19? No / Unsure    Have you had a COVID-19 viral test in the last 10 days? No    Do you have any of the following new or worsening symptoms? None of these    Have you traveled internationally or domestically in the last month? No          Travel History   Travel since 12/30/22    No documented travel since 12/30/22         Health Maintenance reviewed and discussed and ordered per Provider.     Health Maintenance Due   Topic Date Due    COVID-19 Vaccine (3 - Booster for Moderna series) 10/03/2021 Colorectal Cancer Screening Combo  Never done   . Coordination of Care:  1. Have you been to the ER, urgent care clinic since your last visit? Hospitalized since your last visit? no    2. Have you seen or consulted any other health care providers outside of the [address] [place] [address] since your last visit? Include any pap smears or colon screening.  no

## 2023-01-30 NOTE — ASSESSMENT & PLAN NOTE
Labs not processed by the lab, given hepatic function panel order for today, patient to complete this week

## 2023-01-30 NOTE — ASSESSMENT & PLAN NOTE
LDL at goal in October, watch LFTs closely while on statin  Repeat lipid panel as planned in 2 mos  Not due for refill until March

## 2023-02-05 DIAGNOSIS — E78.5 HYPERLIPIDEMIA LDL GOAL <70: Primary | ICD-10-CM

## 2023-02-06 ENCOUNTER — HOSPITAL ENCOUNTER (EMERGENCY)
Age: 48
Discharge: HOME | End: 2023-02-06
Attending: EMERGENCY MEDICINE
Payer: MEDICAID

## 2023-02-06 VITALS
HEIGHT: 65 IN | HEART RATE: 89 BPM | RESPIRATION RATE: 18 BRPM | TEMPERATURE: 98.2 F | OXYGEN SATURATION: 100 % | WEIGHT: 175 LBS | BODY MASS INDEX: 29.16 KG/M2 | DIASTOLIC BLOOD PRESSURE: 86 MMHG | SYSTOLIC BLOOD PRESSURE: 125 MMHG

## 2023-02-06 DIAGNOSIS — R11.2 NAUSEA AND VOMITING, UNSPECIFIED VOMITING TYPE: Primary | ICD-10-CM

## 2023-02-06 LAB
ALBUMIN SERPL-MCNC: 3.8 G/DL (ref 3.4–5)
ALBUMIN/GLOB SERPL: 0.7 (ref 0.8–1.7)
ALP SERPL-CCNC: 100 U/L (ref 45–117)
ALT SERPL-CCNC: 64 U/L (ref 13–56)
ANION GAP SERPL CALC-SCNC: 6 MMOL/L (ref 3–18)
AST SERPL-CCNC: 24 U/L (ref 10–38)
BASOPHILS # BLD: 0 K/UL (ref 0–0.1)
BASOPHILS NFR BLD: 0 % (ref 0–2)
BILIRUB SERPL-MCNC: 0.7 MG/DL (ref 0.2–1)
BUN SERPL-MCNC: 17 MG/DL (ref 7–18)
BUN/CREAT SERPL: 23 (ref 12–20)
CALCIUM SERPL-MCNC: 9.3 MG/DL (ref 8.5–10.1)
CHLORIDE SERPL-SCNC: 105 MMOL/L (ref 100–111)
CO2 SERPL-SCNC: 26 MMOL/L (ref 21–32)
CREAT SERPL-MCNC: 0.74 MG/DL (ref 0.6–1.3)
DIFFERENTIAL METHOD BLD: ABNORMAL
EOSINOPHIL # BLD: 0 K/UL (ref 0–0.4)
EOSINOPHIL NFR BLD: 0 % (ref 0–5)
ERYTHROCYTE [DISTWIDTH] IN BLOOD BY AUTOMATED COUNT: 14.3 % (ref 11.6–14.5)
GLOBULIN SER CALC-MCNC: 5.4 G/DL (ref 2–4)
GLUCOSE SERPL-MCNC: 117 MG/DL (ref 74–99)
HCT VFR BLD AUTO: 37.9 % (ref 35–45)
HGB BLD-MCNC: 13.2 G/DL (ref 12–16)
IMM GRANULOCYTES # BLD AUTO: 0 K/UL
IMM GRANULOCYTES NFR BLD AUTO: 0 %
LIPASE SERPL-CCNC: 156 U/L (ref 73–393)
LYMPHOCYTES # BLD: 1.8 K/UL (ref 0.9–3.6)
LYMPHOCYTES NFR BLD: 15 % (ref 21–52)
MCH RBC QN AUTO: 29.7 PG (ref 24–34)
MCHC RBC AUTO-ENTMCNC: 34.8 G/DL (ref 31–37)
MCV RBC AUTO: 85.2 FL (ref 78–100)
MONOCYTES # BLD: 0.6 K/UL (ref 0.05–1.2)
MONOCYTES NFR BLD: 5 % (ref 3–10)
NEUTS SEG # BLD: 9.9 K/UL (ref 1.8–8)
NEUTS SEG NFR BLD: 80 % (ref 40–73)
NRBC # BLD: 0 K/UL (ref 0–0.01)
NRBC BLD-RTO: 0 PER 100 WBC
PLATELET # BLD AUTO: 351 K/UL (ref 135–420)
PLATELET COMMENTS,PCOM: ABNORMAL
PMV BLD AUTO: 9.5 FL (ref 9.2–11.8)
POTASSIUM SERPL-SCNC: 2.8 MMOL/L (ref 3.5–5.5)
PROT SERPL-MCNC: 9.2 G/DL (ref 6.4–8.2)
RBC # BLD AUTO: 4.45 M/UL (ref 4.2–5.3)
RBC MORPH BLD: ABNORMAL
SODIUM SERPL-SCNC: 137 MMOL/L (ref 136–145)
WBC # BLD AUTO: 12.3 K/UL (ref 4.6–13.2)

## 2023-02-06 PROCEDURE — 96375 TX/PRO/DX INJ NEW DRUG ADDON: CPT

## 2023-02-06 PROCEDURE — 96361 HYDRATE IV INFUSION ADD-ON: CPT

## 2023-02-06 PROCEDURE — 99284 EMERGENCY DEPT VISIT MOD MDM: CPT

## 2023-02-06 PROCEDURE — 74011250636 HC RX REV CODE- 250/636: Performed by: EMERGENCY MEDICINE

## 2023-02-06 PROCEDURE — 80053 COMPREHEN METABOLIC PANEL: CPT

## 2023-02-06 PROCEDURE — 74011000250 HC RX REV CODE- 250: Performed by: EMERGENCY MEDICINE

## 2023-02-06 PROCEDURE — 83690 ASSAY OF LIPASE: CPT

## 2023-02-06 PROCEDURE — 96374 THER/PROPH/DIAG INJ IV PUSH: CPT

## 2023-02-06 PROCEDURE — 85025 COMPLETE CBC W/AUTO DIFF WBC: CPT

## 2023-02-06 RX ORDER — ONDANSETRON 2 MG/ML
4 INJECTION INTRAMUSCULAR; INTRAVENOUS ONCE
Status: COMPLETED | OUTPATIENT
Start: 2023-02-06 | End: 2023-02-06

## 2023-02-06 RX ORDER — ONDANSETRON 4 MG/1
4 TABLET, FILM COATED ORAL
Qty: 20 TABLET | Refills: 0 | Status: SHIPPED | OUTPATIENT
Start: 2023-02-06

## 2023-02-06 RX ADMIN — SODIUM CHLORIDE 1000 ML: 9 INJECTION, SOLUTION INTRAVENOUS at 08:31

## 2023-02-06 RX ADMIN — FAMOTIDINE 20 MG: 10 INJECTION, SOLUTION INTRAVENOUS at 08:31

## 2023-02-06 RX ADMIN — ONDANSETRON 4 MG: 2 INJECTION INTRAMUSCULAR; INTRAVENOUS at 08:31

## 2023-02-06 NOTE — Clinical Note
[address] [place] [address] [facility] EMERGENCY DEPT  [id number] [address] [phone]    Work/School Note    Date: 2/6/2023    To Whom It May concern:    Ramona Torres was seen and treated today in the emergency room by the following provider(s):  Attending Provider: Julia Barriga MD.      Ramona Torres is excused from work/school on 02/06/23 and 02/07/23. She is medically clear to return to work/school on 2/8/2023.        Sincerely,          Collin Kilgore MD PATIENT HAS BEEN CALLED, GIVEN MESSAGE AND STATED THAT SHE CANNOT TAKE THE OMNICEF.  SHE CAN TAKE CEFDINIR WITH ZOFRAN.

## 2023-02-06 NOTE — ED PROVIDER NOTES
EMERGENCY DEPARTMENT HISTORY AND PHYSICAL EXAM    Date: 2/6/2023  Patient Name: Lynn Whalen    History of Presenting Illness     Chief Complaint   Patient presents with    Vomiting         History Provided By:       Additional History (Context): Lynn Whalen is a 57 y.o. female who presents to the emerged part with complaints of nausea and vomiting for the past couple days. She stated she had a COVID-19 booster shot a couple days ago and has had the symptoms since. She has some mild discomfort in the epigastric area. She has not had any hematemesis, denies any diarrhea. She does state that she had a temperature yesterday to 101 °F.  She has not had any cough, congestion, shortness of breath or chest pain. She states that she was trying some over-the-counter nausea medication and Gatorade but states she continues to vomit. PCP: Rae Murphy NP    Current Outpatient Medications   Medication Sig Dispense Refill    ondansetron hcl (Zofran) 4 mg tablet Take 1 Tablet by mouth every eight (8) hours as needed for Nausea or Vomiting. 20 Tablet 0    atorvastatin (LIPITOR) 80 mg tablet TAKE ONE TABLET BY MOUTH ONCE NIGHTLY 90 Tablet 0    amLODIPine (NORVASC) 10 mg tablet TAKE ONE TABLET BY MOUTH DAILY 90 Tablet 1    hydroCHLOROthiazide (MICROZIDE) 12.5 mg capsule TAKE ONE CAPSULE BY MOUTH DAILY FOR HIGH BLOOD PRESSURE 90 Capsule 1    aspirin delayed-release 81 mg tablet Take 162 mg by mouth daily. Past History     Past Medical History:  Past Medical History:   Diagnosis Date    Breast cancer screening by mammogram 08/16/2019    Breast biopsy benign. May resume routine annual screenings.     Hypercholesterolemia     Hypertension     TIA (transient ischemic attack)        Past Surgical History:  Past Surgical History:   Procedure Laterality Date    HX BREAST BIOPSY  6/2006    Right-benign    HX OOPHORECTOMY      HX OTHER SURGICAL  1/2013    left neck cyst removal    HX OTHER SURGICAL Right 2017    Pelvic surgery possibly ovary       Family History:  Family History   Problem Relation Age of Onset    Hypertension Mother     Diabetes Mother     Hypertension Father     Heart Disease Father     Stroke Maternal Grandmother     Lung Cancer Maternal Grandmother        Social History:  Social History     Tobacco Use    Smoking status: Former     Packs/day: 0.50     Years: 10.00     Pack years: 5.00     Types: Cigarettes     Quit date: 7/3/2017     Years since quittin.6    Smokeless tobacco: Never   Vaping Use    Vaping Use: Never used   Substance Use Topics    Alcohol use: Yes     Alcohol/week: 6.0 standard drinks     Types: 6 Glasses of wine per week     Comment: light    Drug use: Yes     Types: Marijuana       Allergies:  No Known Allergies      Review of Systems   Review of Systems   Constitutional:  Positive for chills, fatigue and fever. Gastrointestinal:  Positive for abdominal pain, nausea and vomiting. Negative for abdominal distention. Genitourinary:  Positive for decreased urine volume, difficulty urinating and flank pain. All Other Systems Negative  Physical Exam     Vitals:    23 0650 23 1215   BP: (!) 128/90 125/86   Pulse: 94 89   Resp: 16 18   Temp: 98.4 °F (36.9 °C) 98.2 °F (36.8 °C)   SpO2: 100% 100%   Weight: 79.4 kg (175 lb)    Height: 5' 5\" (1.651 m)      Physical Exam  Vitals and nursing note reviewed. Constitutional:       Appearance: She is normal weight. She is ill-appearing. HENT:      Head: Normocephalic and atraumatic. Right Ear: External ear normal.      Left Ear: External ear normal.      Nose: Nose normal.      Mouth/Throat:      Mouth: Mucous membranes are dry. Pharynx: Oropharynx is clear. Eyes:      Extraocular Movements: Extraocular movements intact. Conjunctiva/sclera: Conjunctivae normal.   Cardiovascular:      Rate and Rhythm: Normal rate and regular rhythm. Pulses: Normal pulses. Heart sounds: Normal heart sounds. Pulmonary:      Effort: Pulmonary effort is normal.      Breath sounds: Normal breath sounds. Abdominal:      General: Abdomen is flat. Bowel sounds are decreased. Palpations: Abdomen is soft. Tenderness: There is abdominal tenderness in the epigastric area. Skin:     General: Skin is warm and dry. Capillary Refill: Capillary refill takes less than 2 seconds. Neurological:      General: No focal deficit present. Mental Status: She is alert and oriented to person, place, and time. Mental status is at baseline. Psychiatric:         Mood and Affect: Mood normal.         Behavior: Behavior normal.         Thought Content: Thought content normal.         Judgment: Judgment normal.         Diagnostic Study Results     Labs -     Recent Results (from the past 12 hour(s))   CBC WITH AUTOMATED DIFF    Collection Time: 02/06/23  6:59 AM   Result Value Ref Range    WBC 12.3 4.6 - 13.2 K/uL    RBC 4.45 4.20 - 5.30 M/uL    HGB 13.2 12.0 - 16.0 g/dL    HCT 37.9 35.0 - 45.0 %    MCV 85.2 78.0 - 100.0 FL    MCH 29.7 24.0 - 34.0 PG    MCHC 34.8 31.0 - 37.0 g/dL    RDW 14.3 11.6 - 14.5 %    PLATELET 599 463 - 378 K/uL    MPV 9.5 9.2 - 11.8 FL    NRBC 0.0 0  WBC    ABSOLUTE NRBC 0.00 0.00 - 0.01 K/uL    NEUTROPHILS 80 (H) 40 - 73 %    LYMPHOCYTES 15 (L) 21 - 52 %    MONOCYTES 5 3 - 10 %    EOSINOPHILS 0 0 - 5 %    BASOPHILS 0 0 - 2 %    IMMATURE GRANULOCYTES 0 %    ABS. NEUTROPHILS 9.9 (H) 1.8 - 8.0 K/UL    ABS. LYMPHOCYTES 1.8 0.9 - 3.6 K/UL    ABS. MONOCYTES 0.6 0.05 - 1.2 K/UL    ABS. EOSINOPHILS 0.0 0.0 - 0.4 K/UL    ABS. BASOPHILS 0.0 0.0 - 0.1 K/UL    ABS. IMM.  GRANS. 0.0 K/UL    DF MANUAL      PLATELET COMMENTS ADEQUATE PLATELETS      RBC COMMENTS NORMOCYTIC, NORMOCHROMIC     METABOLIC PANEL, COMPREHENSIVE    Collection Time: 02/06/23  6:59 AM   Result Value Ref Range    Sodium 137 136 - 145 mmol/L    Potassium 2.8 (LL) 3.5 - 5.5 mmol/L    Chloride 105 100 - 111 mmol/L    CO2 26 21 - 32 mmol/L    Anion gap 6 3.0 - 18 mmol/L    Glucose 117 (H) 74 - 99 mg/dL    BUN 17 7.0 - 18 MG/DL    Creatinine 0.74 0.6 - 1.3 MG/DL    BUN/Creatinine ratio 23 (H) 12 - 20      eGFR >60 >60 ml/min/1.73m2    Calcium 9.3 8.5 - 10.1 MG/DL    Bilirubin, total 0.7 0.2 - 1.0 MG/DL    ALT (SGPT) 64 (H) 13 - 56 U/L    AST (SGOT) 24 10 - 38 U/L    Alk. phosphatase 100 45 - 117 U/L    Protein, total 9.2 (H) 6.4 - 8.2 g/dL    Albumin 3.8 3.4 - 5.0 g/dL    Globulin 5.4 (H) 2.0 - 4.0 g/dL    A-G Ratio 0.7 (L) 0.8 - 1.7     LIPASE    Collection Time: 02/06/23  6:59 AM   Result Value Ref Range    Lipase 156 73 - 393 U/L       Radiologic Studies -   No orders to display     CT Results  (Last 48 hours)      None          CXR Results  (Last 48 hours)      None              Medical Decision Making   I am the first provider for this patient. I reviewed the vital signs, available nursing notes, past medical history, past surgical history, family history and social history. Vital Signs-Reviewed the patient's vital signs. Records Reviewed: Nursing Notes and Old Medical Records     Procedures: None   Procedures    Provider Notes (Medical Decision Making):   53-year-old female presented to the emerged part with nausea and vomiting for the past 2 days. Her abdominal exam is relatively benign, she does not have any significant tenderness to palpation. Laboratory studies show mild hypokalemia at 2.8, normal lipase normal CBC. Will hydrate with normal saline, treat nausea with Zofran and give a dose of Pepcid and reevaluate. If she continues to be unable to tolerate p.o. or has worsening pain, will consider imaging. Reevaluated patient, she states her nausea is much improved, she is anxious to drink some fluids. Reviewed all of her lab work with her which is largely unremarkable. Assuming she is able to tolerate her fluids, plan will be to discharge home with a prescription for oral Zofran.              MED RECONCILIATION:  No current facility-administered medications for this encounter. Current Outpatient Medications   Medication Sig    ondansetron hcl (Zofran) 4 mg tablet Take 1 Tablet by mouth every eight (8) hours as needed for Nausea or Vomiting. atorvastatin (LIPITOR) 80 mg tablet TAKE ONE TABLET BY MOUTH ONCE NIGHTLY    amLODIPine (NORVASC) 10 mg tablet TAKE ONE TABLET BY MOUTH DAILY    hydroCHLOROthiazide (MICROZIDE) 12.5 mg capsule TAKE ONE CAPSULE BY MOUTH DAILY FOR HIGH BLOOD PRESSURE    aspirin delayed-release 81 mg tablet Take 162 mg by mouth daily. Disposition:  Home     DISCHARGE NOTE:   Pt has been reexamined. Patient has no new complaints, changes, or physical findings. Care plan outlined and precautions discussed. Results of workup were reviewed with the patient. All medications were reviewed with the patient. All of pt's questions and concerns were addressed. Patient was instructed and agrees to follow up with PCP as well as to return to the ED upon further deterioration. Patient is ready to go home. Follow-up Information       Follow up With Specialties Details Why Contact Info    Jose Castillo NP Nurse Practitioner, Family Medicine Schedule an appointment as soon as possible for a visit in 2 days  [address]  [phone]              Discharge Medication List as of 2/6/2023 12:12 PM              Diagnosis     Clinical Impression:   1. Nausea and vomiting, unspecified vomiting type          \"Please note that this dictation was completed with [employer], the computer voice recognition software. Quite often unanticipated grammatical, syntax, homophones, and other interpretive errors are inadvertently transcribed by the computer software. Please disregard these errors. Please excuse any errors that have escaped final proofreading. \"

## 2023-02-06 NOTE — Clinical Note
[address] [place] [address] [facility] EMERGENCY DEPT  [id number] [address] [phone]    Work/School Note    Date: 2/6/2023    To Whom It May concern:    Escobar Baptiste was seen and treated today in the emergency room by the following provider(s):  Attending Provider: Nicol Castillo MD.      Escobar Baptiste is excused from work/school on 02/06/23 and 02/07/23. She is medically clear to return to work/school on 2/8/2023.        Sincerely,          Bri Connelly RN

## 2023-02-13 RX ORDER — HYDROCHLOROTHIAZIDE 12.5 MG/1
12.5 CAPSULE, GELATIN COATED ORAL DAILY
COMMUNITY
Start: 2022-09-23

## 2023-02-13 RX ORDER — ASPIRIN 81 MG/1
162 TABLET ORAL DAILY
COMMUNITY

## 2023-02-13 RX ORDER — ATORVASTATIN CALCIUM 80 MG/1
80 TABLET, FILM COATED ORAL DAILY
COMMUNITY
Start: 2023-01-30

## 2023-02-13 RX ORDER — AMLODIPINE BESYLATE 10 MG/1
10 TABLET ORAL DAILY
COMMUNITY
Start: 2022-09-23

## 2023-02-13 NOTE — PERIOP NOTE
PRE-SURGICAL INSTRUCTIONS        Patient's Name:  Oracio LANG Date:  2/13/2023            Covid Testing Date and Time:    Surgery Date:  02/15/2023                Do NOT eat or drink anything, including candy, gum, or ice chips after midnight on 02/14, unless you have specific instructions from your surgeon or anesthesia provider to do so. You may brush your teeth before coming to the hospital.  No smoking 24 hours prior to the day of surgery. No alcohol 24 hours prior to the day of surgery. No recreational drugs for one week prior to the day of surgery. Leave all valuables, including money/purse, at home. Remove all jewelry, nail polish, acrylic nails, and makeup (including mascara); no lotions powders, deodorant, or perfume/cologne/after shave on the skin. Follow instruction for Hibiclens washes and CHG wipes from surgeon's office. Glasses/contact lenses and dentures may be worn to the hospital.  They will be removed prior to surgery. Call your doctor if symptoms of a cold or illness develop within 24-48 hours prior to your surgery. 11.  If you are having an outpatient procedure, please make arrangements for a responsible ADULT TO [address] [place] [address] and stay with you for 24 hours after your surgery. 12. ONE VISITOR in the hospital at this time for outpatient procedures. Exceptions may be made for surgical admissions, per nursing unit guidelines      Special Instructions:      Bring list of CURRENT medications. Bring any pertinent legal medical records. Take these medications the morning of surgery with a sip of water:  Amlodipine  Follow physician instructions about stopping anticoagulants. Complete bowel prep per MD instructions. On the day of surgery, come in the main entrance of [facility]. Let the  at the desk know you are there for surgery. A staff member will come escort you to the surgical area on the second floor.     If you have any questions or concerns, please do not hesitate to call:     (Prior to the day of surgery) PAT department:  [phone]   (Day of surgery) Pre-Op department:  [phone]    These surgical instructions were reviewed with Elen Fraser during the [facility] phone call.

## 2023-02-14 ENCOUNTER — ANESTHESIA EVENT (OUTPATIENT)
Facility: HOSPITAL | Age: 48
End: 2023-02-14
Payer: COMMERCIAL

## 2023-02-15 ENCOUNTER — HOSPITAL ENCOUNTER (OUTPATIENT)
Facility: HOSPITAL | Age: 48
Setting detail: OUTPATIENT SURGERY
Discharge: HOME | End: 2023-02-15
Attending: STUDENT IN AN ORGANIZED HEALTH CARE EDUCATION/TRAINING PROGRAM | Admitting: STUDENT IN AN ORGANIZED HEALTH CARE EDUCATION/TRAINING PROGRAM
Payer: COMMERCIAL

## 2023-02-15 ENCOUNTER — ANESTHESIA (OUTPATIENT)
Facility: HOSPITAL | Age: 48
End: 2023-02-15
Payer: COMMERCIAL

## 2023-02-15 VITALS
TEMPERATURE: 98.2 F | WEIGHT: 164.4 LBS | BODY MASS INDEX: 27.39 KG/M2 | HEIGHT: 65 IN | DIASTOLIC BLOOD PRESSURE: 77 MMHG | OXYGEN SATURATION: 99 % | SYSTOLIC BLOOD PRESSURE: 120 MMHG | HEART RATE: 77 BPM | RESPIRATION RATE: 20 BRPM

## 2023-02-15 LAB
AMPHET UR QL SCN: NEGATIVE
ANION GAP SERPL CALC-SCNC: 2 MMOL/L (ref 3–18)
BARBITURATES UR QL SCN: NEGATIVE
BENZODIAZ UR QL: NEGATIVE
BUN SERPL-MCNC: 5 MG/DL (ref 7–18)
BUN/CREAT SERPL: 10 (ref 12–20)
CALCIUM SERPL-MCNC: 8.1 MG/DL (ref 8.5–10.1)
CANNABINOIDS UR QL SCN: POSITIVE
CHLORIDE SERPL-SCNC: 110 MMOL/L (ref 100–111)
CO2 SERPL-SCNC: 28 MMOL/L (ref 21–32)
COCAINE UR QL SCN: NEGATIVE
CREAT SERPL-MCNC: 0.5 MG/DL (ref 0.6–1.3)
GLUCOSE SERPL-MCNC: 90 MG/DL (ref 74–99)
HCG UR QL: NEGATIVE
Lab: ABNORMAL
METHADONE UR QL: NEGATIVE
OPIATES UR QL: NEGATIVE
PCP UR QL: NEGATIVE
POTASSIUM SERPL-SCNC: 3.3 MMOL/L (ref 3.5–5.5)
SODIUM SERPL-SCNC: 140 MMOL/L (ref 136–145)

## 2023-02-15 PROCEDURE — 6360000002 HC RX W HCPCS: Performed by: NURSE ANESTHETIST, CERTIFIED REGISTERED

## 2023-02-15 PROCEDURE — 3700000000 HC ANESTHESIA ATTENDED CARE: Performed by: STUDENT IN AN ORGANIZED HEALTH CARE EDUCATION/TRAINING PROGRAM

## 2023-02-15 PROCEDURE — 3600007512: Performed by: STUDENT IN AN ORGANIZED HEALTH CARE EDUCATION/TRAINING PROGRAM

## 2023-02-15 PROCEDURE — 6370000000 HC RX 637 (ALT 250 FOR IP): Performed by: NURSE ANESTHETIST, CERTIFIED REGISTERED

## 2023-02-15 PROCEDURE — 2500000003 HC RX 250 WO HCPCS: Performed by: NURSE ANESTHETIST, CERTIFIED REGISTERED

## 2023-02-15 PROCEDURE — 3700000001 HC ADD 15 MINUTES (ANESTHESIA): Performed by: STUDENT IN AN ORGANIZED HEALTH CARE EDUCATION/TRAINING PROGRAM

## 2023-02-15 PROCEDURE — 3600007502: Performed by: STUDENT IN AN ORGANIZED HEALTH CARE EDUCATION/TRAINING PROGRAM

## 2023-02-15 PROCEDURE — 80048 BASIC METABOLIC PNL TOTAL CA: CPT

## 2023-02-15 PROCEDURE — 2709999900 HC NON-CHARGEABLE SUPPLY: Performed by: STUDENT IN AN ORGANIZED HEALTH CARE EDUCATION/TRAINING PROGRAM

## 2023-02-15 PROCEDURE — 81025 URINE PREGNANCY TEST: CPT

## 2023-02-15 PROCEDURE — 2580000003 HC RX 258: Performed by: NURSE ANESTHETIST, CERTIFIED REGISTERED

## 2023-02-15 PROCEDURE — 80307 DRUG TEST PRSMV CHEM ANLYZR: CPT

## 2023-02-15 PROCEDURE — 7100000011 HC PHASE II RECOVERY - ADDTL 15 MIN: Performed by: STUDENT IN AN ORGANIZED HEALTH CARE EDUCATION/TRAINING PROGRAM

## 2023-02-15 PROCEDURE — 7100000010 HC PHASE II RECOVERY - FIRST 15 MIN: Performed by: STUDENT IN AN ORGANIZED HEALTH CARE EDUCATION/TRAINING PROGRAM

## 2023-02-15 RX ORDER — SODIUM CHLORIDE 9 MG/ML
INJECTION, SOLUTION INTRAVENOUS PRN
Status: DISCONTINUED | OUTPATIENT
Start: 2023-02-15 | End: 2023-02-15 | Stop reason: HOSPADM

## 2023-02-15 RX ORDER — LIDOCAINE HYDROCHLORIDE 10 MG/ML
1 INJECTION, SOLUTION EPIDURAL; INFILTRATION; INTRACAUDAL; PERINEURAL
Status: DISCONTINUED | OUTPATIENT
Start: 2023-02-15 | End: 2023-02-15 | Stop reason: HOSPADM

## 2023-02-15 RX ORDER — PROPOFOL 10 MG/ML
INJECTION, EMULSION INTRAVENOUS PRN
Status: DISCONTINUED | OUTPATIENT
Start: 2023-02-15 | End: 2023-02-15 | Stop reason: SDUPTHER

## 2023-02-15 RX ORDER — FAMOTIDINE 20 MG/1
20 TABLET, FILM COATED ORAL ONCE
Status: COMPLETED | OUTPATIENT
Start: 2023-02-15 | End: 2023-02-15

## 2023-02-15 RX ORDER — LIDOCAINE HYDROCHLORIDE 20 MG/ML
INJECTION, SOLUTION EPIDURAL; INFILTRATION; INTRACAUDAL; PERINEURAL PRN
Status: DISCONTINUED | OUTPATIENT
Start: 2023-02-15 | End: 2023-02-15 | Stop reason: SDUPTHER

## 2023-02-15 RX ORDER — SODIUM CHLORIDE 0.9 % (FLUSH) 0.9 %
5-40 SYRINGE (ML) INJECTION EVERY 12 HOURS SCHEDULED
Status: DISCONTINUED | OUTPATIENT
Start: 2023-02-15 | End: 2023-02-15 | Stop reason: HOSPADM

## 2023-02-15 RX ORDER — SODIUM CHLORIDE 0.9 % (FLUSH) 0.9 %
5-40 SYRINGE (ML) INJECTION PRN
Status: DISCONTINUED | OUTPATIENT
Start: 2023-02-15 | End: 2023-02-15 | Stop reason: HOSPADM

## 2023-02-15 RX ADMIN — FAMOTIDINE 20 MG: 20 TABLET, FILM COATED ORAL at 08:10

## 2023-02-15 RX ADMIN — PROPOFOL 50 MG: 10 INJECTION, EMULSION INTRAVENOUS at 10:33

## 2023-02-15 RX ADMIN — PROPOFOL 40 MG: 10 INJECTION, EMULSION INTRAVENOUS at 10:23

## 2023-02-15 RX ADMIN — PROPOFOL 100 MG: 10 INJECTION, EMULSION INTRAVENOUS at 10:16

## 2023-02-15 RX ADMIN — PROPOFOL 40 MG: 10 INJECTION, EMULSION INTRAVENOUS at 10:30

## 2023-02-15 RX ADMIN — PROPOFOL 40 MG: 10 INJECTION, EMULSION INTRAVENOUS at 10:20

## 2023-02-15 RX ADMIN — LIDOCAINE HYDROCHLORIDE 50 MG: 20 INJECTION, SOLUTION EPIDURAL; INFILTRATION; INTRACAUDAL; PERINEURAL at 10:16

## 2023-02-15 RX ADMIN — SODIUM CHLORIDE 250 ML/HR: 9 INJECTION, SOLUTION INTRAVENOUS at 08:12

## 2023-02-15 RX ADMIN — PROPOFOL 40 MG: 10 INJECTION, EMULSION INTRAVENOUS at 10:27

## 2023-02-15 ASSESSMENT — PAIN SCALES - GENERAL
PAINLEVEL_OUTOF10: 0

## 2023-02-15 ASSESSMENT — PAIN - FUNCTIONAL ASSESSMENT: PAIN_FUNCTIONAL_ASSESSMENT: 0-10

## 2023-02-15 NOTE — ANESTHESIA PRE PROCEDURE
Department of Anesthesiology  Preprocedure Note       Name:  Anastacia Mayorga   Age:  57 y.o.  :  1975                                          MRN:  [id number]         Date:  2/15/2023      Surgeon: Rosmery Solorzano):  Josse Booth MD    Procedure: Procedure(s):  COLONOSCOPY    Medications prior to admission:   Prior to Admission medications    Medication Sig Start Date End Date Taking? Authorizing Provider   amLODIPine (NORVASC) 10 MG tablet Take 10 mg by mouth daily 22  Yes Historical Provider, MD   atorvastatin (LIPITOR) 80 MG tablet Take 80 mg by mouth daily 23  Yes Historical Provider, MD   hydroCHLOROthiazide (MICROZIDE) 12.5 MG capsule Take 12.5 mg by mouth daily 22  Yes Historical Provider, MD   aspirin 81 MG EC tablet Take 162 mg by mouth daily    Historical Provider, MD       Current medications:    Current Facility-Administered Medications   Medication Dose Route Frequency Provider Last Rate Last Admin    sodium chloride flush 0.9 % injection 5-40 mL  5-40 mL IntraVENous 2 times per day Liat Thakkar, APRN - CRNA        sodium chloride flush 0.9 % injection 5-40 mL  5-40 mL IntraVENous PRN Liat Thakkar, APRN - CRNA        0.9 % sodium chloride infusion   IntraVENous PRN Liat Thakkar, APRN - CRNA        lidocaine PF 1 % injection 1 mL  1 mL IntraDERmal Once PRN Liat Thakkar, APRN - CRNA        famotidine (PEPCID) tablet 20 mg  20 mg Oral Once Liat Thakkar, APRN - CRNA           Allergies:  No Known Allergies    Problem List:    Patient Active Problem List   Diagnosis Code    History of TIA (transient ischemic attack) Z86.73    Primary hypertension I10    Hyperlipidemia LDL goal <70 E78.5    Elevated LFTs R79.89       Past Medical History:        Diagnosis Date    Breast cancer screening by mammogram 2019    Breast biopsy benign. May resume routine annual screenings.     Hypercholesterolemia     Hypertension     TIA (transient ischemic attack)        Past Surgical History:        Procedure Laterality Date    ABDOMEN SURGERY      BREAST BIOPSY Right 2006    Right-benign    OTHER SURGICAL HISTORY Right 2017    Pelvic surgery possibly ovary    OTHER SURGICAL HISTORY  2013    left neck cyst removal    OVARY REMOVAL Left        Social History:    Social History     Tobacco Use    Smoking status: Former     Packs/day: 0.50     Types: Cigarettes     Quit date: 7/3/2017     Years since quittin.6    Smokeless tobacco: Never   Substance Use Topics    Alcohol use: Yes     Alcohol/week: 4.0 standard drinks     Types: 4 Glasses of wine per week                                Counseling given: Not Answered      Vital Signs (Current):   Vitals:    23 1316 02/15/23 0754 02/15/23 0759   BP:   114/73   Pulse:  66    Resp:  18    Temp:  98.5 °F (36.9 °C)    TempSrc:  Oral    SpO2:  99%    Weight: 170 lb (77.1 kg) 164 lb 6.4 oz (74.6 kg)    Height: 5' 5\" (1.651 m) 5' 5\" (1.651 m)                                               BP Readings from Last 3 Encounters:   02/15/23 114/73   23 101/70   23 124/80       NPO Status: Time of last liquid consumption: 0300                        Time of last solid consumption: 1900                        Date of last liquid consumption: 02/15/23                        Date of last solid food consumption: 23    BMI:   Wt Readings from Last 3 Encounters:   02/15/23 164 lb 6.4 oz (74.6 kg)   23 167 lb (75.8 kg)   23 166 lb (75.3 kg)     Body mass index is 27.36 kg/m².     CBC:   Lab Results   Component Value Date/Time    WBC 12.3 2023 06:59 AM    RBC 4.45 2023 06:59 AM    HGB 13.2 2023 06:59 AM    HCT 37.9 2023 06:59 AM    MCV 85.2 2023 06:59 AM    RDW 14.3 2023 06:59 AM     2023 06:59 AM       CMP:   Lab Results   Component Value Date/Time     2023 06:59 AM    K 2.8 2023 06:59 AM     2023 06:59 AM    CO2 26 2023 06:59 AM    BUN 17 02/06/2023 06:59 AM    CREATININE 0.74 02/06/2023 06:59 AM    GFRAA >60 09/27/2022 08:57 AM    AGRATIO 0.7 02/06/2023 06:59 AM    LABGLOM 113 03/28/2022 12:00 AM    GLUCOSE 117 02/06/2023 06:59 AM    PROT 9.2 02/06/2023 06:59 AM    CALCIUM 9.3 02/06/2023 06:59 AM    BILITOT 0.7 02/06/2023 06:59 AM    ALKPHOS 100 02/06/2023 06:59 AM    AST 24 02/06/2023 06:59 AM    ALT 64 02/06/2023 06:59 AM       POC Tests: No results for input(s): POCGLU, POCNA, POCK, POCCL, POCBUN, POCHEMO, POCHCT in the last 72 hours. Coags:   Lab Results   Component Value Date/Time    PROTIME 13.5 10/29/2021 05:55 PM    INR 1.0 10/29/2021 05:55 PM       HCG (If Applicable): No results found for: PREGTESTUR, PREGSERUM, HCG, HCGQUANT     ABGs: No results found for: PHART, PO2ART, CMC7PSD, NZF0MKF, BEART, G2VUPZYJ     Type & Screen (If Applicable):  No results found for: LABABO, LABRH    Drug/Infectious Status (If Applicable):  Lab Results   Component Value Date/Time    HEPCAB 0.09 07/03/2019 08:43 AM    HEPCAB NEGATIVE 07/03/2019 08:43 AM       COVID-19 Screening (If Applicable): No results found for: COVID19        Anesthesia Evaluation  Patient summary reviewed  Airway: Mallampati: II  TM distance: >3 FB   Neck ROM: full     Dental:    (+) poor dentition      Pulmonary: breath sounds clear to auscultation                             Cardiovascular:    (+) hypertension:,         Rhythm: regular  Rate: normal                    Neuro/Psych:   (+) TIA,             GI/Hepatic/Renal:             Endo/Other:                     Abdominal:             Vascular: Other Findings:           Anesthesia Plan      MAC     ASA 3             Anesthetic plan and risks discussed with patient.                         Ayleen Pineda MD   2/15/2023

## 2023-02-15 NOTE — ANESTHESIA POSTPROCEDURE EVALUATION
Department of Anesthesiology  Postprocedure Note    Patient: Viktor Proctor  MRN: [id number]  YOB: 1975  Date of evaluation: 2/15/2023      Procedure Summary     Date: 02/15/23 Room / Location: [facility] ENDO 03 / [facility] ENDOSCOPY    Anesthesia Start: 1009 Anesthesia Stop: 4014    Procedure: COLONOSCOPY (Abdomen) Diagnosis:       TIA (transient ischemic attack)      Colon cancer screening      Elevated LFTs      Alcohol use      BMI 29.0-29.9,adult      (TIA (transient ischemic attack) [G45.9])      (Colon cancer screening [Z12.11])      (Elevated LFTs [R79.89])      (Alcohol use [Z78.9])      (BMI 29.0-29.9,adult [Z68.29])    Surgeons: Kelle Dela Cruz MD Responsible Provider: Ashanti Barbosa MD    Anesthesia Type: MAC ASA Status: 3          Anesthesia Type: MAC    Dinesh Phase I: Dinesh Score: 10    Dinesh Phase II: Dinesh Score: 9      Anesthesia Post Evaluation    Patient location during evaluation: bedside  Patient participation: complete - patient participated  Airway patency: patent  Complications: no  Cardiovascular status: hemodynamically stable  Respiratory status: acceptable  Hydration status: stable

## 2023-02-15 NOTE — H&P
[email or web address]  [phone]    Chief Complaint: CRCS    History of present illness:  Average risk, intial.    PMH:   Past Medical History:   Diagnosis Date    Breast cancer screening by mammogram 2019    Breast biopsy benign. May resume routine annual screenings. Hypercholesterolemia     Hypertension     TIA (transient ischemic attack)      Allergies: No Known Allergies  Medications:   Current Facility-Administered Medications:     sodium chloride flush 0.9 % injection 5-40 mL, 5-40 mL, IntraVENous, 2 times per day, Yessica , APRN - CRNA    sodium chloride flush 0.9 % injection 5-40 mL, 5-40 mL, IntraVENous, PRN, Yessica Nathan, APRN - CRNA    0.9 % sodium chloride infusion, , IntraVENous, PRN, Yessica Nathan, APRN - CRNA    lidocaine PF 1 % injection 1 mL, 1 mL, IntraDERmal, Once PRN, Yessica Nathan APRN - CRNA    famotidine (PEPCID) tablet 20 mg, 20 mg, Oral, Once, CARMEN Cabrera - CRNA  FH:   Family History   Problem Relation Age of Onset    Stroke Maternal Grandmother     Lung Cancer Maternal Grandmother     Heart Disease Father     Hypertension Father     Diabetes Mother     Hypertension Mother      Social:   Social History     Socioeconomic History    Marital status: Single     Spouse name: None    Number of children: None    Years of education: 11    Highest education level: None   Tobacco Use    Smoking status: Former     Packs/day: 0.50     Types: Cigarettes     Quit date: 7/3/2017     Years since quittin.6    Smokeless tobacco: Never   Vaping Use    Vaping Use: Never used   Substance and Sexual Activity    Alcohol use:  Yes     Alcohol/week: 4.0 standard drinks     Types: 4 Glasses of wine per week    Drug use: Yes     Types: Marijuana Stephon Blow)     Comment: Daily     Surgical H:   Past Surgical History:   Procedure Laterality Date    ABDOMEN SURGERY      BREAST BIOPSY Right 2006    Right-benign    OTHER SURGICAL HISTORY Right 2017    Pelvic surgery possibly ovary OTHER SURGICAL HISTORY  1/2013    left neck cyst removal    OVARY REMOVAL Left        ROS: negative    Physical Exam: Ht 5' 5\" (1.651 m)   Wt 170 lb (77.1 kg)   LMP 02/07/2023 (Approximate)   BMI 28.29 kg/m²   General appearance: alert, no distress  Eyes: pupils equal and reactive, extraocular eye movements intact  Nodes: No gross adenopathy in neck. Skin: no spider angiomata, jaundice, palmar erythema   Respiratory: clear to auscultation bilaterally  Cardiovascular: regular heart rate, no murmurs, no JVD, normal rate and regular rhythm  Abdomen: soft, non-tender, liver not enlarged, spleen not palpable, no obvious ascites  Extremities: no muscle wasting, no gross arthritic changes  Neurologic: alert and oriented, cranial nerves grossly intact, no asterixis    Imp/ Plan: Will proceed with colonoscopy as planned. Risk benefits alternative including but not limited to infection, bleeding, perforation of viscous, allergic reaction and resultant morbidity and mortality was discussed. Chance of missing a significant lesion due to various reasons were discussed.     Patileatha Betancourt MD   [facility] 1945, [facility]

## 2023-02-15 NOTE — DISCHARGE INSTRUCTIONS
Colonoscopy: What to Expect at [address] [place] [address]  After a colonoscopy, you'll stay at the clinic until you wake up. Then you can go home. But you'll need to arrange for a ride. Your doctor will tell you when you can eat and do your other usual activities. Your doctor will talk to you about when you'll need your next colonoscopy. Your doctor can help you decide how often you need to be checked. This will depend on the results of your test and your risk for colorectal cancer. After the test, you may be bloated or have gas pains. You may need to pass gas. If a biopsy was done or a polyp was removed, you may have streaks of blood in your stool (feces) for a few days. Problems such as heavy rectal bleeding may not occur until several weeks after the test. This isn't common. But it can happen after polyps are removed. This care sheet gives you a general idea about how long it will take for you to recover. But each person recovers at a different pace. Follow the steps below to get better as quickly as possible. How can you care for yourself at home? Activity    Rest when you feel tired. You can do your normal activities when it feels okay to do so. Diet    Follow your doctor's directions for eating. Unless your doctor has told you not to, drink plenty of fluids. This helps to replace the fluids that were lost during the colon prep. Do not drink alcohol. Medicines    Your doctor will tell you if and when you can restart your medicines. You will also be given instructions about taking any new medicines. If you stopped taking aspirin or some other blood thinner, your doctor will tell you when to start taking it again. If polyps were removed or a biopsy was done during the test, your doctor may tell you not to take aspirin or other anti-inflammatory medicines for a few days. These include ibuprofen (Advil, Motrin) and naproxen (Aleve).    Other instructions    For your safety, do not drive or operate machinery until the medicine wears off and you can think clearly. Your doctor may tell you not to drive or operate machinery until the day after your test.     Do not sign legal documents or make major decisions until the medicine wears off and you can think clearly. The anesthesia can make it hard for you to fully understand what you are agreeing to. Follow-up care is a key part of your treatment and safety. Be sure to make and go to all appointments, and call your doctor if you are having problems. It's also a good idea to know your test results and keep a list of the medicines you take. When should you call for help? Call 911 anytime you think you may need emergency care. For example, call if:    You passed out (lost consciousness). You pass maroon or bloody stools. You have trouble breathing. Call your doctor now or seek immediate medical care if:    You have pain that does not get better after you take pain medicine. You are sick to your stomach or cannot drink fluids. You have new or worse belly pain. You have blood in your stools. You have a fever. You cannot pass stools or gas. Watch closely for changes in your health, and be sure to contact your doctor if you have any problems. Where can you learn more? Go to http://www.woods.com/ and enter E264 to learn more about \"Colonoscopy: What to Expect at Home. \"  Current as of: May 4, 2022               Content Version: 13.5  © 2006-2022 [employer]wise[employer]. Care instructions adapted under license by [facility] ([facility]). If you have questions about a medical condition or this instruction, always ask your healthcare professional. [address] any warranty or liability for your use of this information.

## 2023-04-05 RX ORDER — HYDROCHLOROTHIAZIDE 12.5 MG/1
CAPSULE, GELATIN COATED ORAL
Qty: 30 CAPSULE | Refills: 0 | Status: SHIPPED | OUTPATIENT
Start: 2023-04-05 | End: 2023-05-09

## 2023-04-05 RX ORDER — AMLODIPINE BESYLATE 10 MG/1
TABLET ORAL
Qty: 30 TABLET | Refills: 0 | Status: SHIPPED | OUTPATIENT
Start: 2023-04-05 | End: 2023-05-09

## 2023-04-26 ENCOUNTER — HOSPITAL ENCOUNTER (OUTPATIENT)
Facility: HOSPITAL | Age: 48
Discharge: HOME | End: 2023-04-29

## 2023-04-26 LAB — LABCORP SPECIMEN COLLECTION: NORMAL

## 2023-04-26 PROCEDURE — 99001 SPECIMEN HANDLING PT-LAB: CPT

## 2023-04-27 LAB
ALBUMIN SERPL-MCNC: 3.9 G/DL (ref 3.8–4.8)
ALBUMIN/GLOB SERPL: 1.4 {RATIO} (ref 1.2–2.2)
ALP SERPL-CCNC: 79 IU/L (ref 44–121)
ALT SERPL-CCNC: 89 IU/L (ref 0–32)
AST SERPL-CCNC: 46 IU/L (ref 0–40)
BILIRUB SERPL-MCNC: 0.5 MG/DL (ref 0–1.2)
BUN SERPL-MCNC: 12 MG/DL (ref 6–24)
BUN/CREAT SERPL: 22 (ref 9–23)
CALCIUM SERPL-MCNC: 8.6 MG/DL (ref 8.7–10.2)
CHLORIDE SERPL-SCNC: 102 MMOL/L (ref 96–106)
CHOLEST SERPL-MCNC: 100 MG/DL (ref 100–199)
CO2 SERPL-SCNC: 21 MMOL/L (ref 20–29)
CREAT SERPL-MCNC: 0.54 MG/DL (ref 0.57–1)
EGFRCR SERPLBLD CKD-EPI 2021: 115 ML/MIN/1.73
GLOBULIN SER CALC-MCNC: 2.7 G/DL (ref 1.5–4.5)
GLUCOSE SERPL-MCNC: 80 MG/DL (ref 70–99)
HDLC SERPL-MCNC: 49 MG/DL
LDLC SERPL CALC-MCNC: 40 MG/DL (ref 0–99)
POTASSIUM SERPL-SCNC: 3.8 MMOL/L (ref 3.5–5.2)
PROT SERPL-MCNC: 6.6 G/DL (ref 6–8.5)
SODIUM SERPL-SCNC: 138 MMOL/L (ref 134–144)
SPECIMEN STATUS REPORT: NORMAL
TRIGL SERPL-MCNC: 42 MG/DL (ref 0–149)
VLDLC SERPL CALC-MCNC: 11 MG/DL (ref 5–40)

## 2023-05-08 DIAGNOSIS — E78.5 HYPERLIPIDEMIA LDL GOAL <70: ICD-10-CM

## 2023-05-08 DIAGNOSIS — Z86.73 HISTORY OF TIA (TRANSIENT ISCHEMIC ATTACK): ICD-10-CM

## 2023-05-08 DIAGNOSIS — I10 PRIMARY HYPERTENSION: Primary | ICD-10-CM

## 2023-05-08 NOTE — TELEPHONE ENCOUNTER
Last appt 1/30/23    Next appt 5/17/23    Labs 4/26/23 completed     Last filled 4/5/23 HCTZ and Norvasc   1/30/23 Atorvastatin

## 2023-05-09 RX ORDER — ATORVASTATIN CALCIUM 80 MG/1
TABLET, FILM COATED ORAL
Qty: 90 TABLET | Refills: 0 | Status: SHIPPED | OUTPATIENT
Start: 2023-05-09

## 2023-05-09 RX ORDER — AMLODIPINE BESYLATE 10 MG/1
TABLET ORAL
Qty: 90 TABLET | Refills: 1 | Status: SHIPPED | OUTPATIENT
Start: 2023-05-09

## 2023-05-09 RX ORDER — HYDROCHLOROTHIAZIDE 12.5 MG/1
CAPSULE, GELATIN COATED ORAL
Qty: 90 CAPSULE | Refills: 1 | Status: SHIPPED | OUTPATIENT
Start: 2023-05-09

## 2023-05-16 ASSESSMENT — ENCOUNTER SYMPTOMS: SHORTNESS OF BREATH: 0

## 2023-05-16 NOTE — PROGRESS NOTES
Phil Groves is a 57 y.o. female who was seen by synchronous (real-time) audio-video technology on 5/17/2023 for Cholesterol Problem, Hypertension, Discuss Labs, and Other (Elevated LFT's /Both hands at night are painful. She states that she is unable to balled hands into fists, also dropping things at work. )    Assessment & Plan:     1. Hyperlipidemia LDL goal <70  Assessment & Plan:  LDL at goal, continue regimen for now  Watch LFTs closely while on high-intensity statin  Recheck lipid panel in 6 mos  Orders:  -     Comprehensive Metabolic Panel; Future  -     Lipid Panel; Future  2. Elevated LFTs  Assessment & Plan:  Worsening, consult GI  Advised on ETOH/Tylenol use  Watch closely while on high-intensity statin  Orders:  -     Amb External Referral To Gastroenterology  -     Comprehensive Metabolic Panel; Future  3. Primary hypertension  Assessment & Plan:  BP goal <130/80, continue regimen  Orders:  -     CBC with Auto Differential; Future  -     Comprehensive Metabolic Panel; Future  4. Bilateral hand pain  Comments:  Persists, failed OTC regimen  Consult hand specialist  Orders:  -     [facility], Hand Surgery, [facility])  -     Comprehensive Metabolic Panel; Future  5. Postmenopausal syndrome  Assessment & Plan:  Persists, discussed risk vs benefits of SNRI for treatment, opted to try, ordered  Send to gyn if no improvement  Orders:  -     venlafaxine (EFFEXOR XR) 37.5 MG extended release capsule; Take 1 capsule by mouth daily, Disp-90 capsule, R-0Normal  -     CBC with Auto Differential; Future  -     Comprehensive Metabolic Panel; Future  6. Encounter to discuss test results     Follow-up and Dispositions    Return in about 6 months (around 11/17/2023) for physical, lab results.        SUBJECTIVE:     HPI    Hand Pain -  Onset:  2 mos  Both hands  Aggravating factors include gripping  Denies any numbness  Has been using aspecreme but did not help  She cooks for a

## 2023-05-16 NOTE — ASSESSMENT & PLAN NOTE
LDL at goal, continue regimen for now  Watch LFTs closely while on high-intensity statin  Recheck lipid panel in 6 mos

## 2023-05-17 ENCOUNTER — TELEPHONE (OUTPATIENT)
Facility: CLINIC | Age: 48
End: 2023-05-17

## 2023-05-17 ENCOUNTER — TELEMEDICINE (OUTPATIENT)
Facility: CLINIC | Age: 48
End: 2023-05-17
Payer: MEDICAID

## 2023-05-17 DIAGNOSIS — N95.1 POSTMENOPAUSAL SYNDROME: ICD-10-CM

## 2023-05-17 DIAGNOSIS — Z71.2 ENCOUNTER TO DISCUSS TEST RESULTS: ICD-10-CM

## 2023-05-17 DIAGNOSIS — M79.642 BILATERAL HAND PAIN: ICD-10-CM

## 2023-05-17 DIAGNOSIS — M79.641 BILATERAL HAND PAIN: ICD-10-CM

## 2023-05-17 DIAGNOSIS — R79.89 ELEVATED LFTS: ICD-10-CM

## 2023-05-17 DIAGNOSIS — E78.5 HYPERLIPIDEMIA LDL GOAL <70: Primary | ICD-10-CM

## 2023-05-17 DIAGNOSIS — I10 PRIMARY HYPERTENSION: ICD-10-CM

## 2023-05-17 PROCEDURE — 99215 OFFICE O/P EST HI 40 MIN: CPT | Performed by: NURSE PRACTITIONER

## 2023-05-17 RX ORDER — VENLAFAXINE HYDROCHLORIDE 37.5 MG/1
37.5 CAPSULE, EXTENDED RELEASE ORAL DAILY
Qty: 90 CAPSULE | Refills: 0 | Status: SHIPPED | OUTPATIENT
Start: 2023-05-17

## 2023-05-17 SDOH — ECONOMIC STABILITY: INCOME INSECURITY: HOW HARD IS IT FOR YOU TO PAY FOR THE VERY BASICS LIKE FOOD, HOUSING, MEDICAL CARE, AND HEATING?: NOT HARD AT ALL

## 2023-05-17 SDOH — ECONOMIC STABILITY: FOOD INSECURITY: WITHIN THE PAST 12 MONTHS, THE FOOD YOU BOUGHT JUST DIDN'T LAST AND YOU DIDN'T HAVE MONEY TO GET MORE.: NEVER TRUE

## 2023-05-17 SDOH — ECONOMIC STABILITY: FOOD INSECURITY: WITHIN THE PAST 12 MONTHS, YOU WORRIED THAT YOUR FOOD WOULD RUN OUT BEFORE YOU GOT MONEY TO BUY MORE.: NEVER TRUE

## 2023-05-17 SDOH — ECONOMIC STABILITY: HOUSING INSECURITY
IN THE LAST 12 MONTHS, WAS THERE A TIME WHEN YOU DID NOT HAVE A STEADY PLACE TO SLEEP OR SLEPT IN A SHELTER (INCLUDING NOW)?: NO

## 2023-05-17 ASSESSMENT — PATIENT HEALTH QUESTIONNAIRE - PHQ9
SUM OF ALL RESPONSES TO PHQ QUESTIONS 1-9: 0
SUM OF ALL RESPONSES TO PHQ QUESTIONS 1-9: 0
SUM OF ALL RESPONSES TO PHQ9 QUESTIONS 1 & 2: 0
2. FEELING DOWN, DEPRESSED OR HOPELESS: 0
SUM OF ALL RESPONSES TO PHQ QUESTIONS 1-9: 0
1. LITTLE INTEREST OR PLEASURE IN DOING THINGS: 0
SUM OF ALL RESPONSES TO PHQ QUESTIONS 1-9: 0

## 2023-05-17 NOTE — ASSESSMENT & PLAN NOTE
Persists, discussed risk vs benefits of SNRI for treatment, opted to try, ordered  Send to gyn if no improvement

## 2023-05-17 NOTE — PROGRESS NOTES
Kalee Thornton presents today for   Chief Complaint   Patient presents with    Cholesterol Problem    Hypertension    Discuss Labs    Other     Elevated LFT's   Both hands at night are painful. She states that she is unable to balled hands into fists, also dropping things at work. Virtual/telephone visit    Depression Screening:  PHQ-9 Questionaire 5/17/2023 1/30/2023 11/2/2022 10/18/2022 4/5/2022 7/3/2019   Little interest or pleasure in doing things 0 0 0 0 0 0   Feeling down, depressed, or hopeless 0 0 0 0 0 0   PHQ-9 Total Score 0 0 0 0 0 0         Learning Assessment:  No question data found. Fall Risk  No flowsheet data found. Travel Screening:    Travel Screening       Question Response    In the last 10 days, have you been in contact with someone who was confirmed or suspected to have Coronavirus/COVID-19? --    Have you had a COVID-19 viral test in the last 10 days? No    Do you have any of the following new or worsening symptoms? None of these    Have you traveled internationally or domestically in the last month? No          Travel History   Travel since 04/17/23    No documented travel since 04/17/23         Health Maintenance reviewed and discussed and ordered per Provider. Health Maintenance Due   Topic Date Due    COVID-19 Vaccine (3 - Booster for Moderna series) 10/03/2021   . Coordination of Care:    1. Have you been to the ER, urgent care clinic since your last visit? Hospitalized since your last visit? no    2. Have you seen or consulted any other health care providers outside of the [address] [place] [address] since your last visit? Include any pap smears or colon screening.  no

## 2023-05-19 ENCOUNTER — HOSPITAL ENCOUNTER (EMERGENCY)
Facility: HOSPITAL | Age: 48
Discharge: HOME | End: 2023-05-19
Attending: EMERGENCY MEDICINE
Payer: MEDICAID

## 2023-05-19 ENCOUNTER — APPOINTMENT (OUTPATIENT)
Facility: HOSPITAL | Age: 48
End: 2023-05-19
Payer: MEDICAID

## 2023-05-19 VITALS
DIASTOLIC BLOOD PRESSURE: 88 MMHG | SYSTOLIC BLOOD PRESSURE: 143 MMHG | OXYGEN SATURATION: 100 % | WEIGHT: 175 LBS | TEMPERATURE: 98.8 F | BODY MASS INDEX: 29.16 KG/M2 | HEART RATE: 114 BPM | HEIGHT: 65 IN | RESPIRATION RATE: 18 BRPM
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
## 2023-05-19 LAB — GLUCOSE BLD STRIP.AUTO-MCNC: 169 MG/DL (ref 70–110)

## 2023-05-19 PROCEDURE — 2500000003 HC RX 250 WO HCPCS: Performed by: EMERGENCY MEDICINE

## 2023-05-19 PROCEDURE — 73590 X-RAY EXAM OF LOWER LEG: CPT

## 2023-05-19 PROCEDURE — 82962 GLUCOSE BLOOD TEST: CPT

## 2023-05-19 PROCEDURE — 12034 INTMD RPR S/TR/EXT 7.6-12.5: CPT

## 2023-05-19 PROCEDURE — 99283 EMERGENCY DEPT VISIT LOW MDM: CPT

## 2023-05-19 PROCEDURE — 6370000000 HC RX 637 (ALT 250 FOR IP): Performed by: EMERGENCY MEDICINE

## 2023-05-19 RX ORDER — AMOXICILLIN AND CLAVULANATE POTASSIUM 875; 125 MG/1; MG/1
1 TABLET, FILM COATED ORAL
Status: COMPLETED | OUTPATIENT
Start: 2023-05-19 | End: 2023-05-19

## 2023-05-19 RX ORDER — BACITRACIN ZINC 500 [USP'U]/G
OINTMENT TOPICAL ONCE
Status: COMPLETED | OUTPATIENT
Start: 2023-05-19 | End: 2023-05-19

## 2023-05-19 RX ORDER — AMOXICILLIN AND CLAVULANATE POTASSIUM 875; 125 MG/1; MG/1
1 TABLET, FILM COATED ORAL 2 TIMES DAILY
Qty: 20 TABLET | Refills: 0 | Status: SHIPPED | OUTPATIENT
Start: 2023-05-19 | End: 2023-05-29

## 2023-05-19 RX ORDER — OXYCODONE HYDROCHLORIDE AND ACETAMINOPHEN 5; 325 MG/1; MG/1
1 TABLET ORAL
Status: DISCONTINUED | OUTPATIENT
Start: 2023-05-19 | End: 2023-05-19

## 2023-05-19 RX ORDER — LIDOCAINE HYDROCHLORIDE 10 MG/ML
10 INJECTION, SOLUTION EPIDURAL; INFILTRATION; INTRACAUDAL; PERINEURAL
Status: DISCONTINUED | OUTPATIENT
Start: 2023-05-19 | End: 2023-05-19

## 2023-05-19 RX ORDER — LORAZEPAM 1 MG/1
1 TABLET ORAL EVERY 4 HOURS PRN
Status: DISCONTINUED | OUTPATIENT
Start: 2023-05-19 | End: 2023-05-19

## 2023-05-19 RX ORDER — MORPHINE SULFATE 4 MG/ML
4 INJECTION, SOLUTION INTRAMUSCULAR; INTRAVENOUS
Status: DISCONTINUED | OUTPATIENT
Start: 2023-05-19 | End: 2023-05-19 | Stop reason: HOSPADM

## 2023-05-19 RX ORDER — OXYCODONE HYDROCHLORIDE AND ACETAMINOPHEN 5; 325 MG/1; MG/1
1 TABLET ORAL EVERY 8 HOURS PRN
Qty: 9 TABLET | Refills: 0 | Status: SHIPPED | OUTPATIENT
Start: 2023-05-19 | End: 2023-05-22

## 2023-05-19 RX ORDER — 0.9 % SODIUM CHLORIDE 0.9 %
1000 INTRAVENOUS SOLUTION INTRAVENOUS ONCE
Status: DISCONTINUED | OUTPATIENT
Start: 2023-05-19 | End: 2023-05-19 | Stop reason: HOSPADM

## 2023-05-19 RX ORDER — LORAZEPAM 2 MG/ML
1 INJECTION INTRAMUSCULAR EVERY 6 HOURS PRN
Status: DISCONTINUED | OUTPATIENT
Start: 2023-05-19 | End: 2023-05-19 | Stop reason: HOSPADM

## 2023-05-19 RX ORDER — LIDOCAINE HYDROCHLORIDE 10 MG/ML
10 INJECTION, SOLUTION EPIDURAL; INFILTRATION; INTRACAUDAL; PERINEURAL
Status: COMPLETED | OUTPATIENT
Start: 2023-05-19 | End: 2023-05-19

## 2023-05-19 RX ORDER — ONDANSETRON 8 MG/1
8 TABLET, ORALLY DISINTEGRATING ORAL
Status: DISCONTINUED | OUTPATIENT
Start: 2023-05-19 | End: 2023-05-19

## 2023-05-19 RX ADMIN — OXYCODONE AND ACETAMINOPHEN 1 TABLET: 325; 5 TABLET ORAL at 03:00

## 2023-05-19 RX ADMIN — AMOXICILLIN AND CLAVULANATE POTASSIUM 1 TABLET: 875; 125 TABLET, FILM COATED ORAL at 04:50

## 2023-05-19 RX ADMIN — BACITRACIN ZINC: 500 OINTMENT TOPICAL at 04:50

## 2023-05-19 RX ADMIN — ONDANSETRON 8 MG: 8 TABLET, ORALLY DISINTEGRATING ORAL at 03:00

## 2023-05-19 RX ADMIN — LIDOCAINE HYDROCHLORIDE 10 ML: 10 INJECTION, SOLUTION EPIDURAL; INFILTRATION; INTRACAUDAL; PERINEURAL at 03:04

## 2023-05-19 ASSESSMENT — ENCOUNTER SYMPTOMS
RESPIRATORY NEGATIVE: 1
EYES NEGATIVE: 1
GASTROINTESTINAL NEGATIVE: 1
ALLERGIC/IMMUNOLOGIC NEGATIVE: 1

## 2023-05-19 NOTE — ED PROVIDER NOTES
mouth every 8 hours as needed for Pain for up to 3 days. Intended supply: 3 days. Take lowest dose possible to manage pain Max Daily Amount: 3 tablets     Controlled Substances Monitoring:     No flowsheet data found.     (Please note that portions of this note were completed with a voice recognition program.  Efforts were made to edit the dictations but occasionally words are mis-transcribed.)    ANIBAL Mosquera (electronically signed)  Attending Emergency Physician           Heath Giles, [address]  05/19/23 [id number]

## 2023-05-19 NOTE — ED NOTES
Pt d/cd to home awake, alert and in NAD. Vitals stable. Crutches given with instruction.        Jose Elias Soler RN  05/19/23 [id number]

## 2023-05-19 NOTE — ED NOTES
Pt received to ed bed 5 from triage with wounds to right lower extremity. 2 separate wounds noted. Pt states she was bitten by her own dog.   Pt smells of marijuana     Danae Greenberg, [address] [facility]  05/19/23 [id number]

## 2023-05-19 NOTE — ED NOTES
PT was trying to break up a dog fight and was bitten on her LT leg. Pt also c/o a headache that started shortly afterward.        Kyra Leal RN  05/19/23 [id number]

## 2023-05-21 ASSESSMENT — ENCOUNTER SYMPTOMS: SHORTNESS OF BREATH: 0

## 2023-05-23 ENCOUNTER — OFFICE VISIT (OUTPATIENT)
Facility: CLINIC | Age: 48
End: 2023-05-23
Payer: MEDICAID

## 2023-05-23 VITALS
WEIGHT: 175 LBS | RESPIRATION RATE: 15 BRPM | BODY MASS INDEX: 29.16 KG/M2 | DIASTOLIC BLOOD PRESSURE: 70 MMHG | HEART RATE: 85 BPM | OXYGEN SATURATION: 97 % | SYSTOLIC BLOOD PRESSURE: 113 MMHG | TEMPERATURE: 97.6 F | HEIGHT: 65 IN
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
## 2023-05-23 DIAGNOSIS — Z51.89 ENCOUNTER FOR WOUND CARE: ICD-10-CM
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
## 2023-05-23 PROCEDURE — 99214 OFFICE O/P EST MOD 30 MIN: CPT | Performed by: NURSE PRACTITIONER

## 2023-05-23 PROCEDURE — 3074F SYST BP LT 130 MM HG: CPT | Performed by: NURSE PRACTITIONER

## 2023-05-23 PROCEDURE — 3078F DIAST BP <80 MM HG: CPT | Performed by: NURSE PRACTITIONER

## 2023-05-23 RX ORDER — TRAMADOL HYDROCHLORIDE 50 MG/1
50 TABLET ORAL 2 TIMES DAILY PRN
Qty: 10 TABLET | Refills: 0 | Status: SHIPPED | OUTPATIENT
Start: 2023-05-23 | End: 2023-05-28

## 2023-05-23 SDOH — ECONOMIC STABILITY: FOOD INSECURITY: WITHIN THE PAST 12 MONTHS, YOU WORRIED THAT YOUR FOOD WOULD RUN OUT BEFORE YOU GOT MONEY TO BUY MORE.: NEVER TRUE

## 2023-05-23 SDOH — ECONOMIC STABILITY: FOOD INSECURITY: WITHIN THE PAST 12 MONTHS, THE FOOD YOU BOUGHT JUST DIDN'T LAST AND YOU DIDN'T HAVE MONEY TO GET MORE.: NEVER TRUE

## 2023-05-23 SDOH — ECONOMIC STABILITY: INCOME INSECURITY: HOW HARD IS IT FOR YOU TO PAY FOR THE VERY BASICS LIKE FOOD, HOUSING, MEDICAL CARE, AND HEATING?: NOT HARD AT ALL

## 2023-05-23 ASSESSMENT — PATIENT HEALTH QUESTIONNAIRE - PHQ9
SUM OF ALL RESPONSES TO PHQ QUESTIONS 1-9: 0
SUM OF ALL RESPONSES TO PHQ QUESTIONS 1-9: 0
2. FEELING DOWN, DEPRESSED OR HOPELESS: 0
SUM OF ALL RESPONSES TO PHQ QUESTIONS 1-9: 0
SUM OF ALL RESPONSES TO PHQ QUESTIONS 1-9: 0
1. LITTLE INTEREST OR PLEASURE IN DOING THINGS: 0
SUM OF ALL RESPONSES TO PHQ9 QUESTIONS 1 & 2: 0

## 2023-05-23 NOTE — PROGRESS NOTES
Rhonda Ha presents today for   Chief Complaint   Patient presents with    Other     Dog bite to right leg. Foot swollen and in pain. Patient states that she is out of pain medication. She states that she was given percocet however it did not work. Is someone accompanying this pt? no    Is the patient using any DME equipment during OV? no    Depression Screening:  PHQ-9 Questionaire 5/23/2023 5/17/2023 1/30/2023 11/2/2022 10/18/2022 4/5/2022 7/3/2019   Little interest or pleasure in doing things 0 0 0 0 0 0 0   Feeling down, depressed, or hopeless 0 0 0 0 0 0 0   PHQ-9 Total Score 0 0 0 0 0 0 0        IRENE 7-Anxiety   No flowsheet data found. Learning Assessment:  No question data found. Fall Risk  No flowsheet data found. Travel Screening:    Travel Screening       Question Response    In the last 10 days, have you been in contact with someone who was confirmed or suspected to have Coronavirus/COVID-19? --    Have you had a COVID-19 viral test in the last 10 days? No    Do you have any of the following new or worsening symptoms? None of these    Have you traveled internationally or domestically in the last month? No          Travel History   Travel since 04/23/23    No documented travel since 04/23/23          Health Maintenance reviewed and discussed and ordered per Provider. Social Determinants of Health     Tobacco Use: Medium Risk    Smoking Tobacco Use: Former    Smokeless Tobacco Use: Never    Passive Exposure: Not on file   Alcohol Use: Not on file   Financial Resource Strain: Low Risk     Difficulty of Paying Living Expenses: Not hard at all   Food Insecurity: No Food Insecurity    Worried About [address] in the Last Year: Never true    [address] in the Last Year: Never true   Transportation Needs: Unknown    Lack of Transportation (Medical): Not on file    Lack of Transportation (Non-Medical):  No   Physical Activity: Not on file   Stress: Not on file   Social

## 2023-05-23 NOTE — ASSESSMENT & PLAN NOTE
Increased pain today, arrangements made with general surgery tomorrow for further eval  Continue Augmentin, given limited supply of Tramadol for pain control, mupirocin ointment in the meantime  Defer suture removal to general surgery, as depth of wound is unknown  Wound care provided today, irrigated with normal saline and covered with sterile non-adherent pad, secured with Kerlix  Return to the ER for fever, chills, increased pain and swelling

## 2023-05-24 ENCOUNTER — OFFICE VISIT (OUTPATIENT)
Age: 48
End: 2023-05-24
Payer: MEDICAID

## 2023-05-24 VITALS
BODY MASS INDEX: 29.16 KG/M2 | RESPIRATION RATE: 18 BRPM | OXYGEN SATURATION: 99 % | SYSTOLIC BLOOD PRESSURE: 116 MMHG | WEIGHT: 175 LBS | DIASTOLIC BLOOD PRESSURE: 72 MMHG | HEART RATE: 74 BPM | TEMPERATURE: 98 F | HEIGHT: 65 IN

## 2023-05-24 DIAGNOSIS — I89.0 LYMPHEDEMA OF RIGHT LOWER EXTREMITY: ICD-10-CM

## 2023-05-24 DIAGNOSIS — S81.851A DOG BITE OF RIGHT LOWER LEG, INITIAL ENCOUNTER: Primary | ICD-10-CM
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
## 2023-05-24 PROCEDURE — 99203 OFFICE O/P NEW LOW 30 MIN: CPT | Performed by: SURGERY

## 2023-05-24 PROCEDURE — 3078F DIAST BP <80 MM HG: CPT | Performed by: SURGERY

## 2023-05-24 PROCEDURE — 3074F SYST BP LT 130 MM HG: CPT | Performed by: SURGERY

## 2023-05-24 NOTE — PROGRESS NOTES
atraumatic. Eyes:      Extraocular Movements: Extraocular movements intact. Conjunctiva/sclera: Conjunctivae normal.      Pupils: Pupils are equal, round, and reactive to light. Cardiovascular:      Rate and Rhythm: Normal rate. Pulmonary:      Effort: Pulmonary effort is normal.   Skin:     General: Skin is warm and dry. Findings: Bruising present. Comments: Right lower extremity calf puncture wound healing- moderately tender with bruising of the calf. Swelling noted from mid foot to calf with moderate tenderness. No crepitus. NO erythema, No drainage from her skin. 2 areas sutured from ER are well approximated, without erythema or drainage. Neurological:      General: No focal deficit present. Mental Status: She is alert and oriented to person, place, and time. Mental status is at baseline. Psychiatric:         Mood and Affect: Mood normal.         Behavior: Behavior normal.         Thought Content: Thought content normal.         Judgment: Judgment normal.       I have reviewed the information entered by the clinical staff and/or patient and verified it as accurate or edited where necessary.      Electronically signed by:    Ariela Hernandez DO, MPH

## 2023-05-25 ASSESSMENT — ENCOUNTER SYMPTOMS: COLOR CHANGE: 0

## 2023-05-31 ENCOUNTER — OFFICE VISIT (OUTPATIENT)
Age: 48
End: 2023-05-31

## 2023-05-31 ENCOUNTER — TELEPHONE (OUTPATIENT)
Facility: CLINIC | Age: 48
End: 2023-05-31

## 2023-05-31 VITALS
RESPIRATION RATE: 16 BRPM | OXYGEN SATURATION: 99 % | DIASTOLIC BLOOD PRESSURE: 76 MMHG | TEMPERATURE: 97.6 F | WEIGHT: 160 LBS | HEART RATE: 93 BPM | SYSTOLIC BLOOD PRESSURE: 106 MMHG | BODY MASS INDEX: 26.66 KG/M2 | HEIGHT: 65 IN

## 2023-05-31 DIAGNOSIS — I89.0 LYMPHEDEMA OF RIGHT LOWER EXTREMITY: Primary | ICD-10-CM
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
## 2023-05-31 NOTE — TELEPHONE ENCOUNTER
Patient called and said that the pain medication that you gave her is not working. She went to see general surgery today but they said that she has to wait to see physical therapy in order to get pain medication or she could call her PCP.  Please advise

## 2023-06-02 NOTE — PROGRESS NOTES
Status Report 04/26/2023 COMMENT   Final    Comment: [place] CMP14 Default  [place] CMP14 Default  A hand-written panel/profile was received from your office. In  accordance with the [facility] Ambiguous Test Code Policy dated July 0466, we have completed your order by using the closest currently  or formerly recognized AMA panel. We have assigned Comprehensive  Metabolic Panel (14), Test Code #292524 to this request.  If this  is not the testing you wished to receive on this specimen, please  contact the [address] [place] [address] Client Inquiry/Technical Services Department  to clarify the test order. We appreciate your business. Ambig Abbrev LP Default  Ambig Abbrev LP Default  A hand-written panel/profile was received from your office. In  accordance with the [facility] Ambiguous Test Code Policy dated July 1063, we have completed your order by using the closest currently  or formerly recognized AMA panel. We have assigned Lipid Panel,  Test Code #759589 to this request. If this is not the testing you  wished to receive on this specimen, plea                           se contact the [address] [place] [address]  Client Inquiry/Technical Services Department to clarify the test  order. We appreciate your business. Please note  Please note  The date and/or time of collection was not indicated on the  requisition as required by state and federal law. The date of  receipt of the specimen was used as the collection date if not  supplied.       Glucose 04/26/2023 80  70 - 99 mg/dL Final    BUN 04/26/2023 12  6 - 24 mg/dL Final    Creatinine 04/26/2023 0.54 (L)  0.57 - 1.00 mg/dL Final    Est, Glomerular Filtration Rate 04/26/2023 115  >59 mL/min/1.73 Final    BUN/Creatinine Ratio 04/26/2023 22  9 - 23 Final    Sodium 04/26/2023 138  134 - 144 mmol/L Final    Potassium 04/26/2023 3.8  3.5 - 5.2 mmol/L Final    Chloride 04/26/2023 102  96 - 106 mmol/L Final    CO2 04/26/2023 21  20 - 29 mmol/L Final    Calcium 04/26/2023 8.6 (L)  8.7 - 10.2 mg/dL

## 2023-06-04 ASSESSMENT — ENCOUNTER SYMPTOMS: SHORTNESS OF BREATH: 0

## 2023-06-05 RX ORDER — TRAMADOL HYDROCHLORIDE 50 MG/1
50 TABLET ORAL EVERY 4 HOURS PRN
Qty: 10 TABLET | Refills: 0 | OUTPATIENT
Start: 2023-06-05 | End: 2023-06-08

## 2023-06-05 NOTE — TELEPHONE ENCOUNTER
Patient was told when this was prescribed that it is not a refillable medication. She may take ibuprofen 400 mg OTC TID PRN for pain. Thank you.

## 2023-06-05 NOTE — TELEPHONE ENCOUNTER
Patient called and said that she need a refill for pain medication. She has been told by her specialist office that she need to call her PCP for pain medication.  Please advise       Tramadol 50 mg

## 2023-06-05 NOTE — TELEPHONE ENCOUNTER
Pt was informed Tramadol isn't a refillable rx and that PARUL Leonard recommended for her to take Motrin 400mg TID prn. Pt was reminded of her appt 6/6/23.

## 2023-06-06 ENCOUNTER — TELEMEDICINE (OUTPATIENT)
Facility: CLINIC | Age: 48
End: 2023-06-06
Payer: MEDICAID
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
## 2023-06-06 DIAGNOSIS — I89.0 LYMPHEDEMA OF LEFT LEG: ICD-10-CM

## 2023-06-06 PROCEDURE — 99215 OFFICE O/P EST HI 40 MIN: CPT | Performed by: NURSE PRACTITIONER

## 2023-06-06 RX ORDER — IBUPROFEN 800 MG/1
800 TABLET ORAL EVERY 8 HOURS PRN
Qty: 90 TABLET | Refills: 0 | Status: SHIPPED | OUTPATIENT
Start: 2023-06-06

## 2023-06-06 ASSESSMENT — PATIENT HEALTH QUESTIONNAIRE - PHQ9
SUM OF ALL RESPONSES TO PHQ QUESTIONS 1-9: 0
2. FEELING DOWN, DEPRESSED OR HOPELESS: 0
1. LITTLE INTEREST OR PLEASURE IN DOING THINGS: 0
SUM OF ALL RESPONSES TO PHQ QUESTIONS 1-9: 0
SUM OF ALL RESPONSES TO PHQ9 QUESTIONS 1 & 2: 0
SUM OF ALL RESPONSES TO PHQ QUESTIONS 1-9: 0
SUM OF ALL RESPONSES TO PHQ QUESTIONS 1-9: 0

## 2023-06-06 NOTE — ASSESSMENT & PLAN NOTE
General surgery notes reviewed  Pain remains uncontrolled, advised that Tramadol is not a refillable medication for acute pain  Will give Ibuprofen 800 mg TID PRN, take with food and DO NOT take any other ibuprofen products OTC  Re-evaluate in 6 weeks

## 2023-06-06 NOTE — ASSESSMENT & PLAN NOTE
General surgery notes reviewed, ER xray negative for fracture  New-onset, secondary to dog bite  Recommend elevation  Consult vascular for further eval

## 2023-06-06 NOTE — PROGRESS NOTES
Almond Cowden presents today for   Chief Complaint   Patient presents with    Leg Pain     Patient is complaining of pain in right leg due to dog bite. She states that she need some pain medication which was refused by general surgeon. She states that Ibuprofen is not working. Patient states that her leg is still swelling and she is unable to feel her ankle due to numbness. Red and yellow fluid is leaking out of her wound with no smell. Patient states that she is in constant pain. Virtual/telephone visit    Depression Screening:  PHQ-9 Questionaire 5/23/2023 5/17/2023 1/30/2023 11/2/2022 10/18/2022 4/5/2022 7/3/2019   Little interest or pleasure in doing things 0 0 0 0 0 0 0   Feeling down, depressed, or hopeless 0 0 0 0 0 0 0   PHQ-9 Total Score 0 0 0 0 0 0 0         Learning Assessment:  No question data found. Fall Risk  No flowsheet data found. Travel Screening:    Travel Screening     No screening recorded since 06/05/23 0000       Travel History   Travel since 05/06/23    No documented travel since 05/06/23         Health Maintenance reviewed and discussed and ordered per Provider. Health Maintenance Due   Topic Date Due    COVID-19 Vaccine (3 - Booster for Moderna series) 10/03/2021   . Coordination of Care:    1. Have you been to the ER, urgent care clinic since your last visit? Hospitalized since your last visit? no    2. Have you seen or consulted any other health care providers outside of the [address] [place] [address] since your last visit? Include any pap smears or colon screening.  no

## 2023-06-07 ENCOUNTER — APPOINTMENT (OUTPATIENT)
Facility: HOSPITAL | Age: 48
End: 2023-06-07
Payer: MEDICAID

## 2023-06-07 ENCOUNTER — HOSPITAL ENCOUNTER (EMERGENCY)
Facility: HOSPITAL | Age: 48
Discharge: HOME | End: 2023-06-07
Attending: EMERGENCY MEDICINE
Payer: MEDICAID

## 2023-06-07 VITALS
SYSTOLIC BLOOD PRESSURE: 139 MMHG | RESPIRATION RATE: 16 BRPM | DIASTOLIC BLOOD PRESSURE: 86 MMHG | HEART RATE: 95 BPM | TEMPERATURE: 98.9 F | OXYGEN SATURATION: 99 %

## 2023-06-07 DIAGNOSIS — S81.851A DOG BITE OF RIGHT LOWER LEG, INITIAL ENCOUNTER: ICD-10-CM

## 2023-06-07 DIAGNOSIS — L03.115 CELLULITIS OF RIGHT LOWER EXTREMITY: Primary | ICD-10-CM
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
## 2023-06-07 LAB
ALBUMIN SERPL-MCNC: 3.8 G/DL (ref 3.4–5)
ALBUMIN/GLOB SERPL: 0.8 (ref 0.8–1.7)
ALP SERPL-CCNC: 92 U/L (ref 45–117)
ALT SERPL-CCNC: 225 U/L (ref 13–56)
ANION GAP SERPL CALC-SCNC: 2 MMOL/L (ref 3–18)
AST SERPL-CCNC: 91 U/L (ref 10–38)
BASOPHILS # BLD: 0 K/UL (ref 0–0.1)
BASOPHILS NFR BLD: 1 % (ref 0–2)
BILIRUB SERPL-MCNC: 0.5 MG/DL (ref 0.2–1)
BUN SERPL-MCNC: 9 MG/DL (ref 7–18)
BUN/CREAT SERPL: 14 (ref 12–20)
CALCIUM SERPL-MCNC: 9.4 MG/DL (ref 8.5–10.1)
CHLORIDE SERPL-SCNC: 107 MMOL/L (ref 100–111)
CO2 SERPL-SCNC: 30 MMOL/L (ref 21–32)
CREAT SERPL-MCNC: 0.64 MG/DL (ref 0.6–1.3)
DIFFERENTIAL METHOD BLD: ABNORMAL
EOSINOPHIL # BLD: 0.1 K/UL (ref 0–0.4)
EOSINOPHIL NFR BLD: 1 % (ref 0–5)
ERYTHROCYTE [DISTWIDTH] IN BLOOD BY AUTOMATED COUNT: 13.7 % (ref 11.6–14.5)
GLOBULIN SER CALC-MCNC: 4.6 G/DL (ref 2–4)
GLUCOSE SERPL-MCNC: 106 MG/DL (ref 74–99)
HCT VFR BLD AUTO: 36.7 % (ref 35–45)
HGB BLD-MCNC: 12.4 G/DL (ref 12–16)
IMM GRANULOCYTES # BLD AUTO: 0 K/UL (ref 0–0.04)
IMM GRANULOCYTES NFR BLD AUTO: 0 % (ref 0–0.5)
LACTATE SERPL-SCNC: 0.7 MMOL/L (ref 0.4–2)
LYMPHOCYTES # BLD: 2.7 K/UL (ref 0.9–3.6)
LYMPHOCYTES NFR BLD: 32 % (ref 21–52)
MCH RBC QN AUTO: 29.6 PG (ref 24–34)
MCHC RBC AUTO-ENTMCNC: 33.8 G/DL (ref 31–37)
MCV RBC AUTO: 87.6 FL (ref 78–100)
MONOCYTES # BLD: 0.5 K/UL (ref 0.05–1.2)
MONOCYTES NFR BLD: 6 % (ref 3–10)
NEUTS SEG # BLD: 5.1 K/UL (ref 1.8–8)
NEUTS SEG NFR BLD: 60 % (ref 40–73)
NRBC # BLD: 0 K/UL (ref 0–0.01)
NRBC BLD-RTO: 0 PER 100 WBC
PLATELET # BLD AUTO: 271 K/UL (ref 135–420)
PMV BLD AUTO: 9.1 FL (ref 9.2–11.8)
POTASSIUM SERPL-SCNC: 3.4 MMOL/L (ref 3.5–5.5)
PROT SERPL-MCNC: 8.4 G/DL (ref 6.4–8.2)
RBC # BLD AUTO: 4.19 M/UL (ref 4.2–5.3)
SODIUM SERPL-SCNC: 139 MMOL/L (ref 136–145)
WBC # BLD AUTO: 8.4 K/UL (ref 4.6–13.2)

## 2023-06-07 PROCEDURE — 99285 EMERGENCY DEPT VISIT HI MDM: CPT

## 2023-06-07 PROCEDURE — 6360000002 HC RX W HCPCS

## 2023-06-07 PROCEDURE — 80053 COMPREHEN METABOLIC PANEL: CPT

## 2023-06-07 PROCEDURE — 73590 X-RAY EXAM OF LOWER LEG: CPT

## 2023-06-07 PROCEDURE — 85025 COMPLETE CBC W/AUTO DIFF WBC: CPT

## 2023-06-07 PROCEDURE — 96374 THER/PROPH/DIAG INJ IV PUSH: CPT

## 2023-06-07 PROCEDURE — 6370000000 HC RX 637 (ALT 250 FOR IP)

## 2023-06-07 PROCEDURE — 83605 ASSAY OF LACTIC ACID: CPT

## 2023-06-07 PROCEDURE — 73701 CT LOWER EXTREMITY W/DYE: CPT

## 2023-06-07 PROCEDURE — 6360000004 HC RX CONTRAST MEDICATION

## 2023-06-07 PROCEDURE — 73600 X-RAY EXAM OF ANKLE: CPT

## 2023-06-07 RX ORDER — SULFAMETHOXAZOLE AND TRIMETHOPRIM 800; 160 MG/1; MG/1
2 TABLET ORAL 2 TIMES DAILY
Qty: 40 TABLET | Refills: 0 | Status: SHIPPED | OUTPATIENT
Start: 2023-06-07 | End: 2023-06-17

## 2023-06-07 RX ORDER — HYDROCODONE BITARTRATE AND ACETAMINOPHEN 5; 325 MG/1; MG/1
1 TABLET ORAL EVERY 6 HOURS PRN
Qty: 10 TABLET | Refills: 0 | Status: SHIPPED | OUTPATIENT
Start: 2023-06-07 | End: 2023-06-10

## 2023-06-07 RX ORDER — ONDANSETRON 4 MG/1
4 TABLET, ORALLY DISINTEGRATING ORAL ONCE
Status: COMPLETED | OUTPATIENT
Start: 2023-06-07 | End: 2023-06-07

## 2023-06-07 RX ORDER — CLINDAMYCIN HYDROCHLORIDE 300 MG/1
300 CAPSULE ORAL 3 TIMES DAILY
Qty: 30 CAPSULE | Refills: 0 | Status: SHIPPED | OUTPATIENT
Start: 2023-06-07 | End: 2023-06-17

## 2023-06-07 RX ORDER — MORPHINE SULFATE 4 MG/ML
4 INJECTION, SOLUTION INTRAMUSCULAR; INTRAVENOUS
Status: COMPLETED | OUTPATIENT
Start: 2023-06-07 | End: 2023-06-07

## 2023-06-07 RX ADMIN — IOPAMIDOL 70 ML: 612 INJECTION, SOLUTION INTRAVENOUS at 12:55

## 2023-06-07 RX ADMIN — MORPHINE SULFATE 4 MG: 4 INJECTION, SOLUTION INTRAMUSCULAR; INTRAVENOUS at 12:23

## 2023-06-07 RX ADMIN — ONDANSETRON 4 MG: 4 TABLET, ORALLY DISINTEGRATING ORAL at 12:23

## 2023-06-07 ASSESSMENT — ENCOUNTER SYMPTOMS
NAUSEA: 1
COLOR CHANGE: 1
VOMITING: 0
CHEST TIGHTNESS: 0
COUGH: 0
SHORTNESS OF BREATH: 0
DIARRHEA: 0
BACK PAIN: 0
ABDOMINAL PAIN: 0

## 2023-06-07 ASSESSMENT — PAIN DESCRIPTION - ORIENTATION: ORIENTATION: RIGHT

## 2023-06-07 ASSESSMENT — PAIN SCALES - GENERAL: PAINLEVEL_OUTOF10: 10

## 2023-06-07 ASSESSMENT — PAIN DESCRIPTION - LOCATION: LOCATION: LEG

## 2023-06-07 NOTE — ED PROVIDER NOTES
normal.      Pupils: Pupils are equal, round, and reactive to light. Cardiovascular:      Rate and Rhythm: Normal rate and regular rhythm. Pulses: Normal pulses. Heart sounds: Normal heart sounds. Pulmonary:      Effort: Pulmonary effort is normal.      Breath sounds: Normal breath sounds. Abdominal:      General: Abdomen is flat. Bowel sounds are normal.      Palpations: Abdomen is soft. Tenderness: There is no abdominal tenderness. Musculoskeletal:         General: Swelling (of the right foot up to the mid shin) and tenderness (over the medial and inferior aspect of the right ankle, extending upwards along the medial and posterior calf, specifically over prior lacations) present. No deformity. Comments: No crepitus appreciated throughout the entire right lower extremity. No bony tenderness appreciated. Skin:     Capillary Refill: Capillary refill takes less than 2 seconds. Comments: ~1 cm dehisced area over the right posterior calf. ~5 cm healing laceration over the medial anterior aspect of the right lower leg, that is intact with mild warmth and surrounding erythema.  ~3 cm well-healed laceration over the right posterior calf without surrounding warmth or erythema. Neurological:      General: No focal deficit present. Mental Status: She is alert. Sensory: No sensory deficit. DIAGNOSTIC RESULTS     RADIOLOGY:   Non-plain film images such as CT, Ultrasound and MRI are read by the radiologist. Plain radiographic images are visualized and preliminarily interpreted by the emergency physician with the below findings:    No obvious bony abnormalities, clearly defined muscular borders concern for possible tracking gas. Interpretation per the Radiologist below, if available at the time of this note:    CT LOWER EXTREMITY W CONTRAST   Final Result      Superficial fat edema or inflammation, greatest at the distal lower extremity   and foot.  No abscess or soft

## 2023-06-07 NOTE — DISCHARGE INSTRUCTIONS
You were seen in the emergency department for evaluation of lower leg pain and swelling. Both x-rays and CT scan found no signs of deeper infection, or abscess. Your blood work is overall reassuring. You will be discharged home with 2 different antibiotics, please take as directed and for the entire 10-day course. Please return to the nearest emergency department if you develop fevers, chills, or new/worsening leg pain or swelling. Of note two of your laboratory studies were slightly abnormal, the AST and ALT, which are markers of liver function. However, the rest of your liver function numbers were within normal limits. Recommend to follow-up with your primary care provider regarding these abnormalities, and have them rechecked.

## 2023-06-07 NOTE — ED TRIAGE NOTES
Pt arrived with c/o of right leg pain, right ankle and foot swelling, and open sore on back of right calf. PT was bit by a dog on 5/19. Pt states pain has been getting increasingly worse since stitches were removed on 5/31.

## 2023-06-29 ENCOUNTER — OFFICE VISIT (OUTPATIENT)
Age: 48
End: 2023-06-29
Payer: MEDICAID

## 2023-06-29 VITALS
DIASTOLIC BLOOD PRESSURE: 62 MMHG | BODY MASS INDEX: 26.66 KG/M2 | SYSTOLIC BLOOD PRESSURE: 110 MMHG | WEIGHT: 160 LBS | HEIGHT: 65 IN

## 2023-06-29 DIAGNOSIS — R60.0 LOCALIZED EDEMA: Primary | ICD-10-CM
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
## 2023-06-29 PROCEDURE — 3078F DIAST BP <80 MM HG: CPT | Performed by: SURGERY

## 2023-06-29 PROCEDURE — 3074F SYST BP LT 130 MM HG: CPT | Performed by: SURGERY

## 2023-06-29 PROCEDURE — 99204 OFFICE O/P NEW MOD 45 MIN: CPT | Performed by: SURGERY

## 2023-07-05 ENCOUNTER — TELEPHONE (OUTPATIENT)
Facility: CLINIC | Age: 48
End: 2023-07-05

## 2023-07-05 NOTE — TELEPHONE ENCOUNTER
----- Message from [address] sent at 6/29/2023  2:30 PM EDT -----  Subject: Message to Provider    QUESTIONS  Information for Provider? Patient would like to get a prescription from   the doctor for a knee scooter. Would she need to schedule an appointment   for this? Please advise.  ---------------------------------------------------------------------------  --------------  Connie Saldivar [facility]  [phone]; OK to leave message on voicemail  ---------------------------------------------------------------------------  --------------  SCRIPT ANSWERS  Relationship to Patient?  Self

## 2023-07-06 NOTE — TELEPHONE ENCOUNTER
Patient spoke with her insurance and they told her you would need to send a prescription and complete a certificate of Medical Necessity for a knee scooter. Unable to walk with crutches because it is making her arms raw. Phone#  [phone]    She does not know where these number's are to, was given them by her insurance. I told her that just because the insurance told her this does not mean it will be covered.

## 2023-07-11 DIAGNOSIS — I89.0 LYMPHEDEMA OF LEFT LEG: ICD-10-CM
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
## 2023-07-12 ENCOUNTER — TELEPHONE (OUTPATIENT)
Facility: CLINIC | Age: 48
End: 2023-07-12

## 2023-07-12 ENCOUNTER — OFFICE VISIT (OUTPATIENT)
Age: 48
End: 2023-07-12

## 2023-07-12 VITALS — BODY MASS INDEX: 26.63 KG/M2 | HEIGHT: 65 IN | TEMPERATURE: 97.8 F

## 2023-07-12 DIAGNOSIS — R20.0 NUMBNESS AND TINGLING IN BOTH HANDS: Primary | ICD-10-CM

## 2023-07-12 DIAGNOSIS — R20.2 NUMBNESS AND TINGLING IN BOTH HANDS: Primary | ICD-10-CM

## 2023-07-12 DIAGNOSIS — Z86.73 HISTORY OF TIAS: ICD-10-CM

## 2023-07-12 RX ORDER — IBUPROFEN 800 MG/1
800 TABLET ORAL EVERY 8 HOURS PRN
Qty: 90 TABLET | Refills: 0 | Status: SHIPPED | OUTPATIENT
Start: 2023-07-12

## 2023-07-12 NOTE — TELEPHONE ENCOUNTER
Patient called and would like to know if we received her disability paperwork from her insurance company. Her callback number is [phone]. Please advise.

## 2023-07-12 NOTE — PROGRESS NOTES
Roro Ann is a 57 y.o. female right handed. Worker's Compensation and legal considerations: none    Chief Complaint   Patient presents with    Hand Pain     Bilateral hand pain     Pain Score:   0 - No pain    HPI: Patient presents today with complaints of bilateral hand numbness and tingling. She reports left side started a few years ago when she started having TIAs. She reports the right side more recently. Date of onset: Indeterminate  Injury: No  Prior Treatment:  No    ROS: Review of Systems - General ROS: negative except HPI    Past Medical History:   Diagnosis Date    Breast cancer screening by mammogram 08/16/2019    Breast biopsy benign. May resume routine annual screenings. Hypercholesterolemia     Hypertension     TIA (transient ischemic attack)        Past Surgical History:   Procedure Laterality Date    ABDOMEN SURGERY      BREAST BIOPSY Right 6/2006    Right-benign    COLONOSCOPY N/A 2/15/2023    COLONOSCOPY performed by Hui Sarmiento MD at [address] Right 03/2017    Pelvic surgery possibly ovary    OTHER SURGICAL HISTORY  1/2013    left neck cyst removal    OVARY REMOVAL Left         Current Outpatient Medications   Medication Sig Dispense Refill    ibuprofen (ADVIL;MOTRIN) 800 MG tablet Take 1 tablet by mouth every 8 hours as needed for Pain 90 tablet 0    mupirocin (BACTROBAN) 2 % ointment Apply topically 3 times daily. 1 g 0    venlafaxine (EFFEXOR XR) 37.5 MG extended release capsule Take 1 capsule by mouth daily 90 capsule 0    atorvastatin (LIPITOR) 80 MG tablet TAKE ONE TABLET BY MOUTH ONCE NIGHTLY 90 tablet 0    hydroCHLOROthiazide (MICROZIDE) 12.5 MG capsule TAKE 1 CAPSULE BY MOUTH DAILY FOR HIGH BLOOD PRESSURE 90 capsule 1    amLODIPine (NORVASC) 10 MG tablet TAKE ONE TABLET BY MOUTH DAILY 90 tablet 1    aspirin 81 MG EC tablet Take 2 tablets by mouth daily       No current facility-administered medications for this visit.        No Known

## 2023-07-12 NOTE — TELEPHONE ENCOUNTER
Spoke w/ pt and informed her we did not receive paperwork. Requested for her to refax and make appt.

## 2023-07-13 ENCOUNTER — OFFICE VISIT (OUTPATIENT)
Facility: CLINIC | Age: 48
End: 2023-07-13

## 2023-07-13 VITALS
OXYGEN SATURATION: 98 % | WEIGHT: 158 LBS | BODY MASS INDEX: 26.33 KG/M2 | TEMPERATURE: 98.1 F | RESPIRATION RATE: 16 BRPM | DIASTOLIC BLOOD PRESSURE: 78 MMHG | HEIGHT: 65 IN | HEART RATE: 79 BPM | SYSTOLIC BLOOD PRESSURE: 114 MMHG
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
## 2023-07-13 DIAGNOSIS — Z02.89 ENCOUNTER FOR COMPLETION OF FORM WITH PATIENT: ICD-10-CM

## 2023-07-13 DIAGNOSIS — I89.0 LYMPHEDEMA OF LEFT LEG: ICD-10-CM

## 2023-07-13 DIAGNOSIS — Z51.89 ENCOUNTER FOR WOUND CARE: ICD-10-CM

## 2023-07-13 ASSESSMENT — PATIENT HEALTH QUESTIONNAIRE - PHQ9
2. FEELING DOWN, DEPRESSED OR HOPELESS: 0
1. LITTLE INTEREST OR PLEASURE IN DOING THINGS: 0
SUM OF ALL RESPONSES TO PHQ QUESTIONS 1-9: 0
SUM OF ALL RESPONSES TO PHQ9 QUESTIONS 1 & 2: 0

## 2023-07-13 ASSESSMENT — ENCOUNTER SYMPTOMS: SHORTNESS OF BREATH: 0

## 2023-07-13 NOTE — PATIENT INSTRUCTIONS
[facility] at [address] [facility] [address]   [address] [facility], [facility] [address]   [phone]

## 2023-07-13 NOTE — PROGRESS NOTES
Ann Newman presents today for   Chief Complaint   Patient presents with    Animal Bite     Right leg swelling. She states that even to touch the wound it's tender and hot. She states that her pain rate 8/10. Constantly in pain. Is someone accompanying this pt? no    Is the patient using any DME equipment during [address]? Yes patient uses crutches    Depression Screening:  PHQ-9 Questionaire 7/13/2023 6/6/2023 5/23/2023 5/17/2023 1/30/2023 11/2/2022 10/18/2022   Little interest or pleasure in doing things 0 0 0 0 0 0 0   Feeling down, depressed, or hopeless 0 0 0 0 0 0 0   PHQ-9 Total Score 0 0 0 0 0 0 0        IRENE 7-Anxiety   No flowsheet data found. Learning Assessment:  No question data found. Fall Risk  No flowsheet data found. Travel Screening:    Travel Screening     No screening recorded since 07/12/23 0000       Travel History   Travel since 06/13/23    No documented travel since 06/13/23          Health Maintenance reviewed and discussed and ordered per Provider. Social Determinants of Health     Tobacco Use: Medium Risk    Smoking Tobacco Use: Former    Smokeless Tobacco Use: Never    Passive Exposure: Not on file   Alcohol Use: Not on file   Financial Resource Strain: Low Risk     Difficulty of Paying Living Expenses: Not hard at all   Food Insecurity: No Food Insecurity    Worried About [place] in the Last Year: Never true    [address] in the Last Year: Never true   Transportation Needs: Unknown    Lack of Transportation (Medical): Not on file    Lack of Transportation (Non-Medical):  No   Physical Activity: Not on file   Stress: Not on file   Social Connections: Not on file   Intimate Partner Violence: Not on file   Depression: Not at risk    PHQ-2 Score: 0   Housing Stability: Unknown    Unable to Pay for Housing in the Last Year: Not on file    Number of Places Lived in the Last Year: Not on file    Unstable Housing in the Last Year: No        Health

## 2023-07-13 NOTE — PROGRESS NOTES
Chief Complaint   Patient presents with    Animal Bite     Right leg swelling. She states that even to touch the wound it's tender and hot. She states that her pain rate 8/10. Constantly in pain. Assessment & Plan:     1. Dog bite, subsequent encounter  Assessment & Plan:  Healing well but pain persists  Advised that order for knee scooter may not be approved by her insurance  Wound care provided today  It is undetermined how long patient will be back to baseline as far as use of affected leg  Disability form completed today, tentative return to work in November, pending PT evaluation  Re-evaluate wounds in 6 weeks  Orders:  -     DME Order for [employer]) as OP  2. Lymphedema of left leg  Assessment & Plan:  Improved but pain persists, continue plans for PT  Patient given correct contact information for [facility]  Orders:  -     DME Order for [employer]) as OP  3. Encounter for completion of form with patient  Comments:  Plan return to work on 11/19/2023, pending PT evaluation  4.  Encounter for wound care  Comments:  Irrigated with saline, covered by non-adherent gauze and regular sterile 4x4 gauze, secured with Kerlix and ACE bandage      Follow-up and Dispositions    Return in about 6 weeks (around 8/24/2023) for dog bite, lymphedema THEN  Return in 4 mos for physical, lab results       Subjective:     HPI    Dog Bite -  05/23/2023:  Was seen in the ER on 05/19/2023  Dog bite is on the right leg  Was given Augmentin x10 days  Was given Percocet but this did not work  She has developed some chills but no fever  Increased pain, rates today 10/10  Shots up to date on dogs who bit her  06/06/2023:  Presents for dog bite follow up of RLE  Evaluated by Dr. Vance Jenkins, had sutures removed on 05/31/2023  Recommended PT  Patient continues to have pain, completed her Tramadol supply  Has been taking ibuprofen OTC which is not effective  Also c/o numbness  She has been soaking her left foot  Had xray of her leg when

## 2023-07-14 NOTE — ASSESSMENT & PLAN NOTE
Improved but pain persists, continue plans for PT  Patient given correct contact information for [facility] PT

## 2023-07-14 NOTE — ASSESSMENT & PLAN NOTE
Healing well but pain persists  Advised that order for knee scooter may not be approved by her insurance  Wound care provided today  It is undetermined how long patient will be back to baseline as far as use of affected leg  Disability form completed today, tentative return to work in November, pending PT evaluation  Re-evaluate wounds in 6 weeks

## 2023-07-16 NOTE — TELEPHONE ENCOUNTER
30 days supply for now until labs done 1 week prior to next scheduled appointment. Please call and advise patient. Thanks. normal...

## 2023-07-17 ENCOUNTER — TELEPHONE (OUTPATIENT)
Facility: CLINIC | Age: 48
End: 2023-07-17

## 2023-07-24 ENCOUNTER — TELEPHONE (OUTPATIENT)
Facility: CLINIC | Age: 48
End: 2023-07-24

## 2023-07-27 ENCOUNTER — TELEPHONE (OUTPATIENT)
Facility: CLINIC | Age: 48
End: 2023-07-27

## 2023-07-27 NOTE — TELEPHONE ENCOUNTER
Pt called inquiring about her paperwork that was given to us at her last visit. Pt states [employer] is still waiting on it. Pt is needing this asap. Pt adamant about speaking with nurse in ref to this. Please advise.

## 2023-07-28 ENCOUNTER — TELEPHONE (OUTPATIENT)
Age: 48
End: 2023-07-28

## 2023-07-28 ENCOUNTER — TELEPHONE (OUTPATIENT)
Facility: CLINIC | Age: 48
End: 2023-07-28

## 2023-07-28 DIAGNOSIS — R20.2 NUMBNESS AND TINGLING OF RIGHT ARM: Primary | ICD-10-CM

## 2023-07-28 DIAGNOSIS — R20.0 NUMBNESS AND TINGLING OF RIGHT ARM: Primary | ICD-10-CM

## 2023-07-28 NOTE — TELEPHONE ENCOUNTER
Pt needs a new order for an EEG. Please advise. PT also mentioned she had a light seizure last night.

## 2023-08-02 ENCOUNTER — OFFICE VISIT (OUTPATIENT)
Facility: CLINIC | Age: 48
End: 2023-08-02

## 2023-08-02 ENCOUNTER — TELEPHONE (OUTPATIENT)
Facility: CLINIC | Age: 48
End: 2023-08-02

## 2023-08-02 VITALS
WEIGHT: 162 LBS | SYSTOLIC BLOOD PRESSURE: 136 MMHG | BODY MASS INDEX: 26.99 KG/M2 | HEIGHT: 65 IN | RESPIRATION RATE: 18 BRPM | HEART RATE: 72 BPM | TEMPERATURE: 98 F | DIASTOLIC BLOOD PRESSURE: 89 MMHG | OXYGEN SATURATION: 100 %
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
## 2023-08-02 DIAGNOSIS — M25.571 ACUTE RIGHT ANKLE PAIN: ICD-10-CM

## 2023-08-02 DIAGNOSIS — Z02.89 ENCOUNTER FOR COMPLETION OF FORM WITH PATIENT: ICD-10-CM

## 2023-08-02 ASSESSMENT — ENCOUNTER SYMPTOMS: SHORTNESS OF BREATH: 0

## 2023-08-02 NOTE — PROGRESS NOTES
Chief Complaint   Patient presents with    Other     paperwork    Leg Pain     Pt would like a wedge pillow for her leg because the pain is unbearable at night     Medication Problem     Pt wants to be Rx a new pain med, Ibuprofen not working     Assessment & Plan:     1. Dog bite, subsequent encounter  Assessment & Plan:  Wound continues to drain, consult wound care  Unable to bear weight on affected leg, continue knee scooter for ambulation  Disability form completed today, faxed, patient given copy  Advised importance of calling PT as previously advised  Will need PT recommendations on activity restrictions once completed  Re-evaluate as scheduled on 08/24/2023  Orders:  -     DME Order for [facility]) as OP  -     External Referral To Wound Clinic  2. Acute right ankle pain  Comments: Worsening, consult ortho  Orders:  -     [address] [place], Orthopedic Surgery(General/Foot & Ankle), [facility] ([address])  3. Encounter for completion of form with patient  Comments:  Disability form completed, faxed, patient given copy    Follow-up and Dispositions    Return in 22 days (on 8/24/2023) for dog bite.        Subjective:     HPI    Dog Bite -  05/23/2023:  Was seen in the ER on 05/19/2023  Dog bite is on the right leg  Was given Augmentin x10 days  Was given Percocet but this did not work  She has developed some chills but no fever  Increased pain, rates today 10/10  Shots up to date on dogs who bit her  06/06/2023:  Presents for dog bite follow up of RLE  Evaluated by Dr. Merrick Mcgarw, had sutures removed on 05/31/2023  Recommended PT  Patient continues to have pain, completed her Tramadol supply  Has been taking ibuprofen OTC which is not effective  Also c/o numbness  She has been soaking her left foot  Had xray of her leg when she was in the ER on 05/19, wants to know the results  07/13/2023:  She presents today with short-term disability form for her dog bite  She was referred to PT by general surgery

## 2023-08-02 NOTE — PROGRESS NOTES
Carleen Castro presents today for   Chief Complaint   Patient presents with    Other     paperwork    Leg Pain     Pt would like a wedge pillow for her leg because the pain is unbearable at night     Medication Problem     Pt wants to be Rx a new pain med, Ibuprofen not working       Is someone accompanying this pt? no    Is the patient using any DME equipment during OV? no    Depression Screening:  PHQ-9 Questionaire 8/2/2023 7/13/2023 6/6/2023 5/23/2023 5/17/2023 1/30/2023 11/2/2022   Little interest or pleasure in doing things 0 0 0 0 0 0 0   Feeling down, depressed, or hopeless 0 0 0 0 0 0 0   PHQ-9 Total Score 0 0 0 0 0 0 0        IRENE 7-Anxiety   No flowsheet data found. Learning Assessment:  No question data found. Fall Risk  No flowsheet data found. Travel Screening:    Travel Screening       Question Response    In the last 10 days, have you been in contact with someone who was confirmed or suspected to have Coronavirus/COVID-19? --    Have you had a COVID-19 viral test in the last 10 days? No    Do you have any of the following new or worsening symptoms? None of these    Have you traveled internationally or domestically in the last month? No          Travel History   Travel since 07/02/23    No documented travel since 07/02/23          Health Maintenance reviewed and discussed and ordered per Provider. Social Determinants of Health     Tobacco Use: Medium Risk    Smoking Tobacco Use: Former    Smokeless Tobacco Use: Never    Passive Exposure: Not on file   Alcohol Use: Not on file   Financial Resource Strain: Low Risk     Difficulty of Paying Living Expenses: Not hard at all   Food Insecurity: No Food Insecurity    Worried About [place] in the Last Year: Never true    [address] in the Last Year: Never true   Transportation Needs: Unknown    Lack of Transportation (Medical): Not on file    Lack of Transportation (Non-Medical):  No   Physical Activity: Not on file

## 2023-08-02 NOTE — ASSESSMENT & PLAN NOTE
Wound continues to drain, consult wound care  Unable to bear weight on affected leg, continue knee scooter for ambulation  Disability form completed today, faxed, patient given copy  Advised importance of calling PT as previously advised  Will need PT recommendations on activity restrictions once completed  Re-evaluate as scheduled on 08/24/2023

## 2023-08-04 ENCOUNTER — TELEPHONE (OUTPATIENT)
Facility: CLINIC | Age: 48
End: 2023-08-04
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
## 2023-08-04 DIAGNOSIS — I89.0 LYMPHEDEMA OF LEFT LEG: ICD-10-CM

## 2023-08-04 NOTE — TELEPHONE ENCOUNTER
[facility] will not schedule patient until you put in a referral request.    All I see is the referral on 5/31 put in by Dr. Vance Jenkins to the [facility] location, which had told her they could not see her for 6 mos.

## 2023-08-10 ENCOUNTER — TELEPHONE (OUTPATIENT)
Facility: CLINIC | Age: 48
End: 2023-08-10

## 2023-08-10 NOTE — TELEPHONE ENCOUNTER
Patient would like to know the status of the order at her last visit for a wedge pillow. She has not received it. She also has not heard from Physical Therapy.

## 2023-08-11 ENCOUNTER — OFFICE VISIT (OUTPATIENT)
Facility: CLINIC | Age: 48
End: 2023-08-11
Payer: MEDICAID

## 2023-08-11 VITALS
OXYGEN SATURATION: 97 % | HEIGHT: 65 IN | SYSTOLIC BLOOD PRESSURE: 131 MMHG | HEART RATE: 94 BPM | BODY MASS INDEX: 26.99 KG/M2 | TEMPERATURE: 98.3 F | RESPIRATION RATE: 16 BRPM | DIASTOLIC BLOOD PRESSURE: 77 MMHG | WEIGHT: 162 LBS

## 2023-08-11 DIAGNOSIS — Z02.89 ENCOUNTER FOR COMPLETION OF FORM WITH PATIENT: ICD-10-CM

## 2023-08-11 DIAGNOSIS — Z86.73 HISTORY OF TIA (TRANSIENT ISCHEMIC ATTACK): ICD-10-CM

## 2023-08-11 DIAGNOSIS — E78.5 HYPERLIPIDEMIA LDL GOAL <70: ICD-10-CM

## 2023-08-11 DIAGNOSIS — I89.0 LYMPHEDEMA OF LEFT LEG: ICD-10-CM
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
## 2023-08-11 PROCEDURE — 99215 OFFICE O/P EST HI 40 MIN: CPT | Performed by: NURSE PRACTITIONER

## 2023-08-11 PROCEDURE — 3075F SYST BP GE 130 - 139MM HG: CPT | Performed by: NURSE PRACTITIONER

## 2023-08-11 PROCEDURE — 3078F DIAST BP <80 MM HG: CPT | Performed by: NURSE PRACTITIONER

## 2023-08-11 SDOH — ECONOMIC STABILITY: FOOD INSECURITY: WITHIN THE PAST 12 MONTHS, THE FOOD YOU BOUGHT JUST DIDN'T LAST AND YOU DIDN'T HAVE MONEY TO GET MORE.: NEVER TRUE

## 2023-08-11 SDOH — ECONOMIC STABILITY: FOOD INSECURITY: WITHIN THE PAST 12 MONTHS, YOU WORRIED THAT YOUR FOOD WOULD RUN OUT BEFORE YOU GOT MONEY TO BUY MORE.: NEVER TRUE

## 2023-08-11 SDOH — ECONOMIC STABILITY: INCOME INSECURITY: HOW HARD IS IT FOR YOU TO PAY FOR THE VERY BASICS LIKE FOOD, HOUSING, MEDICAL CARE, AND HEATING?: NOT HARD AT ALL

## 2023-08-11 ASSESSMENT — PATIENT HEALTH QUESTIONNAIRE - PHQ9
SUM OF ALL RESPONSES TO PHQ9 QUESTIONS 1 & 2: 0
1. LITTLE INTEREST OR PLEASURE IN DOING THINGS: 0
SUM OF ALL RESPONSES TO PHQ QUESTIONS 1-9: 0
2. FEELING DOWN, DEPRESSED OR HOPELESS: 0
SUM OF ALL RESPONSES TO PHQ QUESTIONS 1-9: 0

## 2023-08-11 ASSESSMENT — ENCOUNTER SYMPTOMS: SHORTNESS OF BREATH: 0

## 2023-08-14 RX ORDER — IBUPROFEN 800 MG/1
800 TABLET ORAL EVERY 8 HOURS PRN
Qty: 180 TABLET | Refills: 0 | Status: SHIPPED | OUTPATIENT
Start: 2023-08-14

## 2023-08-14 RX ORDER — ATORVASTATIN CALCIUM 80 MG/1
TABLET, FILM COATED ORAL
Qty: 90 TABLET | Refills: 0 | OUTPATIENT
Start: 2023-08-14

## 2023-08-14 NOTE — TELEPHONE ENCOUNTER
Hold atorvastatin, liver enzymes worsening. Please ask if she has followed up with GI. We will discuss atorvastatin at her next scheduled appointment. Hold atorvastatin until then. Thank you.

## 2023-08-14 NOTE — TELEPHONE ENCOUNTER
Spoke w/ pt and informed her to hold Atorvastatin and she stated her next f/u w/ GI is in October. Pt informed she will discuss Atorvastatin at  her next f/u. Pt verbalized understanding.

## 2023-08-17 ENCOUNTER — TELEPHONE (OUTPATIENT)
Facility: CLINIC | Age: 48
End: 2023-08-17

## 2023-08-17 NOTE — TELEPHONE ENCOUNTER
Patient called inquiring about the wedge pillow. Order been faxed to [facility], Patient was given contact information to call DME facility to check the status.

## 2023-08-17 NOTE — TELEPHONE ENCOUNTER
Pt states she called DME facility and was told they have not received any orders for her. Please advise.

## 2023-08-31 ENCOUNTER — PROCEDURE VISIT (OUTPATIENT)
Age: 48
End: 2023-08-31

## 2023-08-31 VITALS
RESPIRATION RATE: 16 BRPM | HEART RATE: 87 BPM | DIASTOLIC BLOOD PRESSURE: 78 MMHG | SYSTOLIC BLOOD PRESSURE: 121 MMHG | TEMPERATURE: 97.8 F | WEIGHT: 160.4 LBS | BODY MASS INDEX: 26.73 KG/M2 | HEIGHT: 65 IN

## 2023-08-31 DIAGNOSIS — R94.131 ABNORMAL EMG: ICD-10-CM

## 2023-08-31 DIAGNOSIS — G56.03 BILATERAL CARPAL TUNNEL SYNDROME: ICD-10-CM

## 2023-08-31 DIAGNOSIS — R20.2 NUMBNESS AND TINGLING IN BOTH HANDS: Primary | ICD-10-CM

## 2023-08-31 DIAGNOSIS — R20.0 NUMBNESS AND TINGLING IN BOTH HANDS: Primary | ICD-10-CM

## 2023-09-05 ENCOUNTER — OFFICE VISIT (OUTPATIENT)
Age: 48
End: 2023-09-05

## 2023-09-05 VITALS — HEIGHT: 65 IN | BODY MASS INDEX: 26.69 KG/M2

## 2023-09-05 DIAGNOSIS — M62.561 ATROPHY OF MUSCLE OF RIGHT LOWER LEG: ICD-10-CM

## 2023-09-05 DIAGNOSIS — M67.01 CONTRACTURE OF RIGHT ACHILLES TENDON: Primary | ICD-10-CM

## 2023-09-05 DIAGNOSIS — R26.89 LIMP: ICD-10-CM

## 2023-09-05 DIAGNOSIS — Z78.9 HISTORY OF DOG BITE: ICD-10-CM

## 2023-09-05 RX ORDER — ACETAMINOPHEN AND CODEINE PHOSPHATE 300; 30 MG/1; MG/1
1 TABLET ORAL EVERY 8 HOURS PRN
Qty: 21 TABLET | Refills: 0 | Status: SHIPPED | OUTPATIENT
Start: 2023-09-05 | End: 2023-09-12

## 2023-09-05 RX ORDER — MELOXICAM 15 MG/1
15 TABLET ORAL DAILY
Qty: 30 TABLET | Refills: 3 | Status: SHIPPED | OUTPATIENT
Start: 2023-09-05

## 2023-09-05 SDOH — HEALTH STABILITY: PHYSICAL HEALTH: ON AVERAGE, HOW MANY MINUTES DO YOU ENGAGE IN EXERCISE AT THIS LEVEL?: 0 MIN

## 2023-09-05 SDOH — HEALTH STABILITY: PHYSICAL HEALTH: ON AVERAGE, HOW MANY DAYS PER WEEK DO YOU ENGAGE IN MODERATE TO STRENUOUS EXERCISE (LIKE A BRISK WALK)?: 0 DAYS

## 2023-09-05 ASSESSMENT — ENCOUNTER SYMPTOMS: SHORTNESS OF BREATH: 0

## 2023-09-05 ASSESSMENT — SOCIAL DETERMINANTS OF HEALTH (SDOH)
WITHIN THE LAST YEAR, HAVE YOU BEEN KICKED, HIT, SLAPPED, OR OTHERWISE PHYSICALLY HURT BY YOUR PARTNER OR EX-PARTNER?: NO
WITHIN THE LAST YEAR, HAVE YOU BEEN AFRAID OF YOUR PARTNER OR EX-PARTNER?: NO
WITHIN THE LAST YEAR, HAVE TO BEEN RAPED OR FORCED TO HAVE ANY KIND OF SEXUAL ACTIVITY BY YOUR PARTNER OR EX-PARTNER?: NO
WITHIN THE LAST YEAR, HAVE YOU BEEN HUMILIATED OR EMOTIONALLY ABUSED IN OTHER WAYS BY YOUR PARTNER OR EX-PARTNER?: NO

## 2023-09-06 ENCOUNTER — OFFICE VISIT (OUTPATIENT)
Age: 48
End: 2023-09-06
Payer: MEDICAID

## 2023-09-06 VITALS — BODY MASS INDEX: 26.66 KG/M2 | HEIGHT: 65 IN | WEIGHT: 160 LBS

## 2023-09-06 DIAGNOSIS — G56.03 BILATERAL CARPAL TUNNEL SYNDROME: Primary | ICD-10-CM

## 2023-09-06 PROCEDURE — 99214 OFFICE O/P EST MOD 30 MIN: CPT | Performed by: ORTHOPAEDIC SURGERY

## 2023-09-07 ENCOUNTER — OFFICE VISIT (OUTPATIENT)
Facility: CLINIC | Age: 48
End: 2023-09-07
Payer: MEDICAID

## 2023-09-07 VITALS
WEIGHT: 163 LBS | SYSTOLIC BLOOD PRESSURE: 130 MMHG | BODY MASS INDEX: 27.16 KG/M2 | HEIGHT: 65 IN | HEART RATE: 73 BPM | RESPIRATION RATE: 16 BRPM | DIASTOLIC BLOOD PRESSURE: 80 MMHG | TEMPERATURE: 98.1 F | OXYGEN SATURATION: 100 %

## 2023-09-07 DIAGNOSIS — R79.89 ELEVATED LFTS: ICD-10-CM
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
## 2023-09-07 DIAGNOSIS — I89.0 LYMPHEDEMA OF LEFT LEG: ICD-10-CM

## 2023-09-07 PROCEDURE — 3079F DIAST BP 80-89 MM HG: CPT | Performed by: NURSE PRACTITIONER

## 2023-09-07 PROCEDURE — 99214 OFFICE O/P EST MOD 30 MIN: CPT | Performed by: NURSE PRACTITIONER

## 2023-09-07 PROCEDURE — 3075F SYST BP GE 130 - 139MM HG: CPT | Performed by: NURSE PRACTITIONER

## 2023-09-07 NOTE — ASSESSMENT & PLAN NOTE
Failed to follow up with GI as referred in May  Advised on ETOH/Tylenol use  Recheck LFTs today   Hold atorvastatin 80 mg for now until labs done  Follow up as scheduled with GI in October

## 2023-09-13 ENCOUNTER — HOSPITAL ENCOUNTER (OUTPATIENT)
Facility: HOSPITAL | Age: 48
Discharge: HOME | End: 2023-09-16
Payer: MEDICAID

## 2023-09-13 DIAGNOSIS — R20.2 NUMBNESS AND TINGLING OF RIGHT ARM: ICD-10-CM

## 2023-09-13 DIAGNOSIS — R20.0 NUMBNESS AND TINGLING OF RIGHT ARM: ICD-10-CM

## 2023-09-13 PROCEDURE — 95816 EEG AWAKE AND DROWSY: CPT

## 2023-09-15 ENCOUNTER — OFFICE VISIT (OUTPATIENT)
Age: 48
End: 2023-09-15

## 2023-09-15 VITALS
BODY MASS INDEX: 27.99 KG/M2 | SYSTOLIC BLOOD PRESSURE: 120 MMHG | HEIGHT: 65 IN | WEIGHT: 168 LBS | RESPIRATION RATE: 18 BRPM | HEART RATE: 82 BPM | OXYGEN SATURATION: 97 % | DIASTOLIC BLOOD PRESSURE: 80 MMHG

## 2023-09-15 DIAGNOSIS — Z92.89 HISTORY OF SENSORY CHANGES: Primary | ICD-10-CM

## 2023-09-15 DIAGNOSIS — R20.0 LEFT ARM NUMBNESS: ICD-10-CM

## 2023-09-15 DIAGNOSIS — R20.2 TINGLING OF LEFT UPPER EXTREMITY: ICD-10-CM

## 2023-09-15 RX ORDER — LORAZEPAM 0.5 MG/1
0.5 TABLET ORAL
Qty: 1 TABLET | Refills: 0 | Status: SHIPPED | OUTPATIENT
Start: 2023-09-15 | End: 2023-09-15

## 2023-09-15 NOTE — PATIENT INSTRUCTIONS
Patient instructions:  -MRI brain and cervical spine- scheduling will call you  -EEG report pending  -

## 2023-09-15 NOTE — PROGRESS NOTES
[address]  [address] [facility]. [address] [place] [address]  Office:  [phone]  Fax: [phone]  Chief Complaint   Patient presents with    Follow-up       HPI: Roro Ann' presents in follow-up. She was seen in the hospital in October 2021 for a transient episode of  left facial droop, dragging sensation, and left upper extremity weakness which lasted for about 10 minutes. She had similar symptoms after about 7 to 8 hours; again resolved within 10 minutes. MRI of the brain did not show any acute stroke. MRA of the brain was unremarkable. Carotid ultrasound was normal.  Possible TIA. She was placed on DAPT x 21 days then single antiplatelet therapy with aspirin 81 mg daily. Also to continue statin. She had a post hospital follow-up visit in November 2021 and the left facial symptoms and right upper extremity symptoms were noted at that time. She was last seen here on 1/11/2023. She reported that she still experiences the intermittent left facial spasms described as tightening which occurs with or without right arm paresthesias and fatigue after. Fatigue lasts about 10 minutes where she feels like her energy is gone and has to rest.  Symptoms include left face numbness and right arm tingling. Can occur independent of eachother. Plan was to obtain obtain MRI brain with and without contrast with dedicated cranial nerve imaging. Obtain EEG.  EMG/NCS of the upper extremities for right upper extremity tingling episodes. Will obtain labs for vitamin B12 level, TSH, acetylcholine antibodies. Considerations include hemifacial spasms, epileptic, tingling related to CTS, possible psychogenic component, other. Advised to continue aspirin, statin, vascular risk factor management including BP and lipid control. Encouraged to avoid excess alcohol. TSH and vitamin B12, folate normal.  MRI brain not done. EEG done but result pending.     EMG/NCS of the BUE done on

## 2023-09-20 ENCOUNTER — TELEPHONE (OUTPATIENT)
Age: 48
End: 2023-09-20

## 2023-09-21 ENCOUNTER — TELEPHONE (OUTPATIENT)
Age: 48
End: 2023-09-21

## 2023-09-21 NOTE — TELEPHONE ENCOUNTER
Patient called requesting EEG results. I notified patient that the results have not been uploaded as of yet. Please advise.

## 2023-10-10 ENCOUNTER — HOSPITAL ENCOUNTER (OUTPATIENT)
Facility: HOSPITAL | Age: 48
Discharge: HOME | End: 2023-10-13

## 2023-10-10 ENCOUNTER — TELEPHONE (OUTPATIENT)
Age: 48
End: 2023-10-10

## 2023-10-10 DIAGNOSIS — R20.0 LEFT ARM NUMBNESS: ICD-10-CM

## 2023-10-10 DIAGNOSIS — R20.2 TINGLING OF LEFT UPPER EXTREMITY: ICD-10-CM

## 2023-10-10 NOTE — TELEPHONE ENCOUNTER
Patient called and stated that she went to take her MRI but she was claustrophobic and wants to know if she can be sedated for her MRI. Please advise.

## 2023-10-23 ENCOUNTER — TELEPHONE (OUTPATIENT)
Age: 48
End: 2023-10-23

## 2023-11-13 ENCOUNTER — OFFICE VISIT (OUTPATIENT)
Age: 48
End: 2023-11-13
Payer: MEDICAID

## 2023-11-13 VITALS — HEIGHT: 65 IN | BODY MASS INDEX: 27.96 KG/M2

## 2023-11-13 DIAGNOSIS — I10 PRIMARY HYPERTENSION: ICD-10-CM

## 2023-11-13 DIAGNOSIS — R79.89 ELEVATED LFTS: ICD-10-CM

## 2023-11-13 DIAGNOSIS — N95.1 POSTMENOPAUSAL SYNDROME: ICD-10-CM

## 2023-11-13 DIAGNOSIS — M79.642 BILATERAL HAND PAIN: ICD-10-CM

## 2023-11-13 DIAGNOSIS — M67.01 CONTRACTURE OF RIGHT ACHILLES TENDON: Primary | ICD-10-CM

## 2023-11-13 DIAGNOSIS — E78.5 HYPERLIPIDEMIA LDL GOAL <70: ICD-10-CM

## 2023-11-13 DIAGNOSIS — M79.641 BILATERAL HAND PAIN: ICD-10-CM

## 2023-11-13 PROCEDURE — 99214 OFFICE O/P EST MOD 30 MIN: CPT | Performed by: ORTHOPAEDIC SURGERY

## 2023-11-13 NOTE — PROGRESS NOTES
visit.        DIAGNOSTIC LAB DATA      XR TIBIA FIBULA RIGHT (2 VIEWS) 06/07/2023    Narrative  EXAM:  XR TIBIA FIBULA RIGHT (2 VIEWS), XR ANKLE RIGHT (2 VIEWS)    INDICATION:   leg pain    COMPARISON: 5/19/2023. FINDINGS:  2 views of the right tibia and fibula and 2 views of the right ankle    Osseous structures: Intact and normally aligned. No periostitis or osseous  lesion. Soft tissue: Improving soft tissue wound/ulceration with soft tissue swelling in  the calf. No deep soft tissue gas or radiopaque foreign body. Improving soft  tissue swelling and ulceration in the medial lower leg. No deep soft tissue gas. Impression  Improving soft tissue wound/ulceration and soft tissue swelling without osseous  abnormality. No results found for this or any previous visit (from the past 4464 hour(s)).       Lab Results   Component Value Date    WBC 8.4 06/07/2023    HGB 12.4 06/07/2023    HCT 36.7 06/07/2023    MCV 87.6 06/07/2023     06/07/2023    LYMPHOPCT 32 06/07/2023    RBC 4.19 (L) 06/07/2023    MCH 29.6 06/07/2023    MCHC 33.8 06/07/2023    RDW 13.7 06/07/2023     Hemoglobin A1C   Date Value Ref Range Status   10/29/2021 5.6 4.2 - 5.6 % Final     Comment:     (NOTE)  HbA1C Interpretive Ranges  <5.7              Normal  5.7 - 6.4         Consider Prediabetes  >6.5              Consider Diabetes     ,  Lab Results   Component Value Date     06/07/2023    K 3.4 (L) 06/07/2023     06/07/2023    CO2 30 06/07/2023    BUN 9 06/07/2023    CREATININE 0.64 06/07/2023    GLUCOSE 106 (H) 06/07/2023    CALCIUM 9.4 06/07/2023    PROT 8.4 (H) 06/07/2023    LABALBU 3.8 06/07/2023    BILITOT 0.5 06/07/2023    ALKPHOS 92 06/07/2023    AST 91 (H) 06/07/2023     (H) 06/07/2023    LABGLOM >60 06/07/2023    GFRAA >60 09/27/2022    AGRATIO 1.4 04/26/2023    GLOB 4.6 (H) 06/07/2023     No results found for: \"VITD25\" ,  Lab Results   Component Value Date    INR 1.0 10/29/2021    PROTIME 13.5 10/29/2021

## 2023-11-13 NOTE — PATIENT INSTRUCTIONS
[facility] Total [employer] support  You can purchase these from [employer] or [place] [facility]

## 2023-11-15 ENCOUNTER — TELEPHONE (OUTPATIENT)
Age: 48
End: 2023-11-15

## 2023-11-15 NOTE — TELEPHONE ENCOUNTER
Patient states she was told her [employer] paperwork would be ready 11/14.    She was seen in the office on 11/13 and dropped of the paperwork as well.    Please advise.    Patient can be reached at [phone].

## 2023-11-17 DIAGNOSIS — I10 PRIMARY HYPERTENSION: ICD-10-CM

## 2023-11-17 DIAGNOSIS — N95.1 POSTMENOPAUSAL SYNDROME: ICD-10-CM

## 2023-11-24 ENCOUNTER — HOSPITAL ENCOUNTER (OUTPATIENT)
Facility: HOSPITAL | Age: 48
End: 2023-11-24
Payer: MEDICAID

## 2023-11-24 PROCEDURE — A9577 INJ MULTIHANCE: HCPCS | Performed by: STUDENT IN AN ORGANIZED HEALTH CARE EDUCATION/TRAINING PROGRAM

## 2023-11-24 PROCEDURE — 72156 MRI NECK SPINE W/O & W/DYE: CPT

## 2023-11-24 PROCEDURE — 6360000004 HC RX CONTRAST MEDICATION: Performed by: STUDENT IN AN ORGANIZED HEALTH CARE EDUCATION/TRAINING PROGRAM

## 2023-11-24 PROCEDURE — 70553 MRI BRAIN STEM W/O & W/DYE: CPT

## 2023-11-24 RX ADMIN — GADOBENATE DIMEGLUMINE 13 ML: 529 INJECTION, SOLUTION INTRAVENOUS at 16:21

## 2023-12-05 ENCOUNTER — HOSPITAL ENCOUNTER (OUTPATIENT)
Facility: HOSPITAL | Age: 48
Setting detail: RECURRING SERIES
Discharge: HOME | End: 2023-12-08
Payer: MEDICAID

## 2023-12-05 PROCEDURE — 97161 PT EVAL LOW COMPLEX 20 MIN: CPT

## 2023-12-05 PROCEDURE — 97162 PT EVAL MOD COMPLEX 30 MIN: CPT

## 2023-12-05 NOTE — THERAPY EVALUATION
[address] [facility] PHYSICAL THERAPY  [address] [place] [address] CD:[phone] Fx: [phone]  Plan of Care / Statement of Necessity for Physical Therapy Services     Patient Name: Rupali Hawley : 1975   Medical   Diagnosis: Right ankle pain [M25.571] Treatment Diagnosis: M25.571  RIGHT ANKLE PAIN  and R26.89  Abnormalities of gait and mobility      Onset Date: 23 Payor :  Payor: Kingsley Vasques / Plan: [employer] / Product Type: *No Product type* /    Referral Source: Lawrence Martin MD [facility]): 2023   Prior Hospitalization: See medical history Provider #: [id number]   Prior Level of Function: lives with son in 2 story home; working as cook at [employer]   Comorbidities: Depression, HTN, PVD, history of CVA and multiple TIAs, lymphedema     Assessment / key information: Patient is a 44-year-old right-handed female who presents to therapy with chief complaint of right ankle pain following dog bite in May 2023. Patient reports significant pain without wearing her boot and sleeve and pain around bite areas. She reports numbness to medial foot and edema to foot. She was diagnosed with lymphedema due to the swelling in her entire leg. Symptoms aggravated with pressure from water, walking, and not wearing boot/sleeve; symptoms reduced with PM and Tylenol. Patient presents to therapy using knee scooter for mobility with CAM boot and copper sleeve donned. Exam reveals patient with decreased left ankle AROM and strength along with decreased B hip strength. Min gastroc and hamstring tightness noted along with min decreased great toe flexibility. Pain noted to light touch along medial foot from malleolus to 1st metatarsal head. Tandem balance limited to ~ 3 sec B secondary to pain.  Patient would benefit from skilled outpatient PT to address above mentioned deficits to return to prior level of function,

## 2023-12-05 NOTE — PROGRESS NOTES
(if diff from Tx Min) Procedure, Rationale, Specifics   7  [address] Self Care/Home Management (timed):  improve patient knowledge and understanding of pain reducing techniques, positioning, posture/ergonomics, home safety, activity modification, diagnosis/prognosis, and physical therapy expectations, procedures and progression  to improve patient's ability to progress to PLOF and address remaining functional goals.   (see flow sheet as applicable)     Details if applicable:  wearing shoe with thicker sole for improved balance; bringing shoe to therapy to transition to WB in shoe;   diagnosis, prognosis, POC, purpose and importance of therapy; anatomy and physiology as it relates to current condition; examination findings     7  [facility] Totals Reminder: bill using total billable min of TIMED therapeutic procedures (example: do not include dry needle or estim unattended, both untimed codes, in totals to left)  8-22 min = 1 unit; 23-37 min = 2 units; 38-52 min = 3 units; 53-67 min = 4 units; 68-82 min = 5 units   Total Total     [x]  Patient Education billed concurrently with other procedures   [x] Review HEP    [] Progressed/Changed HEP, detail:    [] Other detail:       Objective Information/Functional Measures/Assessment    SUBJECTIVE  Physical Therapy Evaluation  - Foot and Ankle        ROM/Strength  [] Unable to assess at this time    Right LE MMT: hip flex 4+ ER 4 IR 4+ abd 4 add 4 ext; knee flex 4+ ext 5; ankle DF 3 PF 4- inv 4 ev 4  Left LE MMT: hip flex ER IR abd 4 add 4 ext; knee flex ext; ankle DF 5 PF 5 inv 5 ev 4+      AROM           Left Right   Dorsiflexion 4 To neutral   Plantarflexion 20 10   Inversion 12 4   Eversion 22 9   Great Toe Ext     Great Toe Flex       Flexibility: [] Unable to assess at this time  Gastroc:    (L) Tightness [] WNL   [] Min   [] Mod   [] Severe    (R) Tightness [] WNL   X Min   [] Mod   [] Severe  Soleus:    (L) Tightness [] WNL   [] Min   [] Mod   [] Severe    (R) Tightness [] WNL

## 2023-12-06 ENCOUNTER — HOSPITAL ENCOUNTER (EMERGENCY)
Facility: HOSPITAL | Age: 48
Discharge: HOME | End: 2023-12-06
Payer: MEDICAID

## 2023-12-06 ENCOUNTER — APPOINTMENT (OUTPATIENT)
Facility: HOSPITAL | Age: 48
End: 2023-12-06
Payer: MEDICAID

## 2023-12-06 VITALS
OXYGEN SATURATION: 98 % | WEIGHT: 165 LBS | HEART RATE: 87 BPM | DIASTOLIC BLOOD PRESSURE: 83 MMHG | RESPIRATION RATE: 18 BRPM | SYSTOLIC BLOOD PRESSURE: 141 MMHG | HEIGHT: 65 IN | TEMPERATURE: 97.9 F | BODY MASS INDEX: 27.49 KG/M2

## 2023-12-06 DIAGNOSIS — G89.29 CHRONIC PAIN OF RIGHT ANKLE: ICD-10-CM

## 2023-12-06 DIAGNOSIS — I89.0 LYMPHEDEMA OF LEFT LEG: ICD-10-CM

## 2023-12-06 DIAGNOSIS — S93.402A SPRAIN OF LEFT ANKLE, UNSPECIFIED LIGAMENT, INITIAL ENCOUNTER: Primary | ICD-10-CM

## 2023-12-06 DIAGNOSIS — M25.572 ACUTE LEFT ANKLE PAIN: ICD-10-CM

## 2023-12-06 DIAGNOSIS — M25.571 CHRONIC PAIN OF RIGHT ANKLE: ICD-10-CM
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
## 2023-12-06 PROCEDURE — 73600 X-RAY EXAM OF ANKLE: CPT

## 2023-12-06 PROCEDURE — 99283 EMERGENCY DEPT VISIT LOW MDM: CPT

## 2023-12-06 RX ORDER — IBUPROFEN 800 MG/1
800 TABLET ORAL EVERY 8 HOURS PRN
Qty: 30 TABLET | Refills: 0 | Status: SHIPPED | OUTPATIENT
Start: 2023-12-06

## 2023-12-06 ASSESSMENT — PAIN - FUNCTIONAL ASSESSMENT: PAIN_FUNCTIONAL_ASSESSMENT: 0-10

## 2023-12-06 ASSESSMENT — PAIN DESCRIPTION - LOCATION: LOCATION: ANKLE

## 2023-12-06 ASSESSMENT — PAIN DESCRIPTION - FREQUENCY: FREQUENCY: CONTINUOUS

## 2023-12-06 ASSESSMENT — PAIN SCALES - GENERAL: PAINLEVEL_OUTOF10: 10

## 2023-12-06 ASSESSMENT — PAIN DESCRIPTION - DESCRIPTORS: DESCRIPTORS: THROBBING;ACHING

## 2023-12-06 ASSESSMENT — PAIN DESCRIPTION - DIRECTION: RADIATING_TOWARDS: SHIN

## 2023-12-06 ASSESSMENT — PAIN DESCRIPTION - ORIENTATION: ORIENTATION: RIGHT;LEFT

## 2023-12-06 NOTE — ED NOTES
Patient D/C in stable condition wih all belongings. Patient leaves ambulatory with rollator. No questions nor concern, NAD. Patient provdes understanding of D/C instructions by Verbalized understanding and Repeated back.         Katie Davis, [address] [place] [address]  12/06/23 0517

## 2023-12-06 NOTE — ED PROVIDER NOTES
[address] [facility]  [phone]    In 2 days            Medication List        CONTINUE taking these medications      ibuprofen 800 MG tablet  Commonly known as: ADVIL;MOTRIN  Take 1 tablet by mouth every 8 hours as needed for Pain            ASK your doctor about these medications      amLODIPine 10 MG tablet  Commonly known as: NORVASC  TAKE ONE TABLET BY MOUTH DAILY     aspirin 81 MG EC tablet     hydroCHLOROthiazide 12.5 MG capsule  Commonly known as: MICROZIDE  TAKE 1 CAPSULE BY MOUTH DAILY FOR HIGH BLOOD PRESSURE     meloxicam 15 MG tablet  Commonly known as: Mobic  Take 1 tablet by mouth daily     venlafaxine 37.5 MG extended release capsule  Commonly known as: EFFEXOR XR  Take 1 capsule by mouth daily               Where to Get Your Medications        These medications were sent to [facility] [id number] [facility], [address] [place] [address], [address] [place] [address],Third Floor      Phone: [phone]   ibuprofen 800 MG tablet          Dictation disclaimer:  Please note that this dictation was completed with [employer], the computer voice recognition software. Quite often unanticipated grammatical, syntax, homophones, and other interpretive errors are inadvertently transcribed by the computer software. Please disregard these errors. Please excuse any errors that have escaped final proofreading.          CARMEN Renteria NP  12/06/23 4713

## 2023-12-06 NOTE — ED TRIAGE NOTES
Pt presents to ED using a knee scooter, pt c/o bilateral ankle pain. Pt states that she recently was attached by a putbull in my causong injury to her right ankle, has a air boot in place. Pt states yesterday she slipped off steps cvausing her to place all her weight on left ankle and now is having sharp, throbbin g pain in left ankle.

## 2023-12-07 DIAGNOSIS — I10 PRIMARY HYPERTENSION: ICD-10-CM

## 2023-12-08 RX ORDER — AMLODIPINE BESYLATE 10 MG/1
TABLET ORAL
Qty: 30 TABLET | Refills: 0 | OUTPATIENT
Start: 2023-12-08

## 2023-12-08 NOTE — TELEPHONE ENCOUNTER
Last Filled - 5/9/23    Last Appt -9/7/23    Next Appt -none     Labs - 4/26/23      Additional Notes:

## 2023-12-11 ENCOUNTER — TELEPHONE (OUTPATIENT)
Age: 48
End: 2023-12-11

## 2023-12-11 NOTE — TELEPHONE ENCOUNTER
Patient called for .    Patient said that she was seen at [facility] on 12/6/23 for a Right Foot Fx , and a Left Foot Sprain.    Patient is asking to see  as soon as possible.    Patient cell # [phone]  H # [phone].    Note : patient last seen on 11/13/23 for the Right Achilles tendon by

## 2023-12-12 NOTE — TELEPHONE ENCOUNTER
Patient didn't receive message until today and that spot for tomorrow has been taken, please advise

## 2023-12-15 ENCOUNTER — OFFICE VISIT (OUTPATIENT)
Age: 48
End: 2023-12-15
Payer: MEDICAID

## 2023-12-15 VITALS
BODY MASS INDEX: 29.16 KG/M2 | HEIGHT: 65 IN | RESPIRATION RATE: 18 BRPM | HEART RATE: 85 BPM | DIASTOLIC BLOOD PRESSURE: 80 MMHG | WEIGHT: 175 LBS | SYSTOLIC BLOOD PRESSURE: 130 MMHG | OXYGEN SATURATION: 98 %

## 2023-12-15 DIAGNOSIS — I10 PRIMARY HYPERTENSION: ICD-10-CM

## 2023-12-15 DIAGNOSIS — R20.2 TINGLING OF LEFT UPPER EXTREMITY: ICD-10-CM

## 2023-12-15 DIAGNOSIS — R20.0 LEFT ARM NUMBNESS: Primary | ICD-10-CM

## 2023-12-15 DIAGNOSIS — Z92.89 HISTORY OF SENSORY CHANGES: ICD-10-CM

## 2023-12-15 DIAGNOSIS — M50.30 DDD (DEGENERATIVE DISC DISEASE), CERVICAL: ICD-10-CM

## 2023-12-15 PROCEDURE — 99213 OFFICE O/P EST LOW 20 MIN: CPT | Performed by: NURSE PRACTITIONER

## 2023-12-15 PROCEDURE — 3079F DIAST BP 80-89 MM HG: CPT | Performed by: NURSE PRACTITIONER

## 2023-12-15 PROCEDURE — 3075F SYST BP GE 130 - 139MM HG: CPT | Performed by: NURSE PRACTITIONER

## 2023-12-15 RX ORDER — AMLODIPINE BESYLATE 10 MG/1
TABLET ORAL
Qty: 30 TABLET | Refills: 0 | OUTPATIENT
Start: 2023-12-15

## 2023-12-15 NOTE — PATIENT INSTRUCTIONS
Patient instructions:  -PT for neck pain  [facility].   [place], [address] [facility]  [phone]    -ortho referral:  [facility] - Rebeca Gilbert MD  [address] [place] [address], [address] [place], [place] [address], [address] [facility]   [phone]    -wear the brace    -can try duloxetine

## 2023-12-15 NOTE — TELEPHONE ENCOUNTER
Last Filled - 5/9/23    Last Appt -9/7/23    Next Appt -none     Labs - 4/26/23      Additional Notes:     [facility] -    [facility] -

## 2023-12-15 NOTE — PROGRESS NOTES
[address] [place] [address] Drive  [facility]. [address] [place] [address]  Office:  [phone]  Fax: [phone]  Chief Complaint   Patient presents with    Transient Ischemic Attack    Follow-up       HPI: Carleen Castro presents in follow-up. She was last seen here on 9/15/2023. History as below. EEG results from EEG done on 9/13/2023 were pending. MRI brain not done yet. Plan was for MRI of the brain and MRI of the cervical spine due to left upper extremity sensory changes. Rule out demyelinating disease. Labs showed low potassium. She had elevated hepatic enzymes. Encouraged to wear the wrist braces for CTS. MRI of the brain 11/27/2023  \"IMPRESSION:     No clearly acute intracranial findings. Motion slightly limits evaluation. Minimal amount of periventricular and patchy subcortical signal abnormality,  nonspecific, not clearly pathologic. \"      MRI cervical spine 11/27/2023:  \"IMPRESSION:     Degenerative disc disease and uncovertebral spurring at multiple levels, most  notable at C5-T1. Multifocal mild facet arthropathy. -Multifocal mild spinal canal stenoses. -Multifocal moderate foraminal stenoses. See level by level details above. No definite cord lesions identified within limits of exam.\"    EEG was normal.      She presents today in follow-up. She is with her significant other. We discussed results. No significant pain but feels some popping of the neck if turning too fast.  That happens about twice a week, depends on how long she's laying down. Taking aspirin. Ibuprofen is prn. No spasms. Just the tingling on left side. Depends on the type of movement. The whole left arm gets affected. Tried the wrist braces, wears it sometimes at night, doesn't help much. Will try to wear it more often. Wearing the boot on right foot from injury from dog bite. PCP prescribed duloxetine 30 mg for nerve pain but patient didn't try it yet.         From previous

## 2023-12-21 ENCOUNTER — TELEPHONE (OUTPATIENT)
Age: 48
End: 2023-12-21

## 2023-12-21 NOTE — TELEPHONE ENCOUNTER
Patient called in and would like to know where was her referral for pain management sent to.        Please call and advise patient at  [phone]

## 2023-12-27 ENCOUNTER — HOSPITAL ENCOUNTER (OUTPATIENT)
Facility: HOSPITAL | Age: 48
Setting detail: RECURRING SERIES
Discharge: HOME | End: 2023-12-30
Payer: MEDICAID

## 2023-12-27 PROCEDURE — 97530 THERAPEUTIC ACTIVITIES: CPT

## 2023-12-27 PROCEDURE — 97110 THERAPEUTIC EXERCISES: CPT

## 2023-12-27 NOTE — PROGRESS NOTES
address functional deficits and attain remaining goals. Progress toward goals / Updated goals:  [x]  See Progress Note/Recertification    Short Term Goals: To be accomplished in 3 treatments  Patient will report compliance with initial HEP to optimize therapy outcomes. al:  Status at evaluation: established   goal met. 12/27/23    Long Term Goals: To be accomplished in 8 weeks  Patient will improve FOTO score to at least 52/100 points in order to demonstrate functional improvement. Status at evaluation: 31/100  2. Patient will report no greater than 5/10 pain to right ankle/LE in order to perform daily tasks with increased ease. Status at evaluation: 8/10 at time of therapy visit  3. Patient will improve right ankle AROM by at least 10 deg in each direction in order to ambulate with increased ease. Status at evaluation:                               Left Right   Dorsiflexion 4 To neutral   Plantarflexion 20 10   Inversion 12 4   Eversion 22 9      4. Patient will improve right ankle DF and PF strength to at least 4/5 with MMT in order to ambulate and perform functional tasks with increased ease. Status at evaluation: DF 3/5, PF 4-/5 (in available range)  5. Patient will be able to ambulate at least 200ft with no more than LRAD in order to navigate home and community with increased ease.              Status at evaluation: using knee scooter    Next PN/ RC due 1/3/24  Auth due 3/1/24    PLAN  - Continue Plan of Care  - Upgrade activities as tolerated    Mona Bautista PTA    12/27/2023    6:45 AM    Future Appointments   Date Time Provider [address] [place] [address]   12/27/2023 10:40 AM Mona Bautista PTA [id number]   6/19/2024  9:30 AM Demetrio Vazquez APRN - NP [facility]

## 2024-01-03 ENCOUNTER — HOSPITAL ENCOUNTER (OUTPATIENT)
Facility: HOSPITAL | Age: 49
Setting detail: RECURRING SERIES
Discharge: HOME | End: 2024-01-06
Payer: MEDICAID

## 2024-01-03 PROCEDURE — 97112 NEUROMUSCULAR REEDUCATION: CPT

## 2024-01-03 PROCEDURE — 97530 THERAPEUTIC ACTIVITIES: CPT

## 2024-01-03 PROCEDURE — 97110 THERAPEUTIC EXERCISES: CPT

## 2024-01-03 NOTE — THERAPY RECERTIFICATION
FELIPA SANTOYO [facility] - [facility]  [address] [place] [address] - Ph: [phone]   Fx: [phone]  PHYSICAL THERAPY PROGRESS NOTE  [x] Progress Note  [] Discharge Summary    Patient Name: Eva Ahmadi : 1975   Treatment/Medical Diagnosis: Right ankle pain [M25.571]  Abnormality of gait [R26.9]   Referral Source: Raf Marion MD     Date of Initial Visit: 23 Attended Visits: 3 Missed Visits: 0         SUMMARY OF TREATMENT  Patient has made little progress toward goals in therapy partly due to gap in therapy while awaiting insurance authorization. Patient continues to report having significant pain in the right foot and ankle that increased to 10/10 in the evenings. Patient has begun to walk at home without the boot and scooter, but report significant increased in pain. Patient reports that she must wear the compression sleeve at all times, or has increased pain. Strength for DF and PF increased to 4/5. Patient had minimal increase in AROM in the right ankle (see chart below). FOTO score decreased one point which indicates a slight decrease in functional ability. Patient reports compliance with HEP. Patient will continue to benefit from skilled PT / OT services to modify and progress therapeutic interventions, analyze and address functional mobility deficits, analyze and address ROM deficits, analyze and address strength deficits, analyze and cue for proper movement patterns, analyze and modify for postural abnormalities, analyze and address imbalance/dizziness, and instruct in home and community integration to address functional deficits and attain remaining goals.     CURRENT STATUS/Progress towards Goals:  Short Term Goals: To be accomplished in 3 treatments  Patient will report compliance with initial HEP to optimize therapy outcomes.al:  Status at evaluation: established   goal met. 23     Long Term Goals: To be accomplished

## 2024-01-03 NOTE — PROGRESS NOTES
PHYSICAL / OCCUPATIONAL THERAPY - DAILY TREATMENT NOTE (updated )    Patient Name: Eva Alan Galdino    Date: 1/3/2024    : 1975  Insurance: Payor: [facility] MEDICAID / Plan: MARILYN [facility] HEALTHKEEPERS PLUS / Product Type: *No Product type* /      Patient  verified Yes     Visit #   Current / Total 3 24   Time   In / Out 1240 114   Pain   In / Out 8/10 8/10   Subjective Functional Status/Changes: Pt stated that she still has a lot of pain that gets worse at night     TREATMENT AREA =  Right ankle pain [M25.571]  Abnormality of gait [R26.9]    OBJECTIVE         Therapeutic Procedures:    Tx Min Billable or 1:1 Min (if diff from Tx Min) Procedure, Rationale, Specifics   10  49756 Therapeutic Exercise (timed):  increase ROM, strength, coordination, balance, and proprioception to improve patient's ability to progress to PLOF and address remaining functional goals. (see flow sheet as applicable)     Details if applicable:       10  07682 Neuromuscular Re-Education (timed):  improve balance, coordination, kinesthetic sense, posture, core stability and proprioception to improve patient's ability to develop conscious control of individual muscles and awareness of position of extremities in order to progress to PLOF and address remaining functional goals. (see flow sheet as applicable)     Details if applicable:     14  83033 Therapeutic Activity (timed):  use of dynamic activities replicating functional movements to increase ROM, strength, coordination, balance, and proprioception in order to improve patient's ability to progress to PLOF and address remaining functional goals.  (see flow sheet as applicable)     Details if applicable:  FOTO and goal assess   34  [facility] Totals Reminder: bill using total billable min of TIMED therapeutic procedures (example: do not include dry needle or estim unattended, both untimed codes, in totals to left)  8-22 min = 1 unit; 23-37 min = 2 units; 38-52 min = 3 units; 53-67

## 2024-01-08 ENCOUNTER — HOSPITAL ENCOUNTER (OUTPATIENT)
Facility: HOSPITAL | Age: 49
Setting detail: RECURRING SERIES
End: 2024-01-08
Payer: MEDICAID

## 2024-01-12 ENCOUNTER — HOSPITAL ENCOUNTER (OUTPATIENT)
Facility: HOSPITAL | Age: 49
Setting detail: RECURRING SERIES
Discharge: HOME | End: 2024-01-15
Payer: MEDICAID

## 2024-01-12 PROCEDURE — 97530 THERAPEUTIC ACTIVITIES: CPT

## 2024-01-12 PROCEDURE — 97112 NEUROMUSCULAR REEDUCATION: CPT

## 2024-01-12 PROCEDURE — 97110 THERAPEUTIC EXERCISES: CPT

## 2024-01-12 NOTE — PROGRESS NOTES
PHYSICAL / OCCUPATIONAL THERAPY - DAILY TREATMENT NOTE    Patient Name: Eva Alan Galdino    Date: 2024    : 1975  Insurance: Payor: [facility] MEDICAID / Plan: MARILYN [facility] HEALTHKEEPERS PLUS / Product Type: *No Product type* /      Patient  verified Yes     Visit #   Current / Total 1 18   Time   In / Out 958 1036   Pain   In / Out 7/10 7/10   Subjective Functional Status/Changes: Pt stated that she is walking around her house without the scooter and boot, but has increased pain     TREATMENT AREA =  Right ankle pain [M25.571]  Abnormality of gait [R26.9]    OBJECTIVE         Therapeutic Procedures:    Tx Min Billable or 1:1 Min (if diff from Tx Min) Procedure, Rationale, Specifics   18  20147 Therapeutic Exercise (timed):  increase ROM, strength, coordination, balance, and proprioception to improve patient's ability to progress to PLOF and address remaining functional goals. (see flow sheet as applicable)     Details if applicable:       10  27916 Neuromuscular Re-Education (timed):  improve balance, coordination, kinesthetic sense, posture, core stability and proprioception to improve patient's ability to develop conscious control of individual muscles and awareness of position of extremities in order to progress to PLOF and address remaining functional goals. (see flow sheet as applicable)     Details if applicable:     10  37114 Therapeutic Activity (timed):  use of dynamic activities replicating functional movements to increase ROM, strength, coordination, balance, and proprioception in order to improve patient's ability to progress to PLOF and address remaining functional goals.  (see flow sheet as applicable)     Details if applicable:     38  [facility] Totals Reminder: bill using total billable min of TIMED therapeutic procedures (example: do not include dry needle or estim unattended, both untimed codes, in totals to left)  8-22 min = 1 unit; 23-37 min = 2 units; 38-52 min = 3 units; 53-67

## 2024-01-17 ENCOUNTER — HOSPITAL ENCOUNTER (OUTPATIENT)
Facility: HOSPITAL | Age: 49
Setting detail: RECURRING SERIES
Discharge: HOME | End: 2024-01-20
Payer: MEDICAID

## 2024-01-17 DIAGNOSIS — I10 PRIMARY HYPERTENSION: ICD-10-CM

## 2024-01-17 PROCEDURE — 97116 GAIT TRAINING THERAPY: CPT

## 2024-01-17 PROCEDURE — 97535 SELF CARE MNGMENT TRAINING: CPT

## 2024-01-17 PROCEDURE — 97112 NEUROMUSCULAR REEDUCATION: CPT

## 2024-01-17 RX ORDER — AMLODIPINE BESYLATE 10 MG/1
TABLET ORAL
Qty: 30 TABLET | Refills: 0 | OUTPATIENT
Start: 2024-01-17

## 2024-01-17 NOTE — TELEPHONE ENCOUNTER
Last Filled: 5/9/23    Last appt: 9/7/23    Next appt: Today     Labs: 4/26/23    UDS:     CSA:    Additional Notes:

## 2024-01-18 ENCOUNTER — TELEPHONE (OUTPATIENT)
Age: 49
End: 2024-01-18

## 2024-01-18 DIAGNOSIS — M67.01 CONTRACTURE OF RIGHT ACHILLES TENDON: Primary | ICD-10-CM

## 2024-01-18 NOTE — TELEPHONE ENCOUNTER
1/18/24 DME order was put in for a cane. Patient will go pick one up at formerly [facility]

## 2024-01-18 NOTE — TELEPHONE ENCOUNTER
Patient called and stating that PT is asking if the patient can get a referral for a walking loki       Please call and advise patient at   [phone]

## 2024-01-19 ENCOUNTER — HOSPITAL ENCOUNTER (OUTPATIENT)
Facility: HOSPITAL | Age: 49
Setting detail: RECURRING SERIES
Discharge: HOME | End: 2024-01-22
Payer: MEDICAID

## 2024-01-19 PROCEDURE — 97112 NEUROMUSCULAR REEDUCATION: CPT

## 2024-01-19 PROCEDURE — 97530 THERAPEUTIC ACTIVITIES: CPT

## 2024-01-19 PROCEDURE — 97110 THERAPEUTIC EXERCISES: CPT

## 2024-01-19 NOTE — PROGRESS NOTES
Procedures:    Tx Min Billable or 1:1 Min (if diff from Tx Min) Procedure, Rationale, Specifics   13 13 58077 Neuromuscular Re-Education (timed):  improve balance, coordination, kinesthetic sense, posture, core stability and proprioception to improve patient's ability to develop conscious control of individual muscles and awareness of position of extremities in order to progress to PLOF and address remaining functional goals. (see flow sheet as applicable)     Details if applicable:  standing weight shifts     15 12 00007 Therapeutic Exercise (timed):  increase ROM, strength, coordination, balance, and proprioception to improve patient's ability to progress to PLOF and address remaining functional goals. (see flow sheet as applicable)     Details if applicable:     14 14 49239 Therapeutic Activity (timed):  use of dynamic activities replicating functional movements to increase ROM, strength, coordination, balance, and proprioception in order to improve patient's ability to progress to PLOF and address remaining functional goals.  (see flow sheet as applicable)     Details if applicable: vitals assessment; STM to right calf, metatarsal mobilization with education on calf stretching and self massage for home   42 39 [facility] Totals Reminder: bill using total billable min of TIMED therapeutic procedures (example: do not include dry needle or estim unattended, both untimed codes, in totals to left)  8-22 min = 1 unit; 23-37 min = 2 units; 38-52 min = 3 units; 53-67 min = 4 units; 68-82 min = 5 units   Total Total     [x]  Patient Education billed concurrently with other procedures   [x] Review HEP    [] Progressed/Changed HEP, detail:    [] Other detail:       Objective Information/Functional Measures/Assessment    Sleeve and shoe donned for standing  Pressure to medial foot reported with standing weight shifts  Pain to heel reported with right posterior with posterior weight shift continued with right anterior but reduced

## 2024-01-22 ENCOUNTER — HOSPITAL ENCOUNTER (OUTPATIENT)
Facility: HOSPITAL | Age: 49
Setting detail: RECURRING SERIES
Discharge: HOME | End: 2024-01-25
Payer: MEDICAID

## 2024-01-22 PROCEDURE — 97110 THERAPEUTIC EXERCISES: CPT

## 2024-01-22 PROCEDURE — 97530 THERAPEUTIC ACTIVITIES: CPT

## 2024-01-22 PROCEDURE — 97112 NEUROMUSCULAR REEDUCATION: CPT

## 2024-01-22 NOTE — PROGRESS NOTES
PHYSICAL / OCCUPATIONAL THERAPY - DAILY TREATMENT NOTE    Patient Name: Eva Alan Galdino    Date: 2024    : 1975  Insurance: Payor: [facility] MEDICAID / Plan: MARILYN [facility] HEALTHKEEPERS PLUS / Product Type: *No Product type* /      Patient  verified Yes     Visit #   Current / Total 4 18   Time   In / Out 958 1048   Pain   In / Out 8/10 8/10   Subjective Functional Status/Changes: Pt stated that she did a lot of walking over the weekend with her shoes on, no boot. Reported having significant increase in pain and swelling       TREATMENT AREA =  Right ankle pain [M25.571]  Abnormality of gait [R26.9]    OBJECTIVE    Modalities Rationale:     decrease inflammation and decrease pain to improve patient's ability to progress to PLOF and address remaining functional goals.     min [] Estim Unattended, type/location:                                      []  w/ice    []  w/heat    min [] Estim Attended, type/location:                                     []  w/US     []  w/ice    []  w/heat    []  TENS insruct      min []  Mechanical Traction: type/lbs                   []  pro   []  sup   []  int   []  cont    []  before manual    []  after manual    min []  Ultrasound, settings/location:     10 min  unbill [x]  Ice     []  Heat    location/position: Supine  Right foot and ankle    min []  Paraffin,  details:     min []  Vasopneumatic Device, press/temp:     min []  Whirlpool / Fluido:    If using vaso (only need to measure limb vaso being performed on)      pre-treatment girth :       post-treatment girth :       measured at (landmark location) :      min []  Other:    Skin assessment post-treatment:   Intact      Therapeutic Procedures:    Tx Min Billable or 1:1 Min (if diff from Tx Min) Procedure, Rationale, Specifics   20  53808 Therapeutic Exercise (timed):  increase ROM, strength, coordination, balance, and proprioception to improve patient's ability to progress to PLOF and address remaining

## 2024-01-25 ENCOUNTER — HOSPITAL ENCOUNTER (OUTPATIENT)
Facility: HOSPITAL | Age: 49
Setting detail: RECURRING SERIES
Discharge: HOME | End: 2024-01-28
Payer: MEDICAID

## 2024-01-25 PROCEDURE — 97112 NEUROMUSCULAR REEDUCATION: CPT

## 2024-01-25 PROCEDURE — 97535 SELF CARE MNGMENT TRAINING: CPT

## 2024-01-25 PROCEDURE — 97530 THERAPEUTIC ACTIVITIES: CPT

## 2024-01-25 NOTE — TELEPHONE ENCOUNTER
Patient states that someone called her about getting fitted for a cane, but caller left no name.     Message was read about patient picking the cane up from [facility], but it did not state when she could pick it up.    Patient tel: [phone]

## 2024-01-25 NOTE — PROGRESS NOTES
PHYSICAL / OCCUPATIONAL THERAPY - DAILY TREATMENT NOTE    Patient Name: Eva Alan Galdino    Date: 2024    : 1975  Insurance: Payor: [facility] MEDICAID / Plan: MARILYN [facility] HEALTHKEEPERS PLUS / Product Type: *No Product type* /      Patient  verified Yes     Visit #   Current / Total 5 18   Time   In / Out 120 220   Pain   In / Out 8 8   Subjective Functional Status/Changes: Initial injury from dog bite with multi puncture wounds. ON crutches first.   Initially in bed for ~2 months. Infection noted was on 2 different kinds of antibiotics.       TREATMENT AREA =  Right ankle pain [M25.571]  Abnormality of gait [R26.9]    OBJECTIVE        Therapeutic Procedures:    92956 Neuromuscular Re-Education (timed):  improve balance, coordination, kinesthetic sense, posture, core stability and proprioception to improve patient's ability to develop conscious control of individual muscles and awareness of position of extremities in order to progress to PLOF and address remaining functional goals.   Tx Min Billable or 1:1 Min   (if diff from Tx Min) Details:   20  Education and MLD instruction for decreased swelling and improved proprioception      60209 Therapeutic Activity (timed):  use of dynamic activities replicating functional movements to increase ROM, strength, coordination, balance, and proprioception in order to improve patient's ability to progress to PLOF and address remaining functional goals.   Tx Min Billable or 1:1 Min   (if diff from Tx Min) Details:   20  Review of  importance of amb and and weaning off of boot and scooter to facilitate normal joint mechanics and lymphatic muscle pump     02326 Self Care/Home Management (timed):  improve patient knowledge and understanding of pain reducing techniques, positioning, posture/ergonomics, activity modification, diagnosis/prognosis, and physical therapy expectations, procedures and progression  to improve patient's ability to progress to PLOF and

## 2024-01-30 ENCOUNTER — HOSPITAL ENCOUNTER (OUTPATIENT)
Facility: HOSPITAL | Age: 49
Setting detail: RECURRING SERIES
Discharge: HOME | End: 2024-02-02
Payer: MEDICAID

## 2024-01-30 PROCEDURE — 97530 THERAPEUTIC ACTIVITIES: CPT

## 2024-01-30 PROCEDURE — 97112 NEUROMUSCULAR REEDUCATION: CPT

## 2024-01-30 PROCEDURE — 97110 THERAPEUTIC EXERCISES: CPT

## 2024-01-30 NOTE — PROGRESS NOTES
PHYSICAL / OCCUPATIONAL THERAPY - DAILY TREATMENT NOTE    Patient Name: Eva Alan Galdino    Date: 2024    : 1975  Insurance: Payor: [facility] MEDICAID / Plan: MARILYN [facility] HEALTHKEEPERS PLUS / Product Type: *No Product type* /      Patient  verified Yes     Visit #   Current / Total 6 18   Time   In / Out 200 238   Pain   In / Out 7/10 8/10   Subjective Functional Status/Changes: Pt stated that she is trying to walk at home more without the boot     TREATMENT AREA =  Right ankle pain [M25.571]  Abnormality of gait [R26.9]    OBJECTIVE    Therapeutic Procedures:    Tx Min Billable or 1:1 Min (if diff from Tx Min) Procedure, Rationale, Specifics   18  41607 Therapeutic Exercise (timed):  increase ROM, strength, coordination, balance, and proprioception to improve patient's ability to progress to PLOF and address remaining functional goals. (see flow sheet as applicable)     Details if applicable:       10  99154 Neuromuscular Re-Education (timed):  improve balance, coordination, kinesthetic sense, posture, core stability and proprioception to improve patient's ability to develop conscious control of individual muscles and awareness of position of extremities in order to progress to PLOF and address remaining functional goals. (see flow sheet as applicable)     Details if applicable:     10  04471 Therapeutic Activity (timed):  use of dynamic activities replicating functional movements to increase ROM, strength, coordination, balance, and proprioception in order to improve patient's ability to progress to PLOF and address remaining functional goals.  (see flow sheet as applicable)     Details if applicable:     38  [facility] Totals Reminder: bill using total billable min of TIMED therapeutic procedures (example: do not include dry needle or estim unattended, both untimed codes, in totals to left)  8-22 min = 1 unit; 23-37 min = 2 units; 38-52 min = 3 units; 53-67 min = 4 units; 68-82 min = 5 units

## 2024-01-31 ENCOUNTER — HOSPITAL ENCOUNTER (OUTPATIENT)
Facility: HOSPITAL | Age: 49
Setting detail: RECURRING SERIES
Discharge: HOME | End: 2024-02-03
Payer: MEDICAID

## 2024-01-31 PROCEDURE — 97530 THERAPEUTIC ACTIVITIES: CPT

## 2024-01-31 PROCEDURE — 97535 SELF CARE MNGMENT TRAINING: CPT

## 2024-01-31 NOTE — PROGRESS NOTES
PHYSICAL / OCCUPATIONAL THERAPY - DAILY TREATMENT NOTE    Patient Name: Eva Alan Galdino    Date: 2024    : 1975  Insurance: Payor: [facility] MEDICAID / Plan: MARILYN [facility] HEALTHKEEPERS PLUS / Product Type: *No Product type* /      Patient  verified yes   Visit #   Current / Total 7 18   Time   In / Out 10:41A 11:24P   Pain   In / Out 8/10 8/10   Subjective Functional Status/Changes: Patient reports she cannot wear the brace with the tennis shoe. She is still taking more Tylenol. She has been leaving messages for pain management but has not heard from them. She had a lot of pain trying to do the stretch with the belt to her calf after last time. She should be getting the cane and compression sock soon.     TREATMENT AREA =  Right ankle pain [M25.571]  Abnormality of gait [R26.9]    OBJECTIVE        Therapeutic Procedures:    Tx Min Billable or 1:1 Min (if diff from Tx Min) Procedure, Rationale, Specifics   29  85770 Therapeutic Activity (timed):  use of dynamic activities replicating functional movements to increase ROM, strength, coordination, balance, and proprioception in order to improve patient's ability to progress to PLOF and address remaining functional goals.  (see flow sheet as applicable)     Details if applicable:  adhesive foam added to sides of brace; issuance of Heelbo sock for heel support/cushion; issued 2x2 squares of blue and black foam; goal assessment     14  38100 Self Care/Home Management (timed):  improve patient knowledge and understanding of pain reducing techniques, home safety, activity modification, diagnosis/prognosis, and physical therapy expectations, procedures and progression  to improve patient's ability to progress to PLOF and address remaining functional goals.  (see flow sheet as applicable)     Details if applicable:  patient education   43  [facility] Totals Reminder: bill using total billable min of TIMED therapeutic procedures (example: do not include dry

## 2024-01-31 NOTE — THERAPY RECERTIFICATION
FELIPA [facility]  [address] [place] [address] - Ph: [phone]   Fx: [phone]  PHYSICAL THERAPY PROGRESS NOTE  [x] Progress Note  [] Discharge Summary    Patient Name: Eva Ahmadi : 1975   Treatment/Medical Diagnosis: Right ankle pain [M25.571]  Abnormality of gait [R26.9]   Referral Source: Raf Marion MD     Date of Initial Visit:  Attended Visits: 10 Missed Visits: 1       Comorbidities: Depression, HTN, PVD, history of CVA and multiple TIAs, lymphedema   Prior Level of Function:    lives with son in 2 story home; working as cook at [employer]       SUMMARY OF TREATMENT  Patient making fair progress towards goals at this time. She continues to report increased pain (10/10 at max) with prolonged standing/ambulation without the boot but has been trying to not wear it at all. Patient educated on gradually increasing time with shoe donned in standing. She has difficulty with wearing brace as it is uncomfortable to the sides of her leg and she has difficulty with it fitting in a shoe. She continues with pain to calcaneus and was issued Heelbo and foam padding to address. Patient reports she has not yet received a response from pain management. She reports continued burning/pressure to her medial foot and hypersensitivity surrounding scars. She has been able to progress to WB/weight shifting in B shoes with continued medial foot pain with increased ankle DF. Patient screened by CLT and was educated on manual interventions and compression garment wear to address continued edema. Patient has been educated on progressing with weightbearing for natural lymphatic pump benefits and that this will not cause damage despite her feeling pain. FOTO score decreased to 25/100 points. Right ankle inversion/eversion AROM has improved; DF/PF AROM has decreased. Patient able to ambulate ~60ft with SBQC with cues for

## 2024-02-06 ENCOUNTER — HOSPITAL ENCOUNTER (OUTPATIENT)
Facility: HOSPITAL | Age: 49
Setting detail: RECURRING SERIES
Discharge: HOME | End: 2024-02-09
Payer: MEDICAID

## 2024-02-06 PROCEDURE — 97110 THERAPEUTIC EXERCISES: CPT

## 2024-02-06 PROCEDURE — 97530 THERAPEUTIC ACTIVITIES: CPT

## 2024-02-06 PROCEDURE — 97112 NEUROMUSCULAR REEDUCATION: CPT

## 2024-02-06 NOTE — PROGRESS NOTES
PHYSICAL / OCCUPATIONAL THERAPY - DAILY TREATMENT NOTE    Patient Name: Eva Alan Galdino    Date: 2024    : 1975  Insurance: Payor: [facility] MEDICAID / Plan: MARILYN [facility] HEALTHKEEPERS PLUS / Product Type: *No Product type* /      Patient  verified Yes     Visit #   Current / Total 1 18   Time   In / Out 1000 1038   Pain   In / Out 8/10 10/10   Subjective Functional Status/Changes: Pt stated that she is all right today. Came with SPC, but stated that she is ordering a quad cane as this one doesn't work     TREATMENT AREA =  Right ankle pain [M25.571]  Abnormality of gait [R26.9]    OBJECTIVE         Therapeutic Procedures:    Tx Min Billable or 1:1 Min (if diff from Tx Min) Procedure, Rationale, Specifics   10  34329 Therapeutic Exercise (timed):  increase ROM, strength, coordination, balance, and proprioception to improve patient's ability to progress to PLOF and address remaining functional goals. (see flow sheet as applicable)     Details if applicable:       18  93838 Neuromuscular Re-Education (timed):  improve balance, coordination, kinesthetic sense, posture, core stability and proprioception to improve patient's ability to develop conscious control of individual muscles and awareness of position of extremities in order to progress to PLOF and address remaining functional goals. (see flow sheet as applicable)     Details if applicable:     10  65022 Therapeutic Activity (timed):  use of dynamic activities replicating functional movements to increase ROM, strength, coordination, balance, and proprioception in order to improve patient's ability to progress to PLOF and address remaining functional goals.  (see flow sheet as applicable)     Details if applicable:     38  [facility] Totals Reminder: bill using total billable min of TIMED therapeutic procedures (example: do not include dry needle or estim unattended, both untimed codes, in totals to left)  8-22 min = 1 unit; 23-37 min = 2 units;

## 2024-02-09 ENCOUNTER — HOSPITAL ENCOUNTER (OUTPATIENT)
Facility: HOSPITAL | Age: 49
Setting detail: RECURRING SERIES
End: 2024-02-09
Payer: MEDICAID

## 2024-02-13 ENCOUNTER — TELEPHONE (OUTPATIENT)
Age: 49
End: 2024-02-13

## 2024-02-13 NOTE — TELEPHONE ENCOUNTER
Patient has not heard from Dr. Bullock's office to schedule pain mgmt and is asking if there's anything we can do to assist.  She has left multiple voicemails for them with no returned call.  Referral was faxed to them 12/21/23.  Are we able to assist or advise of a reasonable scheduling expectation?  Patient isn't sure if she should try another location.  Please review and advise patient at [phone].

## 2024-02-13 NOTE — TELEPHONE ENCOUNTER
Patient states that she is willing to go to another facility that will accept her and that will accept her insurance, she also said that she do not know of any pain mgmt facility.    Patient tel: [phone]

## 2024-02-14 ENCOUNTER — HOSPITAL ENCOUNTER (OUTPATIENT)
Facility: HOSPITAL | Age: 49
Setting detail: RECURRING SERIES
Discharge: HOME | End: 2024-02-17
Payer: MEDICAID

## 2024-02-14 PROCEDURE — 97112 NEUROMUSCULAR REEDUCATION: CPT

## 2024-02-14 PROCEDURE — 97535 SELF CARE MNGMENT TRAINING: CPT

## 2024-02-14 NOTE — PROGRESS NOTES
PHYSICAL / OCCUPATIONAL THERAPY - DAILY TREATMENT NOTE    Patient Name: Eva Alan Galdino    Date: 2024    : 1975  Insurance: Payor: [facility] MEDICAID / Plan: MARILYN [facility] HEALTHKEEPERS PLUS / Product Type: *No Product type* /      Patient  verified yes   Visit #   Current / Total 2 18   Time   In / Out 2:41P 3:19P   Pain   In / Out 10/10 10/10   Subjective Functional Status/Changes: Patient reports she has been walking without her boot so her pain is up. She got into a new pain management but has not heard from them. The foam has been helpful with the brace. She can walk in the shoe for about 30 min. She has to schedule a follow up with the doctor. She has the quad base coming. Doing the exercises at night helps the swelling and this has been better but she will get pain up to her shin. She has had more swelling up in her thigh but the swelling in the ankle is better. The bone in her foot still hurts; she wants to follow up with the doctor to get another x-ray.      TREATMENT AREA =  Right ankle pain [M25.571]  Abnormality of gait [R26.9]    OBJECTIVE        Therapeutic Procedures:    Tx Min Billable or 1:1 Min (if diff from Tx Min) Procedure, Rationale, Specifics   11 11 71982 Self Care/Home Management (timed):  improve patient knowledge and understanding of pain reducing techniques, home safety, activity modification, diagnosis/prognosis, and physical therapy expectations, procedures and progression  to improve patient's ability to progress to PLOF and address remaining functional goals.  (see flow sheet as applicable)     Details if applicable:  wearing boot/slipper for comfort with stairs but trial of shoe and brace wear for flat surfaces; continuing with gradual build up of tolerance to ambulation in shoe; review of MD protocol of progressing with WBAT in shoe per Dec. Visit note     13 7 21528 Therapeutic Exercise (timed):  increase ROM, strength, coordination, balance, and proprioception

## 2024-02-16 ENCOUNTER — HOSPITAL ENCOUNTER (OUTPATIENT)
Facility: HOSPITAL | Age: 49
Setting detail: RECURRING SERIES
Discharge: HOME | End: 2024-02-19
Payer: MEDICAID

## 2024-02-16 PROCEDURE — 97110 THERAPEUTIC EXERCISES: CPT

## 2024-02-16 PROCEDURE — 97112 NEUROMUSCULAR REEDUCATION: CPT

## 2024-02-16 PROCEDURE — 97116 GAIT TRAINING THERAPY: CPT

## 2024-02-16 NOTE — PROGRESS NOTES
PHYSICAL / OCCUPATIONAL THERAPY - DAILY TREATMENT NOTE    Patient Name: Eva Alan Galdino    Date: 2024    : 1975  Insurance: Payor: [facility] MEDICAID / Plan: MARILYN [facility] HEALTHKEEPERS PLUS / Product Type: *No Product type* /      Patient  verified yes   Visit #   Current / Total 3 18   Time   In / Out 10:41A 11:24A   Pain   In / Out 8/10 7/10   Subjective Functional Status/Changes: Patient reports she had some heel pain when she left but she could handle it. She did not bring her brace but wants to try things without it. She really just walks at home in the shoe; she does not really weight shift. The cane she ordered has a rounded base but is small.     TREATMENT AREA =  Right ankle pain [M25.571]  Abnormality of gait [R26.9]    OBJECTIVE        Therapeutic Procedures:    Tx Min Billable or 1:1 Min (if diff from Tx Min) Procedure, Rationale, Specifics   13  79205 Gait Training (timed):      feet with SPC (assistive device) over even surfaces with supervision to standby level of assist. Cuing for heel/toe pattern and looking upright.  To improve safety and dynamic movement with household/community ambulation.  (see flow sheet as applicable)     Details if applicable:       18  83889 Therapeutic Exercise (timed):  increase ROM, strength, coordination, balance, and proprioception to improve patient's ability to progress to PLOF and address remaining functional goals. (see flow sheet as applicable)     Details if applicable:     12  69643 Neuromuscular Re-Education (timed):  improve balance, coordination, kinesthetic sense, posture, core stability and proprioception to improve patient's ability to develop conscious control of individual muscles and awareness of position of extremities in order to progress to PLOF and address remaining functional goals. (see flow sheet as applicable)     Details if applicable:  standing balance   43  [facility] Totals Reminder: bill using total billable min of TIMED

## 2024-02-21 ENCOUNTER — HOSPITAL ENCOUNTER (OUTPATIENT)
Facility: HOSPITAL | Age: 49
Setting detail: RECURRING SERIES
Discharge: HOME | End: 2024-02-24
Payer: MEDICAID

## 2024-02-21 PROCEDURE — 97116 GAIT TRAINING THERAPY: CPT

## 2024-02-21 PROCEDURE — 97112 NEUROMUSCULAR REEDUCATION: CPT

## 2024-02-21 PROCEDURE — 97530 THERAPEUTIC ACTIVITIES: CPT

## 2024-02-21 PROCEDURE — 97110 THERAPEUTIC EXERCISES: CPT

## 2024-02-21 NOTE — PROGRESS NOTES
PHYSICAL / OCCUPATIONAL THERAPY - DAILY TREATMENT NOTE    Patient Name: Eva Alan Galdino    Date: 2024    : 1975  Insurance: Payor: [facility] MEDICAID / Plan: MARILYN [facility] HEALTHKEEPERS PLUS / Product Type: *No Product type* /      Patient  verified yes   Visit #   Current / Total 4 18   Time   In / Out 10:41 11:20   Pain   In / Out 10/10 10/10   Subjective Functional Status/Changes: Pt reports using red and green bands at home, the red is easier. She is still waiting to hear back from pain management. She sees Dr. Marion 3/6/24 and will have another x-ray.     Her ankle hurts a lot still like a sharp stabbing pain and sometimes pins and needles. She still ices at home because her leg swells.      TREATMENT AREA =  Right ankle pain [M25.571]  Abnormality of gait [R26.9]    OBJECTIVE    Therapeutic Procedures:    Tx Min Billable or 1:1 Min (if diff from Tx Min) Procedure, Rationale, Specifics   9  15077 Gait Training (timed):      feet with SPC (assistive device) over even surfaces with supervision to standby level of assist. Cuing for heel/toe pattern and looking upright.  To improve safety and dynamic movement with household/community ambulation.  (see flow sheet as applicable)     Details if applicable:  ambulation with SPC and ambulation in parallel bars with cuing for increased step length and heel to toe pattern especially on right      20  04962 Therapeutic Exercise (timed):  increase ROM, strength, coordination, balance, and proprioception to improve patient's ability to progress to PLOF and address remaining functional goals. (see flow sheet as applicable)     Details if applicable:       10  25758 Neuromuscular Re-Education (timed):  improve balance, coordination, kinesthetic sense, posture, core stability and proprioception to improve patient's ability to develop conscious control of individual muscles and awareness of position of extremities in order to progress to PLOF and address

## 2024-02-23 ENCOUNTER — APPOINTMENT (OUTPATIENT)
Facility: HOSPITAL | Age: 49
End: 2024-02-23
Payer: MEDICAID

## 2024-02-23 ENCOUNTER — HOSPITAL ENCOUNTER (EMERGENCY)
Facility: HOSPITAL | Age: 49
Discharge: HOME | End: 2024-02-24
Attending: EMERGENCY MEDICINE
Payer: MEDICAID

## 2024-02-23 VITALS
DIASTOLIC BLOOD PRESSURE: 97 MMHG | OXYGEN SATURATION: 99 % | TEMPERATURE: 98.2 F | SYSTOLIC BLOOD PRESSURE: 151 MMHG | HEIGHT: 65 IN | HEART RATE: 111 BPM | WEIGHT: 172.3 LBS | RESPIRATION RATE: 21 BRPM | BODY MASS INDEX: 28.71 KG/M2

## 2024-02-23 DIAGNOSIS — K52.9 GASTROENTERITIS: Primary | ICD-10-CM

## 2024-02-23 DIAGNOSIS — E87.6 HYPOKALEMIA: ICD-10-CM

## 2024-02-23 LAB
ALBUMIN SERPL-MCNC: 3.4 G/DL (ref 3.4–5)
ALBUMIN/GLOB SERPL: 1 (ref 0.8–1.7)
ALP SERPL-CCNC: 85 U/L (ref 45–117)
ALT SERPL-CCNC: 15 U/L (ref 13–56)
AMPHET UR QL SCN: NEGATIVE
ANION GAP SERPL CALC-SCNC: 7 MMOL/L (ref 3–18)
APPEARANCE UR: CLEAR
AST SERPL-CCNC: 13 U/L (ref 10–38)
BACTERIA URNS QL MICRO: ABNORMAL /HPF
BARBITURATES UR QL SCN: NEGATIVE
BASOPHILS # BLD: 0 K/UL (ref 0–0.1)
BASOPHILS NFR BLD: 0 % (ref 0–2)
BENZODIAZ UR QL: NEGATIVE
BILIRUB SERPL-MCNC: 0.4 MG/DL (ref 0.2–1)
BILIRUB UR QL: NEGATIVE
BUN SERPL-MCNC: 12 MG/DL (ref 7–18)
BUN/CREAT SERPL: 17 (ref 12–20)
CA-I SERPL-SCNC: 1.04 MMOL/L (ref 1.15–1.33)
CALCIUM SERPL-MCNC: 7.6 MG/DL (ref 8.5–10.1)
CANNABINOIDS UR QL SCN: POSITIVE
CHLORIDE SERPL-SCNC: 115 MMOL/L (ref 100–111)
CK SERPL-CCNC: 132 U/L (ref 26–192)
CO2 SERPL-SCNC: 21 MMOL/L (ref 21–32)
COCAINE UR QL SCN: NEGATIVE
COLOR UR: YELLOW
CREAT SERPL-MCNC: 0.69 MG/DL (ref 0.6–1.3)
DIFFERENTIAL METHOD BLD: ABNORMAL
EOSINOPHIL # BLD: 0 K/UL (ref 0–0.4)
EOSINOPHIL NFR BLD: 0 % (ref 0–5)
EPITH CASTS URNS QL MICRO: ABNORMAL /LPF (ref 0–5)
ERYTHROCYTE [DISTWIDTH] IN BLOOD BY AUTOMATED COUNT: 14.3 % (ref 11.6–14.5)
ETHANOL SERPL-MCNC: <3 MG/DL (ref 0–3)
FLUAV RNA SPEC QL NAA+PROBE: NOT DETECTED
FLUBV RNA SPEC QL NAA+PROBE: NOT DETECTED
FOLATE SERPL-MCNC: 12.3 NG/ML (ref 3.1–17.5)
GGT SERPL-CCNC: 66 U/L (ref 5–55)
GLOBULIN SER CALC-MCNC: 3.4 G/DL (ref 2–4)
GLUCOSE SERPL-MCNC: 114 MG/DL (ref 74–99)
GLUCOSE UR STRIP.AUTO-MCNC: NEGATIVE MG/DL
HCG SERPL QL: NEGATIVE
HCT VFR BLD AUTO: 42 % (ref 35–45)
HGB BLD-MCNC: 14.4 G/DL (ref 12–16)
HGB UR QL STRIP: ABNORMAL
IMM GRANULOCYTES # BLD AUTO: 0.1 K/UL (ref 0–0.04)
IMM GRANULOCYTES NFR BLD AUTO: 1 % (ref 0–0.5)
INR PPP: 1 (ref 0.9–1.1)
KETONES UR QL STRIP.AUTO: 40 MG/DL
LACTATE SERPL-SCNC: 4.1 MMOL/L (ref 0.4–2)
LACTATE SERPL-SCNC: 7.8 MMOL/L (ref 0.4–2)
LEUKOCYTE ESTERASE UR QL STRIP.AUTO: NEGATIVE
LIPASE SERPL-CCNC: 30 U/L (ref 13–75)
LYMPHOCYTES # BLD: 2.1 K/UL (ref 0.9–3.6)
LYMPHOCYTES NFR BLD: 12 % (ref 21–52)
Lab: ABNORMAL
MAGNESIUM SERPL-MCNC: 1.7 MG/DL (ref 1.6–2.6)
MCH RBC QN AUTO: 31.1 PG (ref 24–34)
MCHC RBC AUTO-ENTMCNC: 34.3 G/DL (ref 31–37)
MCV RBC AUTO: 90.7 FL (ref 78–100)
METHADONE UR QL: NEGATIVE
MONOCYTES # BLD: 0.9 K/UL (ref 0.05–1.2)
MONOCYTES NFR BLD: 5 % (ref 3–10)
NEUTS SEG # BLD: 14.1 K/UL (ref 1.8–8)
NEUTS SEG NFR BLD: 82 % (ref 40–73)
NITRITE UR QL STRIP.AUTO: NEGATIVE
NRBC # BLD: 0 K/UL (ref 0–0.01)
NRBC BLD-RTO: 0 PER 100 WBC
OPIATES UR QL: NEGATIVE
PCP UR QL: NEGATIVE
PH UR STRIP: 8 (ref 5–8)
PLATELET # BLD AUTO: 273 K/UL (ref 135–420)
PMV BLD AUTO: 10 FL (ref 9.2–11.8)
POTASSIUM SERPL-SCNC: 3 MMOL/L (ref 3.5–5.5)
PROCALCITONIN SERPL-MCNC: 0.05 NG/ML
PROT SERPL-MCNC: 6.8 G/DL (ref 6.4–8.2)
PROT UR STRIP-MCNC: 100 MG/DL
PROTHROMBIN TIME: 13.3 SEC (ref 11.9–14.7)
RBC # BLD AUTO: 4.63 M/UL (ref 4.2–5.3)
RBC #/AREA URNS HPF: ABNORMAL /HPF (ref 0–5)
SARS-COV-2 RNA RESP QL NAA+PROBE: NOT DETECTED
SODIUM SERPL-SCNC: 143 MMOL/L (ref 136–145)
SP GR UR REFRACTOMETRY: >1.03 (ref 1–1.03)
TROPONIN I SERPL HS-MCNC: 3 NG/L (ref 0–54)
UROBILINOGEN UR QL STRIP.AUTO: 1 EU/DL (ref 0.2–1)
VIT B12 SERPL-MCNC: 272 PG/ML (ref 211–911)
WBC # BLD AUTO: 17.2 K/UL (ref 4.6–13.2)
WBC URNS QL MICRO: NEGATIVE /HPF (ref 0–4)

## 2024-02-23 PROCEDURE — 71260 CT THORAX DX C+: CPT

## 2024-02-23 PROCEDURE — 82550 ASSAY OF CK (CPK): CPT

## 2024-02-23 PROCEDURE — 84484 ASSAY OF TROPONIN QUANT: CPT

## 2024-02-23 PROCEDURE — 82607 VITAMIN B-12: CPT

## 2024-02-23 PROCEDURE — 87636 SARSCOV2 & INF A&B AMP PRB: CPT

## 2024-02-23 PROCEDURE — 81001 URINALYSIS AUTO W/SCOPE: CPT

## 2024-02-23 PROCEDURE — 84145 PROCALCITONIN (PCT): CPT

## 2024-02-23 PROCEDURE — 76705 ECHO EXAM OF ABDOMEN: CPT

## 2024-02-23 PROCEDURE — 6360000004 HC RX CONTRAST MEDICATION: Performed by: EMERGENCY MEDICINE

## 2024-02-23 PROCEDURE — 2580000003 HC RX 258: Performed by: HEALTH CARE PROVIDER

## 2024-02-23 PROCEDURE — 93005 ELECTROCARDIOGRAM TRACING: CPT | Performed by: HEALTH CARE PROVIDER

## 2024-02-23 PROCEDURE — A4216 STERILE WATER/SALINE, 10 ML: HCPCS | Performed by: HEALTH CARE PROVIDER

## 2024-02-23 PROCEDURE — 99285 EMERGENCY DEPT VISIT HI MDM: CPT

## 2024-02-23 PROCEDURE — 96375 TX/PRO/DX INJ NEW DRUG ADDON: CPT

## 2024-02-23 PROCEDURE — 83690 ASSAY OF LIPASE: CPT

## 2024-02-23 PROCEDURE — 6370000000 HC RX 637 (ALT 250 FOR IP): Performed by: EMERGENCY MEDICINE

## 2024-02-23 PROCEDURE — 96366 THER/PROPH/DIAG IV INF ADDON: CPT

## 2024-02-23 PROCEDURE — 82077 ASSAY SPEC XCP UR&BREATH IA: CPT

## 2024-02-23 PROCEDURE — 82746 ASSAY OF FOLIC ACID SERUM: CPT

## 2024-02-23 PROCEDURE — C9113 INJ PANTOPRAZOLE SODIUM, VIA: HCPCS | Performed by: HEALTH CARE PROVIDER

## 2024-02-23 PROCEDURE — 2580000003 HC RX 258: Performed by: EMERGENCY MEDICINE

## 2024-02-23 PROCEDURE — 6360000002 HC RX W HCPCS: Performed by: HEALTH CARE PROVIDER

## 2024-02-23 PROCEDURE — 6360000002 HC RX W HCPCS: Performed by: EMERGENCY MEDICINE

## 2024-02-23 PROCEDURE — 87040 BLOOD CULTURE FOR BACTERIA: CPT

## 2024-02-23 PROCEDURE — 83735 ASSAY OF MAGNESIUM: CPT

## 2024-02-23 PROCEDURE — 96361 HYDRATE IV INFUSION ADD-ON: CPT

## 2024-02-23 PROCEDURE — 85610 PROTHROMBIN TIME: CPT

## 2024-02-23 PROCEDURE — 80053 COMPREHEN METABOLIC PANEL: CPT

## 2024-02-23 PROCEDURE — 83605 ASSAY OF LACTIC ACID: CPT

## 2024-02-23 PROCEDURE — 96365 THER/PROPH/DIAG IV INF INIT: CPT

## 2024-02-23 PROCEDURE — 84703 CHORIONIC GONADOTROPIN ASSAY: CPT

## 2024-02-23 PROCEDURE — 82330 ASSAY OF CALCIUM: CPT

## 2024-02-23 PROCEDURE — 96376 TX/PRO/DX INJ SAME DRUG ADON: CPT

## 2024-02-23 PROCEDURE — 80307 DRUG TEST PRSMV CHEM ANLYZR: CPT

## 2024-02-23 PROCEDURE — 82977 ASSAY OF GGT: CPT

## 2024-02-23 PROCEDURE — 85025 COMPLETE CBC W/AUTO DIFF WBC: CPT

## 2024-02-23 PROCEDURE — 71045 X-RAY EXAM CHEST 1 VIEW: CPT

## 2024-02-23 RX ORDER — SODIUM CHLORIDE 9 MG/ML
INJECTION, SOLUTION INTRAVENOUS CONTINUOUS
Status: DISCONTINUED | OUTPATIENT
Start: 2024-02-23 | End: 2024-02-23

## 2024-02-23 RX ORDER — CALCIUM GLUCONATE 94 MG/ML
1000 INJECTION, SOLUTION INTRAVENOUS
Status: COMPLETED | OUTPATIENT
Start: 2024-02-23 | End: 2024-02-23

## 2024-02-23 RX ORDER — SODIUM CHLORIDE, SODIUM LACTATE, POTASSIUM CHLORIDE, AND CALCIUM CHLORIDE .6; .31; .03; .02 G/100ML; G/100ML; G/100ML; G/100ML
2000 INJECTION, SOLUTION INTRAVENOUS ONCE
Status: COMPLETED | OUTPATIENT
Start: 2024-02-23 | End: 2024-02-23

## 2024-02-23 RX ORDER — KETOROLAC TROMETHAMINE 15 MG/ML
15 INJECTION, SOLUTION INTRAMUSCULAR; INTRAVENOUS
Status: COMPLETED | OUTPATIENT
Start: 2024-02-23 | End: 2024-02-23

## 2024-02-23 RX ORDER — ONDANSETRON 2 MG/ML
4 INJECTION INTRAMUSCULAR; INTRAVENOUS
Status: COMPLETED | OUTPATIENT
Start: 2024-02-23 | End: 2024-02-23

## 2024-02-23 RX ORDER — PROCHLORPERAZINE EDISYLATE 5 MG/ML
10 INJECTION INTRAMUSCULAR; INTRAVENOUS
Status: COMPLETED | OUTPATIENT
Start: 2024-02-24 | End: 2024-02-23

## 2024-02-23 RX ORDER — 0.9 % SODIUM CHLORIDE 0.9 %
2000 INTRAVENOUS SOLUTION INTRAVENOUS ONCE
Status: COMPLETED | OUTPATIENT
Start: 2024-02-23 | End: 2024-02-24

## 2024-02-23 RX ORDER — CALCIUM CARBONATE 500 MG/1
500 TABLET, CHEWABLE ORAL
Status: COMPLETED | OUTPATIENT
Start: 2024-02-23 | End: 2024-02-23

## 2024-02-23 RX ADMIN — POTASSIUM BICARBONATE 40 MEQ: 782 TABLET, EFFERVESCENT ORAL at 20:08

## 2024-02-23 RX ADMIN — SODIUM CHLORIDE, POTASSIUM CHLORIDE, SODIUM LACTATE AND CALCIUM CHLORIDE 2000 ML: 600; 310; 30; 20 INJECTION, SOLUTION INTRAVENOUS at 20:08

## 2024-02-23 RX ADMIN — PROCHLORPERAZINE EDISYLATE 10 MG: 5 INJECTION INTRAMUSCULAR; INTRAVENOUS at 23:54

## 2024-02-23 RX ADMIN — CALCIUM CARBONATE (ANTACID) CHEW TAB 500 MG 500 MG: 500 CHEW TAB at 20:08

## 2024-02-23 RX ADMIN — SODIUM CHLORIDE 2000 ML: 9 INJECTION, SOLUTION INTRAVENOUS at 22:45

## 2024-02-23 RX ADMIN — PIPERACILLIN AND TAZOBACTAM 4500 MG: 4; .5 INJECTION, POWDER, FOR SOLUTION INTRAVENOUS at 21:56

## 2024-02-23 RX ADMIN — IOPAMIDOL 100 ML: 612 INJECTION, SOLUTION INTRAVENOUS at 20:51

## 2024-02-23 RX ADMIN — ONDANSETRON 4 MG: 2 INJECTION INTRAMUSCULAR; INTRAVENOUS at 19:56

## 2024-02-23 RX ADMIN — PANTOPRAZOLE SODIUM 40 MG: 40 INJECTION, POWDER, FOR SOLUTION INTRAVENOUS at 20:03

## 2024-02-23 RX ADMIN — KETOROLAC TROMETHAMINE 15 MG: 15 INJECTION, SOLUTION INTRAMUSCULAR; INTRAVENOUS at 18:35

## 2024-02-23 RX ADMIN — CALCIUM GLUCONATE 1000 MG: 98 INJECTION, SOLUTION INTRAVENOUS at 19:58

## 2024-02-23 ASSESSMENT — ENCOUNTER SYMPTOMS
ANAL BLEEDING: 0
NAUSEA: 1
DIARRHEA: 0
ABDOMINAL PAIN: 1
CONSTIPATION: 0
CHEST TIGHTNESS: 0
ABDOMINAL DISTENTION: 0
SHORTNESS OF BREATH: 0
RECTAL PAIN: 0
COUGH: 0
VOMITING: 1
BLOOD IN STOOL: 0

## 2024-02-23 ASSESSMENT — PAIN - FUNCTIONAL ASSESSMENT: PAIN_FUNCTIONAL_ASSESSMENT: 0-10

## 2024-02-23 ASSESSMENT — PAIN DESCRIPTION - LOCATION: LOCATION: ABDOMEN

## 2024-02-23 NOTE — ED TRIAGE NOTES
Pt presents with multiple c/o of N,V, abdominal pain, states \"her arms and legs hurt and her face is twitching\". Smile symmetrical pt able to stand and move self over to scale and stretcher, Hx noted, MD at bedside to evaluate for possible stroke alert. No other s/s present to this RN. Denies hx of DM.

## 2024-02-23 NOTE — ED PROVIDER NOTES
Metabolic Panel    Collection Time: 02/23/24  6:27 PM   Result Value Ref Range    Sodium 143 136 - 145 mmol/L    Potassium 3.0 (L) 3.5 - 5.5 mmol/L    Chloride 115 (H) 100 - 111 mmol/L    CO2 21 21 - 32 mmol/L    Anion Gap 7 3.0 - 18 mmol/L    Glucose 114 (H) 74 - 99 mg/dL    BUN 12 7.0 - 18 MG/DL    Creatinine 0.69 0.6 - 1.3 MG/DL    Bun/Cre Ratio 17 12 - 20      Est, Glom Filt Rate >60 >60 ml/min/1.73m2    Calcium 7.6 (L) 8.5 - 10.1 MG/DL    Total Bilirubin 0.4 0.2 - 1.0 MG/DL    ALT 15 13 - 56 U/L    AST 13 10 - 38 U/L    Alk Phosphatase 85 45 - 117 U/L    Total Protein 6.8 6.4 - 8.2 g/dL    Albumin 3.4 3.4 - 5.0 g/dL    Globulin 3.4 2.0 - 4.0 g/dL    Albumin/Globulin Ratio 1.0 0.8 - 1.7     Ethanol    Collection Time: 02/23/24  6:27 PM   Result Value Ref Range    Ethanol Lvl <3 0 - 3 MG/DL   Lactate, Sepsis    Collection Time: 02/23/24  6:27 PM   Result Value Ref Range    Lactic Acid, Sepsis 4.1 (HH) 0.4 - 2.0 MMOL/L   Procalcitonin    Collection Time: 02/23/24  6:27 PM   Result Value Ref Range    Procalcitonin 0.05 ng/mL   Calcium, Ionized    Collection Time: 02/23/24  6:27 PM   Result Value Ref Range    IONIZED CALCIUM 1.04 (L) 1.15 - 1.33 MMOL/L   EKG 12 Lead    Collection Time: 02/23/24  7:04 PM   Result Value Ref Range    Ventricular Rate 130 BPM    Atrial Rate 130 BPM    P-R Interval 110 ms    QRS Duration 74 ms    Q-T Interval 318 ms    QTc Calculation (Bazett) 467 ms    P Axis 68 degrees    R Axis 76 degrees    T Axis 66 degrees    Diagnosis       Sinus tachycardia with short AZ  Nonspecific ST abnormality  Abnormal ECG  When compared with ECG of 29-OCT-2021 17:56,  AZ interval has decreased  Vent. rate has increased BY  63 BPM  T wave amplitude has increased in Anterior leads  Nonspecific T wave abnormality now evident in Lateral leads           Radiologic Studies -   Non-plain film images such as CT, Ultrasound and MRI are read by the radiologist. Plain radiographic images are visualized and  liters IVF bolus ordered    Repeat lactate level: ordered and pending at this time    Reassessment Exam:  Pending at time of note writing      ED Course:   ED Course as of 02/23/24 2009 Fri Feb 23, 2024 1913 EKG, sinus tachycardia at 130 bpm, no ST segment changes or T wave inversion, normal axis [AS]      ED Course User Index  [AS] Gabino Oliver PA-C       No diagnosis found.    DISPOSITION        Orders Placed This Encounter   Medications    DISCONTD: 0.9 % sodium chloride infusion    ketorolac (TORADOL) injection 15 mg    calcium gluconate 10 % injection 1,000 mg    calcium carbonate (TUMS) chewable tablet 500 mg    potassium bicarb-citric acid (EFFER-K) effervescent tablet 40 mEq    lactated ringers bolus 2,000 mL    ondansetron (ZOFRAN) injection 4 mg    pantoprazole (PROTONIX) 40 mg in sodium chloride (PF) 0.9 % 10 mL injection    piperacillin-tazobactam (ZOSYN) 4,500 mg in sodium chloride 0.9 % 100 mL IVPB (mini-bag)     Order Specific Question:   Antimicrobial Indications     Answer:   Sepsis of Unknown Etiology     Order Specific Question:   Sepsis duration of therapy     Answer:   7 days    vancomycin (VANCOCIN) 2000 mg in 0.9% sodium chloride 500 mL IVPB     Order Specific Question:   Antimicrobial Indications     Answer:   Sepsis of Unknown Etiology     Order Specific Question:   Sepsis duration of therapy     Answer:   7 days          Medication List        ASK your doctor about these medications      amLODIPine 10 MG tablet  Commonly known as: NORVASC  TAKE ONE TABLET BY MOUTH DAILY     aspirin 81 MG EC tablet     hydroCHLOROthiazide 12.5 MG capsule  TAKE 1 CAPSULE BY MOUTH DAILY FOR HIGH BLOOD PRESSURE     ibuprofen 800 MG tablet  Commonly known as: ADVIL;MOTRIN  Take 1 tablet by mouth every 8 hours as needed for Pain              Follow-ups:  No follow-up provider specified.          Gabino Oliver PA-C  02/23/24 2012

## 2024-02-24 LAB
ALBUMIN SERPL-MCNC: 3.4 G/DL (ref 3.4–5)
ALBUMIN/GLOB SERPL: 0.8 (ref 0.8–1.7)
ALP SERPL-CCNC: 85 U/L (ref 45–117)
ALT SERPL-CCNC: 18 U/L (ref 13–56)
ANION GAP SERPL CALC-SCNC: 4 MMOL/L (ref 3–18)
AST SERPL-CCNC: 13 U/L (ref 10–38)
BASOPHILS # BLD: 0 K/UL (ref 0–0.1)
BASOPHILS NFR BLD: 0 % (ref 0–2)
BILIRUB SERPL-MCNC: 0.8 MG/DL (ref 0.2–1)
BUN SERPL-MCNC: 11 MG/DL (ref 7–18)
BUN/CREAT SERPL: 14 (ref 12–20)
CALCIUM SERPL-MCNC: 8.7 MG/DL (ref 8.5–10.1)
CHLORIDE SERPL-SCNC: 111 MMOL/L (ref 100–111)
CO2 SERPL-SCNC: 26 MMOL/L (ref 21–32)
CREAT SERPL-MCNC: 0.78 MG/DL (ref 0.6–1.3)
DIFFERENTIAL METHOD BLD: ABNORMAL
EKG ATRIAL RATE: 130 BPM
EKG DIAGNOSIS: NORMAL
EKG P AXIS: 68 DEGREES
EKG P-R INTERVAL: 110 MS
EKG Q-T INTERVAL: 318 MS
EKG QRS DURATION: 74 MS
EKG QTC CALCULATION (BAZETT): 467 MS
EKG R AXIS: 76 DEGREES
EKG T AXIS: 66 DEGREES
EKG VENTRICULAR RATE: 130 BPM
EOSINOPHIL # BLD: 0 K/UL (ref 0–0.4)
EOSINOPHIL NFR BLD: 0 % (ref 0–5)
ERYTHROCYTE [DISTWIDTH] IN BLOOD BY AUTOMATED COUNT: 14.6 % (ref 11.6–14.5)
GLOBULIN SER CALC-MCNC: 4.2 G/DL (ref 2–4)
GLUCOSE SERPL-MCNC: 120 MG/DL (ref 74–99)
HCT VFR BLD AUTO: 35.8 % (ref 35–45)
HGB BLD-MCNC: 12.2 G/DL (ref 12–16)
IMM GRANULOCYTES # BLD AUTO: 0.1 K/UL (ref 0–0.04)
IMM GRANULOCYTES NFR BLD AUTO: 1 % (ref 0–0.5)
LACTATE SERPL-SCNC: 1 MMOL/L (ref 0.4–2)
LYMPHOCYTES # BLD: 2.1 K/UL (ref 0.9–3.6)
LYMPHOCYTES NFR BLD: 14 % (ref 21–52)
MCH RBC QN AUTO: 31.4 PG (ref 24–34)
MCHC RBC AUTO-ENTMCNC: 34.1 G/DL (ref 31–37)
MCV RBC AUTO: 92.3 FL (ref 78–100)
MONOCYTES # BLD: 1.6 K/UL (ref 0.05–1.2)
MONOCYTES NFR BLD: 11 % (ref 3–10)
NEUTS SEG # BLD: 11.5 K/UL (ref 1.8–8)
NEUTS SEG NFR BLD: 75 % (ref 40–73)
NRBC # BLD: 0 K/UL (ref 0–0.01)
NRBC BLD-RTO: 0 PER 100 WBC
PLATELET # BLD AUTO: 219 K/UL (ref 135–420)
PMV BLD AUTO: 9.9 FL (ref 9.2–11.8)
POTASSIUM SERPL-SCNC: 3 MMOL/L (ref 3.5–5.5)
PROT SERPL-MCNC: 7.6 G/DL (ref 6.4–8.2)
RBC # BLD AUTO: 3.88 M/UL (ref 4.2–5.3)
SODIUM SERPL-SCNC: 141 MMOL/L (ref 136–145)
WBC # BLD AUTO: 15.4 K/UL (ref 4.6–13.2)

## 2024-02-24 PROCEDURE — 80053 COMPREHEN METABOLIC PANEL: CPT

## 2024-02-24 PROCEDURE — 6360000002 HC RX W HCPCS: Performed by: EMERGENCY MEDICINE

## 2024-02-24 PROCEDURE — 96376 TX/PRO/DX INJ SAME DRUG ADON: CPT

## 2024-02-24 PROCEDURE — 93010 ELECTROCARDIOGRAM REPORT: CPT | Performed by: INTERNAL MEDICINE

## 2024-02-24 PROCEDURE — 85025 COMPLETE CBC W/AUTO DIFF WBC: CPT

## 2024-02-24 PROCEDURE — 83605 ASSAY OF LACTIC ACID: CPT

## 2024-02-24 PROCEDURE — 6370000000 HC RX 637 (ALT 250 FOR IP): Performed by: EMERGENCY MEDICINE

## 2024-02-24 RX ORDER — POTASSIUM CHLORIDE 20 MEQ/1
20 TABLET, EXTENDED RELEASE ORAL 2 TIMES DAILY
Qty: 14 TABLET | Refills: 0 | Status: SHIPPED | OUTPATIENT
Start: 2024-02-24 | End: 2024-03-02

## 2024-02-24 RX ORDER — ONDANSETRON 2 MG/ML
4 INJECTION INTRAMUSCULAR; INTRAVENOUS
Status: COMPLETED | OUTPATIENT
Start: 2024-02-24 | End: 2024-02-24

## 2024-02-24 RX ORDER — ONDANSETRON 4 MG/1
4 TABLET, ORALLY DISINTEGRATING ORAL 3 TIMES DAILY PRN
Qty: 21 TABLET | Refills: 0 | Status: SHIPPED | OUTPATIENT
Start: 2024-02-24

## 2024-02-24 RX ORDER — DICYCLOMINE HCL 20 MG
20 TABLET ORAL 3 TIMES DAILY PRN
Qty: 20 TABLET | Refills: 0 | Status: SHIPPED | OUTPATIENT
Start: 2024-02-24

## 2024-02-24 RX ORDER — POTASSIUM CHLORIDE 20 MEQ/1
40 TABLET, EXTENDED RELEASE ORAL
Status: COMPLETED | OUTPATIENT
Start: 2024-02-24 | End: 2024-02-24

## 2024-02-24 RX ADMIN — POTASSIUM CHLORIDE 40 MEQ: 1500 TABLET, EXTENDED RELEASE ORAL at 03:49

## 2024-02-24 RX ADMIN — ONDANSETRON 4 MG: 2 INJECTION INTRAMUSCULAR; INTRAVENOUS at 03:49

## 2024-02-24 NOTE — ED NOTES
Pt given discharge instructions. Pt verbalized understanding of discharge instructions. Pt wheeled to front and picked up by significant other.

## 2024-02-25 LAB
BACTERIA SPEC CULT: NORMAL
BACTERIA SPEC CULT: NORMAL
SERVICE CMNT-IMP: NORMAL
SERVICE CMNT-IMP: NORMAL

## 2024-03-06 ENCOUNTER — OFFICE VISIT (OUTPATIENT)
Age: 49
End: 2024-03-06
Payer: MEDICAID

## 2024-03-06 VITALS — HEIGHT: 65 IN | BODY MASS INDEX: 28.67 KG/M2

## 2024-03-06 DIAGNOSIS — M67.01 CONTRACTURE OF RIGHT ACHILLES TENDON: Primary | ICD-10-CM

## 2024-03-06 PROCEDURE — 99214 OFFICE O/P EST MOD 30 MIN: CPT | Performed by: ORTHOPAEDIC SURGERY

## 2024-03-06 RX ORDER — LIDOCAINE 50 MG/G
1 PATCH TOPICAL DAILY
Qty: 30 PATCH | Refills: 0 | Status: SHIPPED | OUTPATIENT
Start: 2024-03-06 | End: 2024-04-05

## 2024-03-06 NOTE — PROGRESS NOTES
or treatment significantly limited by social determinants of health diagnosis or treatment significantly limited by social determinants of health: Smoker, alcohol consumption, noncompliance, incarceration, limited access to care, medical literacy, environmental conditions, quality of housing, Food insecurity, Housing instability, Homelessness, transportation issues, Financial insecurity, Material hardship, Employment status hardship, Education, [place] status, Stress, Social connection, Health insurance coverage status issues, Health literacy, Elder abuse intimate partner violence, Medical cost burden.        I have  //reviewing the previous notes, independently interpreting results reviewing diagnostic studies [Advanced  Imaging, Diagnostic test results (x-rays)] and had a direct face to face with the patient discussing the diagnosis and importance of compliance with the treatment and plan.  The treatment plan is listed in the above plan section of this Office encounter. I answered all of her questions, as well as documenting patient care coordination for this individual on the day of the visit.      Disclaimer:     Sections of this note are dictated using utilizing voice recognition software, which may have resulted in some phonetic based errors in grammar and contents. Even though attempts were made to correct all the mistakes, some may have been missed, and remained in the body of the document. If questions arise, please contact our department.     An electronic signature was used to authenticate this note.    Eva Stoneum may have a reminder for a \"due or due soon\" health maintenance. I have asked that she contact her primary care provider for follow-up on this health maintenance.         Raf Marion MD  3/6/2024  1:10 PM

## 2024-03-12 ENCOUNTER — HOSPITAL ENCOUNTER (OUTPATIENT)
Facility: HOSPITAL | Age: 49
Setting detail: RECURRING SERIES
Discharge: HOME | End: 2024-03-15
Payer: MEDICAID

## 2024-03-12 PROCEDURE — 97110 THERAPEUTIC EXERCISES: CPT

## 2024-03-12 PROCEDURE — 97112 NEUROMUSCULAR REEDUCATION: CPT

## 2024-03-12 PROCEDURE — 97116 GAIT TRAINING THERAPY: CPT

## 2024-03-12 NOTE — PROGRESS NOTES
PHYSICAL / OCCUPATIONAL THERAPY - DAILY TREATMENT NOTE    Patient Name: Eva Alan Galdino    Date: 3/12/2024    : 1975  Insurance: Payor: [facility] MEDICAID / Plan: MARILYN [facility] HEALTHKEEPERS PLUS / Product Type: *No Product type* /      Patient  verified yes   Visit #   Current / Total 5 18   Time   In / Out 717 800   Pain   In / Out 10/10 \"Painful\"   Subjective Functional Status/Changes: Pt reports still having high pain with weight bearing. She states that her doctor is trying to get her into pain management. First office took too long so trying another.      TREATMENT AREA =  Right ankle pain [M25.571]  Abnormality of gait [R26.9]    OBJECTIVE      Progressing                Left Right Right 1/3/24 Right 24 Right    Dorsiflexion 4 To neutral To neutral -4 To neutral   Plantarflexion 20 10 11 4 15   Inversion 12 4 14 23 10   Eversion 22 9 9 26 (pain) 10     Therapeutic Procedures:    Tx Min Billable or 1:1 Min (if diff from Tx Min) Procedure, Rationale, Specifics   13  75076 Gait Training (timed):    50+50 feet with SPC (assistive device) over even surfaces with supervision to standby level of assist. Cuing for heel/toe pattern and looking upright.  To improve safety and dynamic movement with household/community ambulation.  (see flow sheet as applicable)     Details if applicable:  ambulation with SPC and ambulation in parallel bars with cuing for increased step length and heel to toe pattern especially on right      10  30698 Therapeutic Exercise (timed):  increase ROM, strength, coordination, balance, and proprioception to improve patient's ability to progress to PLOF and address remaining functional goals. (see flow sheet as applicable)     Details if applicable:       20  36170 Neuromuscular Re-Education (timed):  improve balance, coordination, kinesthetic sense, posture, core stability and proprioception to improve patient's ability to develop conscious control of individual muscles and

## 2024-03-12 NOTE — THERAPY RECERTIFICATION
FELIPA SANTOYO [facility] - [facility]  [address] [place] [address] - Ph: [phone]   Fx: [phone]  PHYSICAL THERAPY PROGRESS NOTE  [x] Progress Note  [] Discharge Summary    Patient Name: Eva Ahmadi : 1975   Treatment/Medical Diagnosis: Right ankle pain [M25.571]  Abnormality of gait [R26.9]   Referral Source: Raf Marion MD     Date of Initial Visit:  Attended Visits: 15 Missed Visits: 2       Comorbidities: Depression, HTN, PVD, history of CVA and multiple TIAs, lymphedema   Prior Level of Function:    lives with son in 2 story home; working as cook at [employer], [place] [employer]       SUMMARY OF TREATMENT  Patient making fair progress towards goals at this time. She continues to report increased pain (10/10 at max) with prolonged standing/ambulation without the boot but has been trying to not wear it at all. She entered therapy today on the knee scooter and able to complete gait training and standing exercises but required seated rest breaks due to her pain. Pt is making slow progress in her strength and range of motion goals in PF and DF. Her DF has increased to 0 degrees from last assessment and strength to 4-/5.  Her inversion and eversion ROM and her FOTO score have regressed today but she reports very high pain this date at 10/10 which could be a contributing factor. She also has not been to PT since the  but states she had continued her exercises at home. She has also been able to ambulate at least 200 feet with no boot and SPC progressing goal 5. She reports that she feels as though physical therapy is helping her improve and reports about a 50% improvement since IE. She wants to continue physical therapy for her remaining approved visits.       Though she had some regression this date, considering she has not had PT for 3 weeks and her high pain, she is still progressing and would benefit from continued

## 2024-03-14 ENCOUNTER — TELEPHONE (OUTPATIENT)
Age: 49
End: 2024-03-14

## 2024-03-15 ENCOUNTER — HOSPITAL ENCOUNTER (OUTPATIENT)
Facility: HOSPITAL | Age: 49
Setting detail: RECURRING SERIES
End: 2024-03-15
Payer: MEDICAID

## 2024-03-15 NOTE — TELEPHONE ENCOUNTER
Tried to call patient, \"the subscriber you have dialed is not in service\".  If patient calls back please find out why the physical therapist is requesting an x-ray of the leg.

## 2024-03-15 NOTE — PROGRESS NOTES
PHYSICAL / OCCUPATIONAL THERAPY - DAILY TREATMENT NOTE    Patient Name: Eva Alan Galdino    Date: 3/15/2024    : 1975  Insurance: Payor: [facility] MEDICAID / Plan: MARILYN [facility] HEALTHKEEPERS PLUS / Product Type: *No Product type* /      Patient  verified {YES/NO:32650}     Visit #   Current / Total 1 18   Time   In / Out *** ***   Pain   In / Out *** ***   Subjective Functional Status/Changes: ***     TREATMENT AREA =  Right ankle pain [M25.571]  Abnormality of gait [R26.9]    OBJECTIVE    {[facility]:51981}     Therapeutic Procedures:    Tx Min Billable or 1:1 Min (if diff from Tx Min) Procedure, Rationale, Specifics     {[facility] Ther Procedures:89423}     Details if applicable:         {[facility] Ther Procedures:25773}     Details if applicable:       {[facility] Ther Procedures:87482}     Details if applicable:       {[facility] Ther Procedures:81047}     Details if applicable:       {[facility] Ther Procedures:07030}     Details if applicable:       [facility] Totals Reminder: bill using total billable min of TIMED therapeutic procedures (example: do not include dry needle or estim unattended, both untimed codes, in totals to left)  8-22 min = 1 unit; 23-37 min = 2 units; 38-52 min = 3 units; 53-67 min = 4 units; 68-82 min = 5 units   Total Total     [x]  Patient Education billed concurrently with other procedures   [x] Review HEP    [] Progressed/Changed HEP, detail:    [] Other detail:       Objective Information/Functional Measures/Assessment    ***    Patient will continue to benefit from skilled PT / OT services to {[facility]:61979} to address functional deficits and attain remaining goals.    Progress toward goals / Updated goals:  []  See Progress Note/Recertification     1. Patient will improve FOTO score to at least 52/100 points in order to demonstrate functional improvement.              Status at last progress note:       2. Patient will report no greater than

## 2024-03-19 ENCOUNTER — HOSPITAL ENCOUNTER (OUTPATIENT)
Facility: HOSPITAL | Age: 49
Setting detail: RECURRING SERIES
Discharge: HOME | End: 2024-03-22
Payer: MEDICAID

## 2024-03-19 PROCEDURE — 97112 NEUROMUSCULAR REEDUCATION: CPT

## 2024-03-19 PROCEDURE — 97110 THERAPEUTIC EXERCISES: CPT

## 2024-03-19 PROCEDURE — 97530 THERAPEUTIC ACTIVITIES: CPT

## 2024-03-19 NOTE — PROGRESS NOTES
PHYSICAL / OCCUPATIONAL THERAPY - DAILY TREATMENT NOTE    Patient Name: Eva Alan Galdino    Date: 3/19/2024    : 1975  Insurance: Payor: [facility] MEDICAID / Plan: MARILYN [facility] HEALTHKEEPERS PLUS / Product Type: *No Product type* /      Patient  verified Yes     Visit #   Current / Total 1 18   Time   In / Out 718 756   Pain   In / Out 7/10 7/10   Subjective Functional Status/Changes: Pt stated that she has her compression sock and sleeve on today to help with the pain. Stated that the bone pain is worse in the lower leg     TREATMENT AREA =  Right ankle pain [M25.571]  Abnormality of gait [R26.9]    OBJECTIVE         Therapeutic Procedures:    Tx Min Billable or 1:1 Min (if diff from Tx Min) Procedure, Rationale, Specifics   18  95521 Therapeutic Exercise (timed):  increase ROM, strength, coordination, balance, and proprioception to improve patient's ability to progress to PLOF and address remaining functional goals. (see flow sheet as applicable)     Details if applicable:       10  79897 Neuromuscular Re-Education (timed):  improve balance, coordination, kinesthetic sense, posture, core stability and proprioception to improve patient's ability to develop conscious control of individual muscles and awareness of position of extremities in order to progress to PLOF and address remaining functional goals. (see flow sheet as applicable)     Details if applicable:     10  58972 Therapeutic Activity (timed):  use of dynamic activities replicating functional movements to increase ROM, strength, coordination, balance, and proprioception in order to improve patient's ability to progress to PLOF and address remaining functional goals.  (see flow sheet as applicable)     Details if applicable:     38  [facility] Totals Reminder: bill using total billable min of TIMED therapeutic procedures (example: do not include dry needle or estim unattended, both untimed codes, in totals to left)  8-22 min = 1 unit; 23-37 min =

## 2024-03-21 ENCOUNTER — HOSPITAL ENCOUNTER (OUTPATIENT)
Facility: HOSPITAL | Age: 49
Setting detail: RECURRING SERIES
Discharge: HOME | End: 2024-03-24
Payer: MEDICAID

## 2024-03-21 PROCEDURE — 97530 THERAPEUTIC ACTIVITIES: CPT

## 2024-03-21 PROCEDURE — 97016 VASOPNEUMATIC DEVICE THERAPY: CPT

## 2024-03-21 PROCEDURE — 97110 THERAPEUTIC EXERCISES: CPT

## 2024-03-21 NOTE — PROGRESS NOTES
200 feet with cane, no boot     Next PN/ RC due 4/10/24  Auth due (visit number/ date) 18 visits, 5/24/24    PLAN  - Continue Plan of Care  - Upgrade activities as tolerated    Justa Mustafa PTA    3/21/2024    10:41 AM    Future Appointments   Date Time Provider Department Center   3/27/2024  8:00 AM [facility] [address] [facility]   3/28/2024  8:00 AM Gayla Walls, PTA [facility]   4/1/2024  9:20 AM Gayla Walls, PTA [facility]   4/3/2024  8:40 AM Gayla Walls, PTA [facility]   4/9/2024  8:00 AM Gayla Walls, PTA [facility]   4/11/2024  8:00 AM Gayla Walls, PTA [facility]   4/16/2024  8:00 AM Gayla Walls, PTA [facility]   4/17/2024 10:45 AM Raf Marion MD [facility]   4/18/2024  8:00 AM [facility] PT [address] [facility]   4/23/2024  9:20 AM Cristopher Nice, PT [facility]   4/25/2024  8:00 AM Gayla Walls, PTA [facility]   4/30/2024  8:00 AM Gayla Walls, PTA [facility]   5/2/2024  8:00 AM Gayla Walls, PTA [facility]   6/19/2024  9:30 AM Diya Vazquez, APRN - NP [facility]

## 2024-03-27 ENCOUNTER — HOSPITAL ENCOUNTER (OUTPATIENT)
Facility: HOSPITAL | Age: 49
Setting detail: RECURRING SERIES
Discharge: HOME | End: 2024-03-30
Payer: MEDICAID

## 2024-03-27 PROCEDURE — 97530 THERAPEUTIC ACTIVITIES: CPT

## 2024-03-27 PROCEDURE — 97110 THERAPEUTIC EXERCISES: CPT

## 2024-03-27 NOTE — PROGRESS NOTES
PHYSICAL / OCCUPATIONAL THERAPY - DAILY TREATMENT NOTE    Patient Name: Eva Alan Galdino    Date: 3/27/2024    : 1975  Insurance: Payor: [facility] MEDICAID / Plan: MARILYN [facility] HEALTHKEEPERS PLUS / Product Type: *No Product type* /      Patient  verified Yes     Visit #   Current / Total 3 18   Time   In / Out 8:01 8:40   Pain   In / Out 7 7   Subjective Functional Status/Changes: Pt notes she has been using a compression sleeve, which she feels has helped reduce her pain.     TREATMENT AREA =  Right ankle pain [M25.571]  Abnormality of gait [R26.9]    OBJECTIVE    Therapeutic Procedures:    Tx Min Billable or 1:1 Min (if diff from Tx Min) Procedure, Rationale, Specifics   23  09242 Therapeutic Exercise (timed):  increase ROM, strength, coordination, balance, and proprioception to improve patient's ability to progress to PLOF and address remaining functional goals. (see flow sheet as applicable)     Details if applicable:       16  06422 Therapeutic Activity (timed):  use of dynamic activities replicating functional movements to increase ROM, strength, coordination, balance, and proprioception in order to improve patient's ability to progress to PLOF and address remaining functional goals.  (see flow sheet as applicable)     Details if applicable:     39  [facility] Totals Reminder: bill using total billable min of TIMED therapeutic procedures (example: do not include dry needle or estim unattended, both untimed codes, in totals to left)  8-22 min = 1 unit; 23-37 min = 2 units; 38-52 min = 3 units; 53-67 min = 4 units; 68-82 min = 5 units   Total Total     [x]  Patient Education billed concurrently with other procedures   [x] Review HEP    [] Progressed/Changed HEP, detail:    [] Other detail:       Objective Information/Functional Measures/Assessment    Consider adding mini squats and step ups NV to improve pt's functional status with ADLs and community navigation  Progressed pt's volume of repetitions to

## 2024-03-28 ENCOUNTER — HOSPITAL ENCOUNTER (OUTPATIENT)
Facility: HOSPITAL | Age: 49
Setting detail: RECURRING SERIES
Discharge: HOME | End: 2024-03-31
Payer: MEDICAID

## 2024-03-28 PROCEDURE — 97110 THERAPEUTIC EXERCISES: CPT

## 2024-03-28 PROCEDURE — 97112 NEUROMUSCULAR REEDUCATION: CPT

## 2024-03-28 PROCEDURE — 97530 THERAPEUTIC ACTIVITIES: CPT

## 2024-03-28 PROCEDURE — 97016 VASOPNEUMATIC DEVICE THERAPY: CPT

## 2024-03-28 NOTE — PROGRESS NOTES
PHYSICAL / OCCUPATIONAL THERAPY - DAILY TREATMENT NOTE    Patient Name: Eva Alan Galdino    Date: 3/28/2024    : 1975  Insurance: Payor: [facility] MEDICAID / Plan: MARILYN [facility] HEALTHKEEPERS PLUS / Product Type: *No Product type* /      Patient  verified Yes     Visit #   Current / Total 4 18   Time   In / Out 759 838   Pain   In / Out 9-10/10 9-10/10   Subjective Functional Status/Changes: Pt stated that just sitting in her van her shin started hurting real bad     TREATMENT AREA =  Right ankle pain [M25.571]  Abnormality of gait [R26.9]    OBJECTIVE         Therapeutic Procedures:    Tx Min Billable or 1:1 Min (if diff from Tx Min) Procedure, Rationale, Specifics   19  42264 Therapeutic Exercise (timed):  increase ROM, strength, coordination, balance, and proprioception to improve patient's ability to progress to PLOF and address remaining functional goals. (see flow sheet as applicable)     Details if applicable:       10  75697 Neuromuscular Re-Education (timed):  improve balance, coordination, kinesthetic sense, posture, core stability and proprioception to improve patient's ability to develop conscious control of individual muscles and awareness of position of extremities in order to progress to PLOF and address remaining functional goals. (see flow sheet as applicable)     Details if applicable:     10  09318 Therapeutic Activity (timed):  use of dynamic activities replicating functional movements to increase ROM, strength, coordination, balance, and proprioception in order to improve patient's ability to progress to PLOF and address remaining functional goals.  (see flow sheet as applicable)     Details if applicable:     39  [facility] Totals Reminder: bill using total billable min of TIMED therapeutic procedures (example: do not include dry needle or estim unattended, both untimed codes, in totals to left)  8-22 min = 1 unit; 23-37 min = 2 units; 38-52 min = 3 units; 53-67 min = 4 units; 68-82

## 2024-04-01 ENCOUNTER — HOSPITAL ENCOUNTER (OUTPATIENT)
Facility: HOSPITAL | Age: 49
Setting detail: RECURRING SERIES
Discharge: HOME | End: 2024-04-04
Payer: MEDICAID

## 2024-04-01 PROCEDURE — 97110 THERAPEUTIC EXERCISES: CPT

## 2024-04-01 PROCEDURE — 97112 NEUROMUSCULAR REEDUCATION: CPT

## 2024-04-01 NOTE — PROGRESS NOTES
PHYSICAL / OCCUPATIONAL THERAPY - DAILY TREATMENT NOTE    Patient Name: Eva Alan Galdino    Date: 2024    : 1975  Insurance: Payor: [facility] MEDICAID / Plan: MARILYN [facility] HEALTHKEEPERS PLUS / Product Type: *No Product type* /      Patient  verified Yes     Visit #   Current / Total 5 18   Time   In / Out 923 950   Pain   In / Out 7/10 7/10   Subjective Functional Status/Changes: Pt stated that she has all her stuff on and took some medication this morning     TREATMENT AREA =  Right ankle pain [M25.571]  Abnormality of gait [R26.9]    OBJECTIVE         Therapeutic Procedures:    Tx Min Billable or 1:1 Min (if diff from Tx Min) Procedure, Rationale, Specifics   17  54362 Therapeutic Exercise (timed):  increase ROM, strength, coordination, balance, and proprioception to improve patient's ability to progress to PLOF and address remaining functional goals. (see flow sheet as applicable)     Details if applicable:       10  74024 Neuromuscular Re-Education (timed):  improve balance, coordination, kinesthetic sense, posture, core stability and proprioception to improve patient's ability to develop conscious control of individual muscles and awareness of position of extremities in order to progress to PLOF and address remaining functional goals. (see flow sheet as applicable)     Details if applicable:     27  [facility] Totals Reminder: bill using total billable min of TIMED therapeutic procedures (example: do not include dry needle or estim unattended, both untimed codes, in totals to left)  8-22 min = 1 unit; 23-37 min = 2 units; 38-52 min = 3 units; 53-67 min = 4 units; 68-82 min = 5 units   Total Total     [x]  Patient Education billed concurrently with other procedures   [x] Review HEP    [] Progressed/Changed HEP, detail:    [] Other detail:       Objective Information/Functional Measures/Assessment  Pt arrived to therapy without scooter today  Pt was late to session  Added step ups  Had increased

## 2024-04-03 ENCOUNTER — HOSPITAL ENCOUNTER (OUTPATIENT)
Facility: HOSPITAL | Age: 49
Setting detail: RECURRING SERIES
Discharge: HOME | End: 2024-04-06
Payer: MEDICAID

## 2024-04-03 PROCEDURE — 97112 NEUROMUSCULAR REEDUCATION: CPT

## 2024-04-03 PROCEDURE — 97110 THERAPEUTIC EXERCISES: CPT

## 2024-04-03 NOTE — PROGRESS NOTES
PHYSICAL / OCCUPATIONAL THERAPY - DAILY TREATMENT NOTE    Patient Name: Eva Alan Galdino    Date: 4/3/2024    : 1975  Insurance: Payor: [facility] MEDICAID / Plan: MARILYN [facility] HEALTHKEEPERS PLUS / Product Type: *No Product type* /      Patient  verified Yes     Visit #   Current / Total 6 18   Time   In / Out 840 910   Pain   In / Out 8/10 8/10   Subjective Functional Status/Changes: Pt stated that she could be better     TREATMENT AREA =  Right ankle pain [M25.571]  Abnormality of gait [R26.9]    OBJECTIVE         Therapeutic Procedures:    Tx Min Billable or 1:1 Min (if diff from Tx Min) Procedure, Rationale, Specifics   20  95192 Therapeutic Exercise (timed):  increase ROM, strength, coordination, balance, and proprioception to improve patient's ability to progress to PLOF and address remaining functional goals. (see flow sheet as applicable)     Details if applicable:       10  41826 Neuromuscular Re-Education (timed):  improve balance, coordination, kinesthetic sense, posture, core stability and proprioception to improve patient's ability to develop conscious control of individual muscles and awareness of position of extremities in order to progress to PLOF and address remaining functional goals. (see flow sheet as applicable)     Details if applicable:     30  [facility] Totals Reminder: bill using total billable min of TIMED therapeutic procedures (example: do not include dry needle or estim unattended, both untimed codes, in totals to left)  8-22 min = 1 unit; 23-37 min = 2 units; 38-52 min = 3 units; 53-67 min = 4 units; 68-82 min = 5 units   Total Total     [x]  Patient Education billed concurrently with other procedures   [x] Review HEP    [] Progressed/Changed HEP, detail:    [] Other detail:       Objective Information/Functional Measures/Assessment  Poor tolerance with exercises  Does not perform static balance correctly and keeps lifting right foot off the floor  Added 2\" step to 1/2 stance

## 2024-04-09 ENCOUNTER — HOSPITAL ENCOUNTER (OUTPATIENT)
Facility: HOSPITAL | Age: 49
Setting detail: RECURRING SERIES
Discharge: HOME | End: 2024-04-12
Payer: MEDICAID

## 2024-04-09 PROCEDURE — 97112 NEUROMUSCULAR REEDUCATION: CPT

## 2024-04-09 PROCEDURE — 97110 THERAPEUTIC EXERCISES: CPT

## 2024-04-09 NOTE — PROGRESS NOTES
DF 4-/5        5. Patient will be able to ambulate at least 400 ft with no more than LRAD in order to navigate home and community with increased ease.              Status at last progress note:  can walk 200 feet with cane, no boot      Next PN/ RC due 4/10/24  Auth due (visit number/ date) 18 visits, 5/24/24    PLAN  - Continue Plan of Care  - Upgrade activities as tolerated    Gayla Walls PTA    4/9/2024    6:20 AM    Future Appointments   Date Time Provider Department Center   4/9/2024  8:00 AM Gayla Walls PTA [facility]   4/11/2024  8:00 AM Gayla Walls PTA [facility]   4/16/2024  8:00 AM Gayla Walls, PTA [facility]   4/17/2024 10:45 AM Raf Marion MD [facility] AMB   4/18/2024  8:00 AM [facility] PT [address] [facility]   4/23/2024  9:20 AM Cristopher Nice, PT [facility]   4/25/2024  8:00 AM Gayla Walls, PTA [facility]   4/30/2024  8:00 AM Gayla Walls, PTA [facility]   5/2/2024  8:00 AM Gayla Walls, PTA [facility]   6/19/2024  9:30 AM Diya Vazquez APRN - NP [facility] AM[facility]

## 2024-04-11 ENCOUNTER — HOSPITAL ENCOUNTER (OUTPATIENT)
Facility: HOSPITAL | Age: 49
Setting detail: RECURRING SERIES
Discharge: HOME | End: 2024-04-14
Payer: MEDICAID

## 2024-04-11 PROCEDURE — 97112 NEUROMUSCULAR REEDUCATION: CPT

## 2024-04-11 PROCEDURE — 97530 THERAPEUTIC ACTIVITIES: CPT

## 2024-04-11 PROCEDURE — 97110 THERAPEUTIC EXERCISES: CPT

## 2024-04-11 NOTE — PROGRESS NOTES
PHYSICAL / OCCUPATIONAL THERAPY - DAILY TREATMENT NOTE    Patient Name: Eva Alan Galdino    Date: 2024    : 1975  Insurance: Payor: [facility] MEDICAID / Plan: MARILYN [facility] HEALTHKEEPERS PLUS / Product Type: *No Product type* /      Patient  verified Yes     Visit #   Current / Total 8 18   Time   In / Out 800 838   Pain   In / Out 7/10 7/10   Subjective Functional Status/Changes: Pt stated that she wants to cont with therapy and not be put on hold until she sees the MD     TREATMENT AREA =  Right ankle pain [M25.571]  Abnormality of gait [R26.9]    OBJECTIVE         Therapeutic Procedures:    Tx Min Billable or 1:1 Min (if diff from Tx Min) Procedure, Rationale, Specifics   18  67982 Therapeutic Exercise (timed):  increase ROM, strength, coordination, balance, and proprioception to improve patient's ability to progress to PLOF and address remaining functional goals. (see flow sheet as applicable)     Details if applicable:       10  59715 Neuromuscular Re-Education (timed):  improve balance, coordination, kinesthetic sense, posture, core stability and proprioception to improve patient's ability to develop conscious control of individual muscles and awareness of position of extremities in order to progress to PLOF and address remaining functional goals. (see flow sheet as applicable)     Details if applicable:     10  54413 Therapeutic Activity (timed):  use of dynamic activities replicating functional movements to increase ROM, strength, coordination, balance, and proprioception in order to improve patient's ability to progress to PLOF and address remaining functional goals.  (see flow sheet as applicable)     Details if applicable:     38  [facility] Totals Reminder: bill using total billable min of TIMED therapeutic procedures (example: do not include dry needle or estim unattended, both untimed codes, in totals to left)  8-22 min = 1 unit; 23-37 min = 2 units; 38-52 min = 3 units; 53-67 min = 4

## 2024-04-11 NOTE — THERAPY RECERTIFICATION
to ambulate and perform functional tasks with increased ease.              Status at last progress note: PF 5/5, DF 4/5     5. Patient will be able to ambulate at least 400 ft with no more than LRAD in order to navigate home and community with increased ease.              Status at last progress note:  Pt reports ambulating at home with SBQC, but does use the knee scooter on occasion in the community when has increased pain.      Frequency / Duration:   Patient to be seen   2-3   times per week for   4    WEEKS    RECOMMENDATIONS  We would like to continue therapy for progress to goals stated above.  Continue per initial Plan of Care.    If you have any questions/comments please contact us directly.  Thank you for allowing us to assist in the care of your patient.    Gayla Walls, PTA       4/11/2024       8:53 AM

## 2024-04-16 ENCOUNTER — HOSPITAL ENCOUNTER (OUTPATIENT)
Facility: HOSPITAL | Age: 49
Setting detail: RECURRING SERIES
Discharge: HOME | End: 2024-04-19
Payer: MEDICAID

## 2024-04-16 PROCEDURE — 97530 THERAPEUTIC ACTIVITIES: CPT

## 2024-04-16 PROCEDURE — 97110 THERAPEUTIC EXERCISES: CPT

## 2024-04-16 PROCEDURE — 97112 NEUROMUSCULAR REEDUCATION: CPT

## 2024-04-16 NOTE — PROGRESS NOTES
PHYSICAL / OCCUPATIONAL THERAPY - DAILY TREATMENT NOTE    Patient Name: Eva Alan Galdino    Date: 2024    : 1975  Insurance: Payor: [facility] MEDICAID / Plan: MARILYN [facility] HEALTHKEEPERS PLUS / Product Type: *No Product type* /      Patient  verified Yes     Visit #   Current / Total 1 12   Time   In / Out 7:59 8:40   Pain   In / Out 7/10 8/10   Subjective Functional Status/Changes: Pt. Reports she is feeling about the same today. She reports she continues to have sensitivity with covers and water hitting her leg.      TREATMENT AREA =  Right ankle pain [M25.571]  Abnormality of gait [R26.9]    OBJECTIVE       Therapeutic Procedures:    Tx Min Billable or 1:1 Min (if diff from Tx Min) Procedure, Rationale, Specifics   11  34118 Therapeutic Exercise (timed):  increase ROM, strength, coordination, balance, and proprioception to improve patient's ability to progress to PLOF and address remaining functional goals. (see flow sheet as applicable)     Details if applicable:  see flow sheet     15  74573 Neuromuscular Re-Education (timed):  improve balance, coordination, kinesthetic sense, posture, core stability and proprioception to improve patient's ability to develop conscious control of individual muscles and awareness of position of extremities in order to progress to PLOF and address remaining functional goals. (see flow sheet as applicable)     Details if applicable:  laterality and mirror training, gentle TC distraction and grade 2 posterior TC mobs   15  92457 Therapeutic Activity (timed):  use of dynamic activities replicating functional movements to increase ROM, strength, coordination, balance, and proprioception in order to improve patient's ability to progress to PLOF and address remaining functional goals.  (see flow sheet as applicable)     Details if applicable:  pain education, education on gastroc stretch for HEP          Details if applicable:            Details if applicable:     41

## 2024-04-17 ENCOUNTER — TELEPHONE (OUTPATIENT)
Age: 49
End: 2024-04-17

## 2024-04-17 ENCOUNTER — OFFICE VISIT (OUTPATIENT)
Age: 49
End: 2024-04-17
Payer: MEDICAID

## 2024-04-17 DIAGNOSIS — Z78.9 HISTORY OF DOG BITE: ICD-10-CM

## 2024-04-17 DIAGNOSIS — M67.01 CONTRACTURE OF RIGHT ACHILLES TENDON: Primary | ICD-10-CM

## 2024-04-17 PROCEDURE — 99213 OFFICE O/P EST LOW 20 MIN: CPT | Performed by: ORTHOPAEDIC SURGERY

## 2024-04-17 RX ORDER — LIDOCAINE 50 MG/G
1 PATCH TOPICAL DAILY
Qty: 30 PATCH | Refills: 0 | Status: SHIPPED | OUTPATIENT
Start: 2024-04-17 | End: 2024-05-17

## 2024-04-17 NOTE — TELEPHONE ENCOUNTER
Patient called to des us know that [facility] states they cannot treat her. She is asking if there are other pain management offices that she could be referred to.    Please review and advise patient, [phone]

## 2024-04-17 NOTE — PROGRESS NOTES
AMBULATORY PROGRESS NOTE      Patient: Eva Ahmadi             MRN: [id number]     SSN: [id number] There is no height or weight on file to calculate BMI.  YOB: 1975     AGE: 49 y.o.       EX: female    PCP: Amita Seals NP-C       IMPRESSION //  DIAGNOSIS AND TREATMENT PLAN      Eva Ahmadi has a diagnosis of:      DIAGNOSES    1. Contracture of right Achilles tendon    2. History of dog bite          PLAN:    1. Write for right hinged AFO brace from [employer]  2. Provided tall CAM boot today   3. Prescribed lidocaine patches     RTO PRN    Orders Placed This Encounter    AFO Brace    Cam Boot    lidocaine (LIDODERM) 5 %     Sig: Place 1 patch onto the skin daily 12 hours on, 12 hours off.     Dispense:  30 patch     Refill:  0        There are no Patient Instructions on file for this visit.      Please follow up with your PCP for any health maintenance as recommended.         Eva Ahmadi  expresses understanding of the diagnosis, treatment plan, and all of their proposed questions were answered to their satisfaction. Patient education has been provided re the diagnoses.         HPI //  OBJECTIVE EXAMINATION      Eva Ahmadi IS A 49 y.o. female who is a/an  established patient, presenting to my outpatient office for evaluation of  the following chief complaint(s):     Chief Complaint   Patient presents with    Ankle Pain     Right ankle       Patient presents for a right ankle follow up.     She reports still having a lot of pain posteromedially. She has severe pain when pressure is applied to the ankle medially and laterally. Mentions that wearing the AS/AC brace was uncomfortable fo the ankle, she still uses the short CAM boot when mobile. She is limited in her PT exercise activities due to her pain. She is still waiting to hear back from Dr. Braun's pain management group.     Patient is employed at: [employer].    There were no vitals taken for

## 2024-04-18 ENCOUNTER — HOSPITAL ENCOUNTER (OUTPATIENT)
Facility: HOSPITAL | Age: 49
Setting detail: RECURRING SERIES
Discharge: HOME | End: 2024-04-21
Payer: MEDICAID

## 2024-04-18 PROCEDURE — 97112 NEUROMUSCULAR REEDUCATION: CPT

## 2024-04-18 PROCEDURE — 97530 THERAPEUTIC ACTIVITIES: CPT

## 2024-04-18 PROCEDURE — 97110 THERAPEUTIC EXERCISES: CPT

## 2024-04-18 NOTE — PROGRESS NOTES
PHYSICAL / OCCUPATIONAL THERAPY - DAILY TREATMENT NOTE    Patient Name: Eva Alan Galdino    Date: 2024    : 1975  Insurance: Payor: [facility] MEDICAID / Plan: MARILYN [facility] HEALTHKEEPERS PLUS / Product Type: *No Product type* /      Patient  verified Yes     Visit #   Current / Total 2 12   Time   In / Out 755 845   Pain   In / Out 6 4   Subjective Functional Status/Changes: Pt reports that she went to the doctor yesterday and got a new boot to cover all her wounds.      TREATMENT AREA =  Right ankle pain [M25.571]  Abnormality of gait [R26.9]    OBJECTIVE   Ice to right ankle for 5 minutes to reduce pain and swelling. Skin intact    Therapeutic Procedures:    Tx Min Billable or 1:1 Min (if diff from Tx Min) Procedure, Rationale, Specifics    15 10501 Therapeutic Exercise (timed):  increase ROM, strength, coordination, balance, and proprioception to improve patient's ability to progress to PLOF and address remaining functional goals. (see flow sheet as applicable)     Details if applicable:  see flow sheet      15 09471 Neuromuscular Re-Education (timed):  improve balance, coordination, kinesthetic sense, posture, core stability and proprioception to improve patient's ability to develop conscious control of individual muscles and awareness of position of extremities in order to progress to PLOF and address remaining functional goals. (see flow sheet as applicable)     Details if applicable:   mirror training, balance    15 70050 Therapeutic Activity (timed):  use of dynamic activities replicating functional movements to increase ROM, strength, coordination, balance, and proprioception in order to improve patient's ability to progress to PLOF and address remaining functional goals.  (see flow sheet as applicable)     Details if applicable:  step ups, 1/2 stance          Details if applicable:            Details if applicable:      45 [facility] Totals Reminder: bill using total billable min of TIMED

## 2024-04-23 ENCOUNTER — APPOINTMENT (OUTPATIENT)
Facility: HOSPITAL | Age: 49
End: 2024-04-23
Payer: MEDICAID

## 2024-04-24 ENCOUNTER — TELEPHONE (OUTPATIENT)
Age: 49
End: 2024-04-24

## 2024-04-24 NOTE — TELEPHONE ENCOUNTER
Patient called in and is requesting a call back states that, she need another referral sent for pain management because where the referral was sent to they only see patients for the back and she needs someone for the foot.        Please call and advise patient at  [phone]

## 2024-04-30 ENCOUNTER — HOSPITAL ENCOUNTER (OUTPATIENT)
Facility: HOSPITAL | Age: 49
Setting detail: RECURRING SERIES
Discharge: HOME | End: 2024-05-03
Payer: MEDICAID

## 2024-04-30 PROCEDURE — 97110 THERAPEUTIC EXERCISES: CPT

## 2024-04-30 PROCEDURE — 97112 NEUROMUSCULAR REEDUCATION: CPT

## 2024-04-30 PROCEDURE — 97530 THERAPEUTIC ACTIVITIES: CPT

## 2024-04-30 NOTE — PROGRESS NOTES
PHYSICAL / OCCUPATIONAL THERAPY - DAILY TREATMENT NOTE    Patient Name: Eva Alan Galdino    Date: 2024    : 1975  Insurance: Payor: [facility] MEDICAID / Plan: MARILYN [facility] HEALTHKEEPERS PLUS / Product Type: *No Product type* /      Patient  verified Yes     Visit #   Current / Total 3 12   Time   In / Out 800 838   Pain   In / Out 6/10 6/10   Subjective Functional Status/Changes: Pt stated that she is going to see new foot specialist soon     TREATMENT AREA =  Right ankle pain [M25.571]  Abnormality of gait [R26.9]    OBJECTIVE         Therapeutic Procedures:    Tx Min Billable or 1:1 Min (if diff from Tx Min) Procedure, Rationale, Specifics   15  68739 Therapeutic Exercise (timed):  increase ROM, strength, coordination, balance, and proprioception to improve patient's ability to progress to PLOF and address remaining functional goals. (see flow sheet as applicable)     Details if applicable:       15  46236 Neuromuscular Re-Education (timed):  improve balance, coordination, kinesthetic sense, posture, core stability and proprioception to improve patient's ability to develop conscious control of individual muscles and awareness of position of extremities in order to progress to PLOF and address remaining functional goals. (see flow sheet as applicable)     Details if applicable:     8  98355 Therapeutic Activity (timed):  use of dynamic activities replicating functional movements to increase ROM, strength, coordination, balance, and proprioception in order to improve patient's ability to progress to PLOF and address remaining functional goals.  (see flow sheet as applicable)     Details if applicable:     38  [facility] Totals Reminder: bill using total billable min of TIMED therapeutic procedures (example: do not include dry needle or estim unattended, both untimed codes, in totals to left)  8-22 min = 1 unit; 23-37 min = 2 units; 38-52 min = 3 units; 53-67 min = 4 units; 68-82 min = 5 units   Total

## 2024-05-02 ENCOUNTER — HOSPITAL ENCOUNTER (OUTPATIENT)
Facility: HOSPITAL | Age: 49
Setting detail: RECURRING SERIES
Discharge: HOME | End: 2024-05-05
Payer: MEDICAID

## 2024-05-02 ENCOUNTER — TELEPHONE (OUTPATIENT)
Age: 49
End: 2024-05-02

## 2024-05-02 DIAGNOSIS — M67.01 CONTRACTURE OF RIGHT ACHILLES TENDON: Primary | ICD-10-CM

## 2024-05-02 PROCEDURE — 97112 NEUROMUSCULAR REEDUCATION: CPT

## 2024-05-02 PROCEDURE — 97110 THERAPEUTIC EXERCISES: CPT

## 2024-05-02 NOTE — PROGRESS NOTES
PHYSICAL / OCCUPATIONAL THERAPY - DAILY TREATMENT NOTE    Patient Name: Eva Alan Galdino    Date: 2024    : 1975  Insurance: Payor: [facility] MEDICAID / Plan: MARILYN [facility] HEALTHKEEPERS PLUS / Product Type: *No Product type* /      Patient  verified Yes     Visit #   Current / Total 4 12   Time   In / Out 803 830   Pain   In / Out 6/10 6/10   Subjective Functional Status/Changes: Pt requested to be let out early today due to having to get to another appt by 900     TREATMENT AREA =  Right ankle pain [M25.571]  Abnormality of gait [R26.9]    OBJECTIVE         Therapeutic Procedures:    Tx Min Billable or 1:1 Min (if diff from Tx Min) Procedure, Rationale, Specifics   17  30648 Therapeutic Exercise (timed):  increase ROM, strength, coordination, balance, and proprioception to improve patient's ability to progress to PLOF and address remaining functional goals. (see flow sheet as applicable)     Details if applicable:       10  55041 Neuromuscular Re-Education (timed):  improve balance, coordination, kinesthetic sense, posture, core stability and proprioception to improve patient's ability to develop conscious control of individual muscles and awareness of position of extremities in order to progress to PLOF and address remaining functional goals. (see flow sheet as applicable)     Details if applicable:     27  [facility] Totals Reminder: bill using total billable min of TIMED therapeutic procedures (example: do not include dry needle or estim unattended, both untimed codes, in totals to left)  8-22 min = 1 unit; 23-37 min = 2 units; 38-52 min = 3 units; 53-67 min = 4 units; 68-82 min = 5 units   Total Total     [x]  Patient Education billed concurrently with other procedures   [x] Review HEP    [] Progressed/Changed HEP, detail:    [] Other detail:       Objective Information/Functional Measures/Assessment  Pt requested to leave 10 min early to get to another appt    Pt is making slow progress toward

## 2024-05-03 NOTE — TELEPHONE ENCOUNTER
PT order entered. Tried to notify patient, call was \"breaking up\" and hard to hear. Call disconnected. If patient calls back, please let patient know that she needs to continue PT.

## 2024-05-07 ENCOUNTER — HOSPITAL ENCOUNTER (OUTPATIENT)
Facility: HOSPITAL | Age: 49
Setting detail: RECURRING SERIES
Discharge: HOME | End: 2024-05-10
Payer: MEDICAID

## 2024-05-07 PROCEDURE — 97530 THERAPEUTIC ACTIVITIES: CPT

## 2024-05-07 PROCEDURE — 97112 NEUROMUSCULAR REEDUCATION: CPT

## 2024-05-07 PROCEDURE — 97110 THERAPEUTIC EXERCISES: CPT

## 2024-05-07 NOTE — PROGRESS NOTES
PHYSICAL / OCCUPATIONAL THERAPY - DAILY TREATMENT NOTE    Patient Name: Eva Alan Galdino    Date: 2024    : 1975  Insurance: Payor: [facility] MEDICAID / Plan: MARILYN [facility] HEALTHKEEPERS PLUS / Product Type: *No Product type* /      Patient  verified Yes     Visit #   Current / Total 5 12   Time   In / Out 718 756   Pain   In / Out 6/10 7/10   Subjective Functional Status/Changes: Pt stated that she got fitted for her brace and is waiting to get insurance approval     TREATMENT AREA =  Right ankle pain [M25.571]  Abnormality of gait [R26.9]    OBJECTIVE         Therapeutic Procedures:    Tx Min Billable or 1:1 Min (if diff from Tx Min) Procedure, Rationale, Specifics   18  18436 Therapeutic Exercise (timed):  increase ROM, strength, coordination, balance, and proprioception to improve patient's ability to progress to PLOF and address remaining functional goals. (see flow sheet as applicable)     Details if applicable:       10  46010 Neuromuscular Re-Education (timed):  improve balance, coordination, kinesthetic sense, posture, core stability and proprioception to improve patient's ability to develop conscious control of individual muscles and awareness of position of extremities in order to progress to PLOF and address remaining functional goals. (see flow sheet as applicable)     Details if applicable:     10  74841 Therapeutic Activity (timed):  use of dynamic activities replicating functional movements to increase ROM, strength, coordination, balance, and proprioception in order to improve patient's ability to progress to PLOF and address remaining functional goals.  (see flow sheet as applicable)     Details if applicable:     38  [facility] Totals Reminder: bill using total billable min of TIMED therapeutic procedures (example: do not include dry needle or estim unattended, both untimed codes, in totals to left)  8-22 min = 1 unit; 23-37 min = 2 units; 38-52 min = 3 units; 53-67 min = 4 units;

## 2024-05-09 ENCOUNTER — HOSPITAL ENCOUNTER (OUTPATIENT)
Facility: HOSPITAL | Age: 49
Setting detail: RECURRING SERIES
Discharge: HOME | End: 2024-05-12
Payer: MEDICAID

## 2024-05-09 PROCEDURE — 97110 THERAPEUTIC EXERCISES: CPT

## 2024-05-09 PROCEDURE — 97112 NEUROMUSCULAR REEDUCATION: CPT

## 2024-05-09 PROCEDURE — 97530 THERAPEUTIC ACTIVITIES: CPT

## 2024-05-09 NOTE — PROGRESS NOTES
PHYSICAL / OCCUPATIONAL THERAPY - DAILY TREATMENT NOTE    Patient Name: Eva Alan Galdino    Date: 2024    : 1975  Insurance: Payor: [facility] MEDICAID / Plan: MARILYN [facility] HEALTHKEEPERS PLUS / Product Type: *No Product type* /      Patient  verified Yes     Visit #   Current / Total 6 12   Time   In / Out 10:40 11:18   Pain   In / Out 6/10 7/10   Subjective Functional Status/Changes: Pt still waiting on insurance approval for her brace. Her ankle is still swelling and painful, more lately. The new taller boot is better but she is not able to do a whole lot outside.     She is going to see a new ankle specialist Dr. Valdez, waiting on his call to schedule.     TREATMENT AREA =  Right ankle pain [M25.571]  Abnormality of gait [R26.9]    OBJECTIVE    Therapeutic Procedures:    Tx Min Billable or 1:1 Min (if diff from Tx Min) Procedure, Rationale, Specifics   18  86788 Therapeutic Exercise (timed):  increase ROM, strength, coordination, balance, and proprioception to improve patient's ability to progress to PLOF and address remaining functional goals. (see flow sheet as applicable)     Details if applicable:  see flowsheet     8  33707 Neuromuscular Re-Education (timed):  improve balance, coordination, kinesthetic sense, posture, core stability and proprioception to improve patient's ability to develop conscious control of individual muscles and awareness of position of extremities in order to progress to PLOF and address remaining functional goals. (see flow sheet as applicable)     Details if applicable:  balance     12  08008 Therapeutic Activity (timed):  use of dynamic activities replicating functional movements to increase ROM, strength, coordination, balance, and proprioception in order to improve patient's ability to progress to PLOF and address remaining functional goals.  (see flow sheet as applicable)     Details if applicable:  FOTO and re-assess goals     38  [facility] Totals Reminder: bill

## 2024-05-13 DIAGNOSIS — M62.561 ATROPHY OF MUSCLE OF RIGHT LOWER LEG: ICD-10-CM

## 2024-05-13 DIAGNOSIS — Z78.9 HISTORY OF DOG BITE: ICD-10-CM

## 2024-05-13 RX ORDER — MELOXICAM 15 MG/1
15 TABLET ORAL DAILY
Qty: 30 TABLET | Refills: 3 | Status: SHIPPED | OUTPATIENT
Start: 2024-05-13

## 2024-05-16 ENCOUNTER — HOSPITAL ENCOUNTER (OUTPATIENT)
Facility: HOSPITAL | Age: 49
Setting detail: RECURRING SERIES
Discharge: HOME | End: 2024-05-19
Payer: MEDICAID

## 2024-05-16 PROCEDURE — 97110 THERAPEUTIC EXERCISES: CPT

## 2024-05-16 PROCEDURE — 97530 THERAPEUTIC ACTIVITIES: CPT

## 2024-05-16 PROCEDURE — 97112 NEUROMUSCULAR REEDUCATION: CPT

## 2024-05-16 NOTE — PROGRESS NOTES
PHYSICAL / OCCUPATIONAL THERAPY - DAILY TREATMENT NOTE    Patient Name: Eva Alan Galdino    Date: 2024    : 1975  Insurance: Payor: [facility] MEDICAID / Plan: MARILYN [facility] HEALTHKEEPERS PLUS / Product Type: *No Product type* /      Patient  verified Yes     Visit #   Current / Total 1 12   Time   In / Out 8:40 9:20   Pain   In / Out 510 7/10   Subjective Functional Status/Changes: Pt. Reports she is feeling about the same today. She continues to have less pain with walking with boot on. She reports she has been working on her HEP.      TREATMENT AREA =  Right ankle pain [M25.571]  Abnormality of gait [R26.9]     OBJECTIVE    Therapeutic Procedures:    Tx Min Billable or 1:1 Min (if diff from Tx Min) Procedure, Rationale, Specifics   20  44047 Therapeutic Exercise (timed):  increase ROM, strength, coordination, balance, and proprioception to improve patient's ability to progress to PLOF and address remaining functional goals. (see flow sheet as applicable)     Details if applicable:  see flow sheet     8  26441 Therapeutic Activity (timed):  use of dynamic activities replicating functional movements to increase ROM, strength, coordination, balance, and proprioception in order to improve patient's ability to progress to PLOF and address remaining functional goals.  (see flow sheet as applicable)     Details if applicable:  pt. Education about pain and graded exposure    12  40815 Neuromuscular Re-Education (timed):  improve balance, coordination, kinesthetic sense, posture, core stability and proprioception to improve patient's ability to develop conscious control of individual muscles and awareness of position of extremities in order to progress to PLOF and address remaining functional goals. (see flow sheet as applicable)     Details if applicable:  standing balance activities, toe dissociation           Details if applicable:            Details if applicable:     40  [facility] Totals Reminder: bill

## 2024-05-23 RX ORDER — LIDOCAINE 50 MG/G
PATCH TOPICAL
Qty: 30 PATCH | Refills: 3 | Status: SHIPPED | OUTPATIENT
Start: 2024-05-23

## 2024-06-13 ENCOUNTER — HOSPITAL ENCOUNTER (OUTPATIENT)
Facility: HOSPITAL | Age: 49
Setting detail: RECURRING SERIES
Discharge: HOME | End: 2024-06-16
Payer: MEDICAID

## 2024-06-13 PROCEDURE — 97112 NEUROMUSCULAR REEDUCATION: CPT

## 2024-06-13 PROCEDURE — 97530 THERAPEUTIC ACTIVITIES: CPT

## 2024-06-13 PROCEDURE — 97110 THERAPEUTIC EXERCISES: CPT

## 2024-06-13 NOTE — PROGRESS NOTES
PHYSICAL / OCCUPATIONAL THERAPY - DAILY TREATMENT NOTE    Patient Name: Eva Alan Galdino    Date: 2024    : 1975  Insurance: Payor: [facility] MEDICAID / Plan: MARILYN [facility] HEALTHKEEPERS PLUS / Product Type: *No Product type* /      Patient  verified Yes     Visit #   Current / Total 2 12   Time   In / Out 9:20 9:58   Pain   In / Out 6/10 7/10   Subjective Functional Status/Changes:   Pt. Reports she got fitted for a new brace. Has been trying to walk on it more. Hasn't gotten brace yet. She reports she is still waiting on getting into pain management      TREATMENT AREA =  Right ankle pain [M25.571]  Abnormality of gait [R26.9]     OBJECTIVE  Therapeutic Procedures:    Tx Min Billable or 1:1 Min (if diff from Tx Min) Procedure, Rationale, Specifics   21  17925 Therapeutic Exercise (timed):  increase ROM, strength, coordination, balance, and proprioception to improve patient's ability to progress to PLOF and address remaining functional goals. (see flow sheet as applicable)     Details if applicable:  see flow sheet     8  23945 Neuromuscular Re-Education (timed):  improve balance, coordination, kinesthetic sense, posture, core stability and proprioception to improve patient's ability to develop conscious control of individual muscles and awareness of position of extremities in order to progress to PLOF and address remaining functional goals. (see flow sheet as applicable)     Details if applicable:  standing balance activities    9  91559 Therapeutic Activity (timed):  use of dynamic activities replicating functional movements to increase ROM, strength, coordination, balance, and proprioception in order to improve patient's ability to progress to PLOF and address remaining functional goals.  (see flow sheet as applicable)     Details if applicable:  goal re-assessment           Details if applicable:            Details if applicable:     38  [facility] Totals Reminder: bill using total billable min of

## 2024-06-13 NOTE — THERAPY RECERTIFICATION
[facility]  [address] [place] [address] - Ph: [phone]   Fx: [phone]  PHYSICAL THERAPY PROGRESS NOTE  [x] Progress Note  [] Discharge Summary    Patient Name: Eva Ahmadi : 1975   Treatment/Medical Diagnosis: Right ankle pain [M25.571]  Abnormality of gait [R26.9]   Referral Source: Raf Marion MD     Date of Initial Visit: 23 Attended Visits: 31 Missed Visits: 5       Comorbidities: Depression, HTN, PVD, history of CVA and multiple TIAs, lymphedema   Prior Level of Function: lives with son in 2 story home; working as cook at [employer]     SUMMARY OF TREATMENT  Pt. Is progressing slowly towards goals. She continues to demonstrate decreased right ankle mobility and strength and has been limited by her high pain levels. She had a gap in treatment with only 1 visit since her last progress note but has been working on her HEP. She continues to ambulate with her boot and SPC, but reports she was fitted for another brace to wear. FOTO score improved slightly to 31 points. Right ankle AROM PF improved to 54 degrees but DF remains limited to -10 degrees. She also continues to have decreased right ankle strength DF: 4-/5 PF: 2+/5. She has noticeable atrophy along right calf compared to left side. She also demonstrates poor right single leg balance at 2 seconds. Skilled PT is medically necessary in order to improve right ankle strength and mobility for increased ease of ambulation and improve independence at home.     CURRENT STATUS/Progress towards Goals:  Goal: Patient will improve FOTO score to at least 52/100 points in order to demonstrate functional improvement.   Status at evaluation/last progress note: 25 points    2.   Goal: Patient will report no greater than 5/10 pain to right ankle/LE in order to perform daily tasks with increased ease.   Status at evaluation/last progress note: 8/10    3.

## 2024-06-18 ENCOUNTER — HOSPITAL ENCOUNTER (OUTPATIENT)
Facility: HOSPITAL | Age: 49
Setting detail: RECURRING SERIES
Discharge: HOME | End: 2024-06-21
Payer: MEDICAID

## 2024-06-18 PROCEDURE — 97112 NEUROMUSCULAR REEDUCATION: CPT

## 2024-06-18 PROCEDURE — 97110 THERAPEUTIC EXERCISES: CPT

## 2024-06-18 PROCEDURE — 97530 THERAPEUTIC ACTIVITIES: CPT

## 2024-06-18 NOTE — PROGRESS NOTES
PHYSICAL / OCCUPATIONAL THERAPY - DAILY TREATMENT NOTE    Patient Name: Eva Alan Galdino    Date: 2024    : 1975  Insurance: Payor: [facility] MEDICAID / Plan: MARILYN [facility] HEALTHKEEPERS PLUS / Product Type: *No Product type* /      Patient  verified Yes     Visit #   Current / Total 1 12   Time   In / Out 1200 1238   Pain   In / Out 6/10 7/10   Subjective Functional Status/Changes: Pt stated that she still has 6/10 pain     TREATMENT AREA =  Right ankle pain [M25.571]  Abnormality of gait [R26.9]     OBJECTIVE         Therapeutic Procedures:    Tx Min Billable or 1:1 Min (if diff from Tx Min) Procedure, Rationale, Specifics   18  33576 Therapeutic Exercise (timed):  increase ROM, strength, coordination, balance, and proprioception to improve patient's ability to progress to PLOF and address remaining functional goals. (see flow sheet as applicable)     Details if applicable:       10  91137 Neuromuscular Re-Education (timed):  improve balance, coordination, kinesthetic sense, posture, core stability and proprioception to improve patient's ability to develop conscious control of individual muscles and awareness of position of extremities in order to progress to PLOF and address remaining functional goals. (see flow sheet as applicable)     Details if applicable:     10  70320 Therapeutic Activity (timed):  use of dynamic activities replicating functional movements to increase ROM, strength, coordination, balance, and proprioception in order to improve patient's ability to progress to PLOF and address remaining functional goals.  (see flow sheet as applicable)     Details if applicable:     38  [facility] Totals Reminder: bill using total billable min of TIMED therapeutic procedures (example: do not include dry needle or estim unattended, both untimed codes, in totals to left)  8-22 min = 1 unit; 23-37 min = 2 units; 38-52 min = 3 units; 53-67 min = 4 units; 68-82 min = 5 units   Total Total     [x]

## 2024-06-21 ENCOUNTER — HOSPITAL ENCOUNTER (OUTPATIENT)
Facility: HOSPITAL | Age: 49
Setting detail: RECURRING SERIES
End: 2024-06-21
Payer: MEDICAID

## 2024-06-21 NOTE — PROGRESS NOTES
PHYSICAL / OCCUPATIONAL THERAPY - DAILY TREATMENT NOTE    Patient Name: Eva Alan Galdino    Date: 2024    : 1975  Insurance: Payor: [facility] MEDICAID / Plan: MARILYN [facility] HEALTHKEEPERS PLUS / Product Type: *No Product type* /      Patient  verified {YES/NO:83786}     Visit #   Current / Total 2 12   Time   In / Out *** ***   Pain   In / Out *** ***   Subjective Functional Status/Changes: ***     TREATMENT AREA =  Right ankle pain [M25.571]  Abnormality of gait [R26.9]     OBJECTIVE    {[employer] [facility] [facility]:34418}     Therapeutic Procedures:    Tx Min Billable or 1:1 Min (if diff from Tx Min) Procedure, Rationale, Specifics     {[employer] Ther Procedures:70392}     Details if applicable:         {[facility] Procedures:13071}     Details if applicable:       {[facility] [facility] Procedures:83174}     Details if applicable:       {[facility] [facility] Procedures:68828}     Details if applicable:       {[employer] Ther Procedures:10318}     Details if applicable:       [facility] Totals Reminder: bill using total billable min of TIMED therapeutic procedures (example: do not include dry needle or estim unattended, both untimed codes, in totals to left)  8-22 min = 1 unit; 23-37 min = 2 units; 38-52 min = 3 units; 53-67 min = 4 units; 68-82 min = 5 units   Total Total     [x]  Patient Education billed concurrently with other procedures   [x] Review HEP    [] Progressed/Changed HEP, detail:    [] Other detail:       Objective Information/Functional Measures/Assessment    Right ankle PF MMT: ***    Patient will continue to benefit from skilled PT / OT services to {[employer] [facility]:68627} to address functional deficits and attain remaining goals.    Progress toward goals / Updated goals:  []  See Progress Note/Recertification    Goal: Patient will improve FOTO score to 52 points in order to demonstrate a significant improvement in function.   Status at evaluation/last progress note: 25 points     2.   Goal:

## 2024-06-28 ENCOUNTER — HOSPITAL ENCOUNTER (OUTPATIENT)
Facility: HOSPITAL | Age: 49
Setting detail: RECURRING SERIES
Discharge: HOME | End: 2024-07-01
Payer: MEDICAID

## 2024-06-28 PROCEDURE — 97110 THERAPEUTIC EXERCISES: CPT

## 2024-06-28 PROCEDURE — 97530 THERAPEUTIC ACTIVITIES: CPT

## 2024-06-28 PROCEDURE — 97112 NEUROMUSCULAR REEDUCATION: CPT

## 2024-06-28 NOTE — PROGRESS NOTES
(example: do not include dry needle or estim unattended, both untimed codes, in totals to left)  8-22 min = 1 unit; 23-37 min = 2 units; 38-52 min = 3 units; 53-67 min = 4 units; 68-82 min = 5 units   Total Total     [x]  Patient Education billed concurrently with other procedures   [x] Review HEP    [] Progressed/Changed HEP, detail:    [] Other detail:       Objective Information/Functional Measures/Assessment    Some improvement in mobility following self stick massage  She demonstrates improving right ankle PROM DF during heel slides  Poor toe dissociation   BP: 131/95    Assessment:  Pt. Is making limited progress towards goals. She continues to have hypersensitivity to pain and demonstrates decreased right ankle/foot strength. She also continues to demonstrate poor proprioception with poor balance with eyes closed exercises. She was educated on importance of HEP and ambulation without boot at home.     Patient will continue to benefit from skilled PT / OT services to modify and progress therapeutic interventions, analyze and address functional mobility deficits, analyze and address ROM deficits, analyze and address strength deficits, analyze and address soft tissue restrictions, analyze and cue for proper movement patterns, analyze and modify for postural abnormalities, and analyze and address imbalance/dizziness to address functional deficits and attain remaining goals.    Progress toward goals / Updated goals:  []  See Progress Note/Recertification    Goal: Patient will improve FOTO score to 52 points in order to demonstrate a significant improvement in function.   Status at evaluation/last progress note: 25 points     2.   Goal: Patient will report no greater than 5/10 pain to right ankle/LE in order to perform daily tasks with increased ease.   Status at evaluation/last progress note: 8/10     3.   Goal: Patient will improve right ankle DF AROM to 0 degrees in order to increase ease of ambulation.   Status

## 2024-07-01 ENCOUNTER — HOSPITAL ENCOUNTER (OUTPATIENT)
Facility: HOSPITAL | Age: 49
Setting detail: RECURRING SERIES
Discharge: HOME | End: 2024-07-04
Payer: MEDICAID

## 2024-07-01 PROCEDURE — 97110 THERAPEUTIC EXERCISES: CPT

## 2024-07-01 PROCEDURE — 97112 NEUROMUSCULAR REEDUCATION: CPT

## 2024-07-01 NOTE — PROGRESS NOTES
PHYSICAL / OCCUPATIONAL THERAPY - DAILY TREATMENT NOTE    Patient Name: Eva Alan Galdino    Date: 2024    : 1975  Insurance: Payor: [facility] MEDICAID / Plan: MARILYN [facility] HEALTHKEEPERS PLUS / Product Type: *No Product type* /      Patient  verified yes   Visit #   Current / Total 3 12   Time   In / Out 10:51A 11:24A   Pain   In / Out 7/10 7/10   Subjective Functional Status/Changes: Patient has a note the doctor recommended to limit the amount of weight she put on it. She picks up her custom brace. She was wondering about sleeve she should get; she spoke to someone last time but they need the make and model. She is with a new ankle doctor and they want her to wear the brace or boot until they say otherwise.  She still has not started pain management. The new foot doctor is trying her on different medications as she still has a lot of bone pain that she is told is nerve pain. She is trying to do more housework and more chores. She is just doing the band exercises at home. She wears shoes and uses her cane at home but does wear the boot if she does too much at home and then the doctor wants her to wear it outside of the home.     TREATMENT AREA =  Right ankle pain [M25.571]  Abnormality of gait [R26.9]     OBJECTIVE        Therapeutic Procedures:    Tx Min Billable or 1:1 Min (if diff from Tx Min) Procedure, Rationale, Specifics   19  82925 Therapeutic Exercise (timed):  increase ROM, strength, coordination, balance, and proprioception to improve patient's ability to progress to PLOF and address remaining functional goals. (see flow sheet as applicable)     Details if applicable:       14  84794 Neuromuscular Re-Education (timed):  improve balance, coordination, kinesthetic sense, posture, core stability and proprioception to improve patient's ability to develop conscious control of individual muscles and awareness of position of extremities in order to progress to PLOF and address remaining

## 2024-07-03 ENCOUNTER — HOSPITAL ENCOUNTER (OUTPATIENT)
Facility: HOSPITAL | Age: 49
Setting detail: RECURRING SERIES
Discharge: HOME | End: 2024-07-06
Payer: MEDICAID

## 2024-07-03 PROCEDURE — 97110 THERAPEUTIC EXERCISES: CPT

## 2024-07-03 PROCEDURE — 97112 NEUROMUSCULAR REEDUCATION: CPT

## 2024-07-03 PROCEDURE — 97530 THERAPEUTIC ACTIVITIES: CPT

## 2024-07-03 NOTE — PROGRESS NOTES
PHYSICAL / OCCUPATIONAL THERAPY - DAILY TREATMENT NOTE    Patient Name: Eva Aaln Galdino    Date: 7/3/2024    : 1975  Insurance: Payor: [facility] MEDICAID / Plan: MARILYN [facility] HEALTHKEEPERS PLUS / Product Type: *No Product type* /      Patient  verified Yes     Visit #   Current / Total 4 12   Time   In / Out 11:20 11:58   Pain   In / Out 6/10 6/10   Subjective Functional Status/Changes: Pt. Reports she is feeling about the same today. She has still been working on her exercises at home.      TREATMENT AREA =  Right ankle pain [M25.571]  Abnormality of gait [R26.9]     OBJECTIVE    Therapeutic Procedures:    Tx Min Billable or 1:1 Min (if diff from Tx Min) Procedure, Rationale, Specifics   20  53677 Therapeutic Exercise (timed):  increase ROM, strength, coordination, balance, and proprioception to improve patient's ability to progress to PLOF and address remaining functional goals. (see flow sheet as applicable)     Details if applicable:  see flow sheet     9  57139 Neuromuscular Re-Education (timed):  improve balance, coordination, kinesthetic sense, posture, core stability and proprioception to improve patient's ability to develop conscious control of individual muscles and awareness of position of extremities in order to progress to PLOF and address remaining functional goals. (see flow sheet as applicable)     Details if applicable:  standing balance, retro rocking   9  19432 Therapeutic Activity (timed):  use of dynamic activities replicating functional movements to increase ROM, strength, coordination, balance, and proprioception in order to improve patient's ability to progress to PLOF and address remaining functional goals.  (see flow sheet as applicable)     Details if applicable:  Goal re-assessments, ambulation with mirror fwd and back with verbal cues to improve weight shift to right side          Details if applicable:            Details if applicable:     38   [facility] Totals Reminder: bill

## 2024-07-08 ENCOUNTER — HOSPITAL ENCOUNTER (OUTPATIENT)
Facility: HOSPITAL | Age: 49
Setting detail: RECURRING SERIES
Discharge: HOME | End: 2024-07-11
Payer: MEDICAID

## 2024-07-08 PROCEDURE — 97112 NEUROMUSCULAR REEDUCATION: CPT

## 2024-07-08 PROCEDURE — 97530 THERAPEUTIC ACTIVITIES: CPT

## 2024-07-08 PROCEDURE — 97110 THERAPEUTIC EXERCISES: CPT

## 2024-07-08 NOTE — PROGRESS NOTES
PHYSICAL / OCCUPATIONAL THERAPY - DAILY TREATMENT NOTE    Patient Name: Eva Alan Galdino    Date: 2024    : 1975  Insurance: Payor: [facility] MEDICAID / Plan: MARILYN [facility] HEALTHKEEPERS PLUS / Product Type: *No Product type* /      Patient  verified Yes     Visit #   Current / Total 5 12   Time   In / Out 9:22 10:10   Pain   In / Out 7/10 8/10   Subjective Functional Status/Changes: Pt. Reports she is still having a lot of pain     TREATMENT AREA =  Right ankle pain [M25.571]  Abnormality of gait [R26.9]     OBJECTIVE    Modalities Rationale:     decrease pain to improve patient's ability to progress to PLOF and address remaining functional goals.     min [] Estim Unattended, type/location:                                      []  w/ice    []  w/heat    min [] Estim Attended, type/location:                                     []  w/US     []  w/ice    []  w/heat    []  TENS insruct      min []  Mechanical Traction: type/lbs                   []  pro   []  sup   []  int   []  cont    []  before manual    []  after manual    min []  Ultrasound, settings/location:     10 min  unbill [x]  Ice     []  Heat    location/position: Inclined table  Right ankle    min []  Paraffin,  details:     min []  Vasopneumatic Device, press/temp:     min []  Whirlpool / Fluido:    If using vaso (only need to measure limb vaso being performed on)      pre-treatment girth :       post-treatment girth :       measured at (landmark location) :      min []  Other:    Skin assessment post-treatment:   Intact      Therapeutic Procedures:    Tx Min Billable or 1:1 Min (if diff from Tx Min) Procedure, Rationale, Specifics   20  58055 Therapeutic Exercise (timed):  increase ROM, strength, coordination, balance, and proprioception to improve patient's ability to progress to PLOF and address remaining functional goals. (see flow sheet as applicable)     Details if applicable:  see flow sheet     10  56802 Neuromuscular

## 2024-07-10 ENCOUNTER — HOSPITAL ENCOUNTER (OUTPATIENT)
Facility: HOSPITAL | Age: 49
Setting detail: RECURRING SERIES
End: 2024-07-10
Payer: MEDICAID

## 2024-07-15 ENCOUNTER — HOSPITAL ENCOUNTER (OUTPATIENT)
Facility: HOSPITAL | Age: 49
Setting detail: RECURRING SERIES
Discharge: HOME | End: 2024-07-18
Payer: MEDICAID

## 2024-07-15 PROCEDURE — 97535 SELF CARE MNGMENT TRAINING: CPT

## 2024-07-15 PROCEDURE — 97110 THERAPEUTIC EXERCISES: CPT

## 2024-07-15 PROCEDURE — 97530 THERAPEUTIC ACTIVITIES: CPT

## 2024-07-15 NOTE — PROGRESS NOTES
PHYSICAL / OCCUPATIONAL THERAPY - DAILY TREATMENT NOTE    Patient Name: Eva Alan Galdino    Date: 7/15/2024    : 1975  Insurance: Payor: [facility] MEDICAID / Plan: MARILYN [facility] HEALTHKEEPERS PLUS / Product Type: *No Product type* /      Patient  verified Yes     Visit #   Current / Total 6 12   Time   In / Out 10:00 10:40   Pain   In / Out 7/10 7/10   Subjective Functional Status/Changes: Pt reports she should be going back to Dr. Velazquez in about two weeks. She is having a hard time getting scheduled with pain management. She states the cortisone shot she was given only lasted about a week. She feels her pain always worsens after too much activity. She tries to wear a shoe around the house but was told to wear the boot by the MD.      TREATMENT AREA =  Right ankle pain [M25.571]  Abnormality of gait [R26.9]     OBJECTIVE      Therapeutic Procedures:    Tx Min Billable or 1:1 Min (if diff from Tx Min) Procedure, Rationale, Specifics   10  85121 Therapeutic Exercise (timed):  increase ROM, strength, coordination, balance, and proprioception to improve patient's ability to progress to PLOF and address remaining functional goals. (see flow sheet as applicable)     Details if applicable:  see flow sheet; review of gastroc/soleus stretching     15  41295 Self Care/Home Management (timed):  improve patient knowledge and understanding of pain reducing techniques, positioning, posture/ergonomics, home safety, activity modification, diagnosis/prognosis, and physical therapy expectations, procedures and progression  to improve patient's ability to progress to PLOF and address remaining functional goals.  (see flow sheet as applicable)     Details if applicable:  heel lift in right shoe for progressive achilles tendon lengthening; education on AFO versus dynasplint    15  97796 Therapeutic Activity (timed):  use of dynamic activities replicating functional movements to increase ROM, strength, coordination,

## 2024-07-15 NOTE — THERAPY RECERTIFICATION
Patient will improve FOTO score to 52 points in order to demonstrate a significant improvement in function.   Status at evaluation/last progress note: 25 points  Current: 37/100    2.   Goal: Patient will report no greater than 5/10 pain to right ankle/LE in order to perform daily tasks with increased ease.   Status at evaluation/last progress note: 8/10  Current: 7/10 at worst; 6/10 at best    3.   Goal: Patient will improve right ankle DF AROM to 0 degrees in order to increase ease of ambulation.   Status at evaluation/last progress note: -10 degrees  Current: 8 degrees    4.   Goal: Patient will improve right ankle PF MMT to  4-/5 in order to increase ease of ambulation.   Status at evaluation/last progress note: 2+/5  Current: 2+/5    5.   Goal: Patient will improve right single leg balance to 10 seconds improve stability during ambulation.   Status at evaluation/last progress note: 2 seconds  Current: 4 seconds    Non-Medicare, can change goals, can adjust or add frequency duration, no signature required      New Goals to be achieved in __4_ WEEKS  Goal: Patient will improve FOTO score to 52 points in order to demonstrate a significant improvement in function.   Status at evaluation/last progress note: 37/100    2.   Goal: Patient will report no greater than 5/10 pain to right ankle/LE in order to perform daily tasks with increased ease.   Status at evaluation/last progress note: 7/10 at worst    3.   Goal: Patient will improve right ankle DF AROM to 15 degrees in order to increase ease of ambulation.   Status at evaluation/last progress note: 8 degrees    4.   Goal: Patient will improve right ankle PF MMT to  4-/5 in order to increase ease of ambulation.   Status at evaluation/last progress note: 2+/5    5.   Goal: Patient will improve right single leg balance to 10 seconds improve stability during ambulation.   Status at evaluation/last progress note: 4 seconds    Frequency / Duration:   Patient to be seen

## 2024-07-17 ENCOUNTER — HOSPITAL ENCOUNTER (OUTPATIENT)
Facility: HOSPITAL | Age: 49
Setting detail: RECURRING SERIES
End: 2024-07-17
Payer: MEDICAID

## 2024-07-22 ENCOUNTER — HOSPITAL ENCOUNTER (OUTPATIENT)
Facility: HOSPITAL | Age: 49
Setting detail: RECURRING SERIES
Discharge: HOME | End: 2024-07-25
Payer: MEDICAID

## 2024-07-22 PROCEDURE — 97110 THERAPEUTIC EXERCISES: CPT

## 2024-07-22 PROCEDURE — 97112 NEUROMUSCULAR REEDUCATION: CPT

## 2024-07-22 PROCEDURE — 97530 THERAPEUTIC ACTIVITIES: CPT

## 2024-07-22 NOTE — PROGRESS NOTES
PHYSICAL / OCCUPATIONAL THERAPY - DAILY TREATMENT NOTE    Patient Name: Eva Alan Galdino    Date: 2024    : 1975  Insurance: Payor: [facility] MEDICAID / Plan: MARILYN [facility] HEALTHKEEPERS PLUS / Product Type: *No Product type* /      Patient  verified Yes     Visit #   Current / Total 1 12   Time   In / Out 9:21 9:59   Pain   In / Out 7/10 7/10   Subjective Functional Status/Changes: Pt. Reports she is feeling about the same today. She reports the heel lift helps some but needs one on the other side.      TREATMENT AREA =  Right ankle pain [M25.571]  Abnormality of gait [R26.9]     OBJECTIVE    Therapeutic Procedures:    Tx Min Billable or 1:1 Min (if diff from Tx Min) Procedure, Rationale, Specifics   15  57670 Therapeutic Exercise (timed):  increase ROM, strength, coordination, balance, and proprioception to improve patient's ability to progress to PLOF and address remaining functional goals. (see flow sheet as applicable)     Details if applicable:  see flow sheet     15  15137 Neuromuscular Re-Education (timed):  improve balance, coordination, kinesthetic sense, posture, core stability and proprioception to improve patient's ability to develop conscious control of individual muscles and awareness of position of extremities in order to progress to PLOF and address remaining functional goals. (see flow sheet as applicable)     Details if applicable:  standing balance activities, slow heel to toe walking   8  42090 Therapeutic Activity (timed):  use of dynamic activities replicating functional movements to increase ROM, strength, coordination, balance, and proprioception in order to improve patient's ability to progress to PLOF and address remaining functional goals.  (see flow sheet as applicable)     Details if applicable:  mini lunges, 2x4 mini squats          Details if applicable:            Details if applicable:     38  [facility] Totals Reminder: bill using total billable min of TIMED

## 2024-07-24 ENCOUNTER — HOSPITAL ENCOUNTER (OUTPATIENT)
Facility: HOSPITAL | Age: 49
Setting detail: RECURRING SERIES
End: 2024-07-24
Payer: MEDICAID

## 2024-07-26 ENCOUNTER — HOSPITAL ENCOUNTER (EMERGENCY)
Facility: HOSPITAL | Age: 49
Discharge: HOME | End: 2024-07-26

## 2024-07-29 ENCOUNTER — HOSPITAL ENCOUNTER (EMERGENCY)
Facility: HOSPITAL | Age: 49
Discharge: HOME | End: 2024-07-29
Attending: EMERGENCY MEDICINE
Payer: MEDICAID

## 2024-07-29 ENCOUNTER — APPOINTMENT (OUTPATIENT)
Facility: HOSPITAL | Age: 49
End: 2024-07-29
Payer: MEDICAID

## 2024-07-29 ENCOUNTER — HOSPITAL ENCOUNTER (OUTPATIENT)
Facility: HOSPITAL | Age: 49
Setting detail: RECURRING SERIES
End: 2024-07-29
Payer: MEDICAID

## 2024-07-29 VITALS
WEIGHT: 180 LBS | BODY MASS INDEX: 29.99 KG/M2 | TEMPERATURE: 98.1 F | OXYGEN SATURATION: 100 % | SYSTOLIC BLOOD PRESSURE: 105 MMHG | HEIGHT: 65 IN | HEART RATE: 71 BPM | DIASTOLIC BLOOD PRESSURE: 79 MMHG | RESPIRATION RATE: 17 BRPM

## 2024-07-29 DIAGNOSIS — R20.2 PARESTHESIAS: Primary | ICD-10-CM

## 2024-07-29 LAB
ANION GAP SERPL CALC-SCNC: 2 MMOL/L (ref 3–18)
BASOPHILS # BLD: 0.1 K/UL (ref 0–0.1)
BASOPHILS NFR BLD: 0 % (ref 0–2)
BUN SERPL-MCNC: 11 MG/DL (ref 7–18)
BUN/CREAT SERPL: 19 (ref 12–20)
CALCIUM SERPL-MCNC: 8.7 MG/DL (ref 8.5–10.1)
CHLORIDE SERPL-SCNC: 110 MMOL/L (ref 100–111)
CO2 SERPL-SCNC: 27 MMOL/L (ref 21–32)
CREAT SERPL-MCNC: 0.58 MG/DL (ref 0.6–1.3)
DIFFERENTIAL METHOD BLD: ABNORMAL
EKG ATRIAL RATE: 67 BPM
EKG DIAGNOSIS: NORMAL
EKG P AXIS: 51 DEGREES
EKG P-R INTERVAL: 182 MS
EKG Q-T INTERVAL: 380 MS
EKG QRS DURATION: 74 MS
EKG QTC CALCULATION (BAZETT): 401 MS
EKG R AXIS: 32 DEGREES
EKG T AXIS: -14 DEGREES
EKG VENTRICULAR RATE: 67 BPM
EOSINOPHIL # BLD: 0.2 K/UL (ref 0–0.4)
EOSINOPHIL NFR BLD: 2 % (ref 0–5)
ERYTHROCYTE [DISTWIDTH] IN BLOOD BY AUTOMATED COUNT: 14.6 % (ref 11.6–14.5)
GLUCOSE BLD STRIP.AUTO-MCNC: 101 MG/DL (ref 70–110)
GLUCOSE SERPL-MCNC: 97 MG/DL (ref 74–99)
HCT VFR BLD AUTO: 36.8 % (ref 35–45)
HGB BLD-MCNC: 12.5 G/DL (ref 12–16)
IMM GRANULOCYTES # BLD AUTO: 0 K/UL (ref 0–0.04)
IMM GRANULOCYTES NFR BLD AUTO: 0 % (ref 0–0.5)
LYMPHOCYTES # BLD: 3.4 K/UL (ref 0.9–3.6)
LYMPHOCYTES NFR BLD: 30 % (ref 21–52)
MAGNESIUM SERPL-MCNC: 2 MG/DL (ref 1.6–2.6)
MCH RBC QN AUTO: 31.3 PG (ref 24–34)
MCHC RBC AUTO-ENTMCNC: 34 G/DL (ref 31–37)
MCV RBC AUTO: 92.2 FL (ref 78–100)
MONOCYTES # BLD: 0.9 K/UL (ref 0.05–1.2)
MONOCYTES NFR BLD: 8 % (ref 3–10)
NEUTS SEG # BLD: 6.6 K/UL (ref 1.8–8)
NEUTS SEG NFR BLD: 59 % (ref 40–73)
NRBC # BLD: 0 K/UL (ref 0–0.01)
NRBC BLD-RTO: 0 PER 100 WBC
PLATELET # BLD AUTO: 284 K/UL (ref 135–420)
PMV BLD AUTO: 9.7 FL (ref 9.2–11.8)
POTASSIUM SERPL-SCNC: 3.5 MMOL/L (ref 3.5–5.5)
RBC # BLD AUTO: 3.99 M/UL (ref 4.2–5.3)
SODIUM SERPL-SCNC: 139 MMOL/L (ref 136–145)
TROPONIN I SERPL HS-MCNC: 3 NG/L (ref 0–54)
WBC # BLD AUTO: 11.2 K/UL (ref 4.6–13.2)

## 2024-07-29 PROCEDURE — 99285 EMERGENCY DEPT VISIT HI MDM: CPT

## 2024-07-29 PROCEDURE — 80048 BASIC METABOLIC PNL TOTAL CA: CPT

## 2024-07-29 PROCEDURE — 83735 ASSAY OF MAGNESIUM: CPT

## 2024-07-29 PROCEDURE — 93005 ELECTROCARDIOGRAM TRACING: CPT | Performed by: PHYSICIAN ASSISTANT

## 2024-07-29 PROCEDURE — 71045 X-RAY EXAM CHEST 1 VIEW: CPT

## 2024-07-29 PROCEDURE — 70450 CT HEAD/BRAIN W/O DYE: CPT

## 2024-07-29 PROCEDURE — 93010 ELECTROCARDIOGRAM REPORT: CPT | Performed by: INTERNAL MEDICINE

## 2024-07-29 PROCEDURE — 84484 ASSAY OF TROPONIN QUANT: CPT

## 2024-07-29 PROCEDURE — 85025 COMPLETE CBC W/AUTO DIFF WBC: CPT

## 2024-07-29 PROCEDURE — 82962 GLUCOSE BLOOD TEST: CPT

## 2024-07-29 ASSESSMENT — ENCOUNTER SYMPTOMS
NAUSEA: 0
ABDOMINAL PAIN: 0
SHORTNESS OF BREATH: 0
VOMITING: 0

## 2024-07-29 ASSESSMENT — PAIN SCALES - GENERAL: PAINLEVEL_OUTOF10: 0

## 2024-07-29 ASSESSMENT — PAIN - FUNCTIONAL ASSESSMENT: PAIN_FUNCTIONAL_ASSESSMENT: 0-10

## 2024-07-29 NOTE — DISCHARGE INSTRUCTIONS
Follow-up with your primary care physician and neurologist within 2 days for reassessment. Bring the results from this visit with you for their review. Return to the ED immediately for any new, worsening, or persistent symptoms, including dizziness, changes in vision, or any other medical concerns.

## 2024-07-29 NOTE — ED PROVIDER NOTES
grammatical, syntax, homophones, and other interpretive errors are inadvertently transcribed by the computer software.  Please disregard these errors.  Please excuse any errors that have escaped final proofreading.       Emely Mcmillan PA  07/30/24 8859

## 2024-07-29 NOTE — ED TRIAGE NOTES
Pt in ED with c/o tingling that come and goes that started last week to the left side of her face. Pt also states its hard to lift her head when she is laying down and symptoms usually start when her BP is high. Pts BP in triage 124/82 currently  Pt has HX of TIA

## 2024-07-31 ENCOUNTER — HOSPITAL ENCOUNTER (OUTPATIENT)
Facility: HOSPITAL | Age: 49
Setting detail: RECURRING SERIES
Discharge: HOME | End: 2024-08-03
Payer: MEDICAID

## 2024-07-31 PROCEDURE — 97112 NEUROMUSCULAR REEDUCATION: CPT

## 2024-07-31 PROCEDURE — 97110 THERAPEUTIC EXERCISES: CPT

## 2024-07-31 PROCEDURE — 97530 THERAPEUTIC ACTIVITIES: CPT

## 2024-07-31 NOTE — PROGRESS NOTES
PHYSICAL / OCCUPATIONAL THERAPY - DAILY TREATMENT NOTE    Patient Name: Eva Alan Galdino    Date: 2024    : 1975  Insurance: Payor: [facility] MEDICAID / Plan: MARILYN [facility] HEALTHKEEPERS PLUS / Product Type: *No Product type* /      Patient  verified Yes     Visit #   Current / Total 2 12   Time   In / Out 10:00 10:39   Pain   In / Out 6/10 6/10   Subjective Functional Status/Changes: Pt. Reports she is doing ok today but has been sick lately. She had a couple days where she ran out of pain medication and had a lot of pain. She is now using a hinged AFO for ambulation.      TREATMENT AREA =  Right ankle pain [M25.571]  Abnormality of gait [R26.9]     OBJECTIVE    Therapeutic Procedures:    Tx Min Billable or 1:1 Min (if diff from Tx Min) Procedure, Rationale, Specifics   17  60465 Therapeutic Exercise (timed):  increase ROM, strength, coordination, balance, and proprioception to improve patient's ability to progress to PLOF and address remaining functional goals. (see flow sheet as applicable)     Details if applicable:  see flow sheet     12  67454 Neuromuscular Re-Education (timed):  improve balance, coordination, kinesthetic sense, posture, core stability and proprioception to improve patient's ability to develop conscious control of individual muscles and awareness of position of extremities in order to progress to PLOF and address remaining functional goals. (see flow sheet as applicable)     Details if applicable:  standing balance, haresh step overs, ambulation with mirror   10  86821 Therapeutic Activity (timed):  use of dynamic activities replicating functional movements to increase ROM, strength, coordination, balance, and proprioception in order to improve patient's ability to progress to PLOF and address remaining functional goals.  (see flow sheet as applicable)     Details if applicable:  education on self ankle post TC frances kidd          Details if applicable:            Details

## 2024-08-05 ENCOUNTER — HOSPITAL ENCOUNTER (OUTPATIENT)
Facility: HOSPITAL | Age: 49
Setting detail: RECURRING SERIES
Discharge: HOME | End: 2024-08-08
Payer: MEDICAID

## 2024-08-05 PROCEDURE — 97112 NEUROMUSCULAR REEDUCATION: CPT

## 2024-08-05 PROCEDURE — 97110 THERAPEUTIC EXERCISES: CPT

## 2024-08-05 NOTE — PROGRESS NOTES
PHYSICAL / OCCUPATIONAL THERAPY - DAILY TREATMENT NOTE    Patient Name: Eva Alan Galdino    Date: 2024    : 1975  Insurance: Payor: [facility] MEDICAID / Plan: MARILYN [facility] HEALTHKEEPERS PLUS / Product Type: *No Product type* /      Patient  verified Yes     Visit #   Current / Total 3 12   Time   In / Out 1040 1111   Pain   In / Out 6/10 7/10   Subjective Functional Status/Changes: Pt stated that she is doing ok today     TREATMENT AREA =  Right ankle pain [M25.571]  Abnormality of gait [R26.9]     OBJECTIVE         Therapeutic Procedures:    Tx Min Billable or 1:1 Min (if diff from Tx Min) Procedure, Rationale, Specifics   21  34486 Therapeutic Exercise (timed):  increase ROM, strength, coordination, balance, and proprioception to improve patient's ability to progress to PLOF and address remaining functional goals. (see flow sheet as applicable)     Details if applicable:       10  50687 Neuromuscular Re-Education (timed):  improve balance, coordination, kinesthetic sense, posture, core stability and proprioception to improve patient's ability to develop conscious control of individual muscles and awareness of position of extremities in order to progress to PLOF and address remaining functional goals. (see flow sheet as applicable)     Details if applicable:     31  [facility] Totals Reminder: bill using total billable min of TIMED therapeutic procedures (example: do not include dry needle or estim unattended, both untimed codes, in totals to left)  8-22 min = 1 unit; 23-37 min = 2 units; 38-52 min = 3 units; 53-67 min = 4 units; 68-82 min = 5 units   Total Total     [x]  Patient Education billed concurrently with other procedures   [x] Review HEP    [] Progressed/Changed HEP, detail:    [] Other detail:       Objective Information/Functional Measures/Assessment  Had increased pain with right MSR and would perform the second rep  Had significant pain with left foot on ball standing  Issued BTB for

## 2024-08-07 ENCOUNTER — HOSPITAL ENCOUNTER (OUTPATIENT)
Facility: HOSPITAL | Age: 49
Setting detail: RECURRING SERIES
End: 2024-08-07
Payer: MEDICAID

## 2024-10-12 ENCOUNTER — HOSPITAL ENCOUNTER (OUTPATIENT)
Facility: HOSPITAL | Age: 49
Discharge: HOME | End: 2024-10-15
Attending: PODIATRIST
Payer: MEDICAID

## 2024-10-12 DIAGNOSIS — M76.60 ACHILLES TENDINITIS, UNSPECIFIED LATERALITY: ICD-10-CM

## 2024-10-12 PROCEDURE — 73721 MRI JNT OF LWR EXTRE W/O DYE: CPT

## 2024-11-22 ENCOUNTER — TRANSCRIBE ORDERS (OUTPATIENT)
Facility: HOSPITAL | Age: 49
End: 2024-11-22

## 2024-11-22 ENCOUNTER — HOSPITAL ENCOUNTER (OUTPATIENT)
Facility: HOSPITAL | Age: 49
Setting detail: SPECIMEN
Discharge: HOME | End: 2024-11-25

## 2024-11-22 ENCOUNTER — HOSPITAL ENCOUNTER (OUTPATIENT)
Facility: HOSPITAL | Age: 49
Discharge: HOME | End: 2024-11-22
Payer: MEDICAID

## 2024-11-22 ENCOUNTER — HOSPITAL ENCOUNTER (OUTPATIENT)
Facility: HOSPITAL | Age: 49
End: 2024-11-22
Payer: MEDICAID

## 2024-11-22 DIAGNOSIS — Z01.812 PRE-OPERATIVE LABORATORY EXAMINATION: ICD-10-CM

## 2024-11-22 DIAGNOSIS — Z01.812 PRE-OPERATIVE LABORATORY EXAMINATION: Primary | ICD-10-CM

## 2024-11-22 LAB
EKG ATRIAL RATE: 80 BPM
EKG DIAGNOSIS: NORMAL
EKG P AXIS: 34 DEGREES
EKG P-R INTERVAL: 150 MS
EKG Q-T INTERVAL: 354 MS
EKG QRS DURATION: 78 MS
EKG QTC CALCULATION (BAZETT): 408 MS
EKG R AXIS: 49 DEGREES
EKG T AXIS: -47 DEGREES
EKG VENTRICULAR RATE: 80 BPM
LABCORP SPECIMEN COLLECTION: NORMAL

## 2024-11-22 PROCEDURE — 99001 SPECIMEN HANDLING PT-LAB: CPT

## 2024-11-22 PROCEDURE — 71045 X-RAY EXAM CHEST 1 VIEW: CPT

## 2024-11-22 PROCEDURE — 93010 ELECTROCARDIOGRAM REPORT: CPT | Performed by: INTERNAL MEDICINE

## 2024-11-22 PROCEDURE — 93005 ELECTROCARDIOGRAM TRACING: CPT

## 2024-12-16 RX ORDER — DULOXETIN HYDROCHLORIDE 30 MG/1
CAPSULE, DELAYED RELEASE ORAL
COMMUNITY
Start: 2024-11-05

## 2024-12-16 NOTE — PERIOP NOTE
Instructions for your surgery at [facility]      Today's Date:  12/16/2024      Patient's Name:  Eva Ahmadi           Surgery Date:  12/23              Please enter the main entrance of the hospital and check-in at the front security desk located in the lobby. They will direct you to the area to report for your surgery.     Do NOT eat or drink anything, including candy, gum, or ice chips after midnight prior to your surgery, unless you have specific instructions from your surgeon or anesthesia provider to do so.  Brush your teeth before coming to the hospital. You may swish with water, but do not swallow.  No smoking/Vaping/E-Cigarettes 24 hours prior to the day of surgery.  No alcohol 24 hours prior to the day of surgery.  No recreational drugs for one week prior to the day of surgery.  Bring Photo ID, Insurance information, and Co-pay if required on day of surgery.  Bring in pertinent legal documents, such as, Medical Power of , DNR, Advance Directive, etc.  Leave all valuables, including money/purse, at home.  Remove all jewelry, including ALL body piercings, nail polish, acrylic nails, and makeup (including mascara); no lotions, powders, deodorant, or perfume/cologne/after shave on the skin.  Follow instruction for Hibiclens washes and CHG wipes from surgeon's office.   Glasses and dentures may be worn to the hospital. They must be removed prior to surgery. Please bring case/container for glasses or dentures.   Contact lenses should not be worn on day of surgery.   Call your doctor's office if symptoms of a cold or illness develop within 24-48 hours prior to your surgery.  Call your doctor's office if you have any questions concerning insurance or co-pays.  15. AN ADULT (relative or friend 18 years or older) MUST DRIVE YOU HOME AFTER YOUR SURGERY.  16. Please make arrangements for a responsible adult (18 years or older) to be with you for 24 hours after your surgery.

## 2024-12-19 ENCOUNTER — TELEPHONE (OUTPATIENT)
Age: 49
End: 2024-12-19

## 2024-12-22 ENCOUNTER — ANESTHESIA EVENT (OUTPATIENT)
Facility: HOSPITAL | Age: 49
End: 2024-12-22
Payer: MEDICAID

## 2024-12-23 ENCOUNTER — ANESTHESIA (OUTPATIENT)
Facility: HOSPITAL | Age: 49
End: 2024-12-23
Payer: MEDICAID

## 2024-12-23 ENCOUNTER — HOSPITAL ENCOUNTER (OUTPATIENT)
Facility: HOSPITAL | Age: 49
Setting detail: OUTPATIENT SURGERY
Discharge: HOME | End: 2024-12-23
Attending: PODIATRIST | Admitting: PODIATRIST
Payer: MEDICAID

## 2024-12-23 VITALS
BODY MASS INDEX: 30.39 KG/M2 | WEIGHT: 182.4 LBS | TEMPERATURE: 97.6 F | HEIGHT: 65 IN | HEART RATE: 79 BPM | RESPIRATION RATE: 15 BRPM | DIASTOLIC BLOOD PRESSURE: 64 MMHG | OXYGEN SATURATION: 100 % | SYSTOLIC BLOOD PRESSURE: 109 MMHG

## 2024-12-23 DIAGNOSIS — G89.18 POST-OP PAIN: Primary | ICD-10-CM

## 2024-12-23 LAB
AMPHET UR QL SCN: NEGATIVE
BARBITURATES UR QL SCN: NEGATIVE
BENZODIAZ UR QL: NEGATIVE
CANNABINOIDS UR QL SCN: POSITIVE
COCAINE UR QL SCN: NEGATIVE
HCG UR QL: ABNORMAL
HCG UR QL: NEGATIVE
Lab: ABNORMAL
METHADONE UR QL: NEGATIVE
OPIATES UR QL: NEGATIVE
PCP UR QL: NEGATIVE

## 2024-12-23 PROCEDURE — 6360000002 HC RX W HCPCS: Performed by: PODIATRIST

## 2024-12-23 PROCEDURE — 2500000003 HC RX 250 WO HCPCS: Performed by: REGISTERED NURSE

## 2024-12-23 PROCEDURE — 6360000002 HC RX W HCPCS: Performed by: NURSE ANESTHETIST, CERTIFIED REGISTERED

## 2024-12-23 PROCEDURE — 7100000001 HC PACU RECOVERY - ADDTL 15 MIN: Performed by: PODIATRIST

## 2024-12-23 PROCEDURE — 2500000003 HC RX 250 WO HCPCS: Performed by: NURSE ANESTHETIST, CERTIFIED REGISTERED

## 2024-12-23 PROCEDURE — 7100000010 HC PHASE II RECOVERY - FIRST 15 MIN: Performed by: PODIATRIST

## 2024-12-23 PROCEDURE — 6370000000 HC RX 637 (ALT 250 FOR IP): Performed by: ANESTHESIOLOGY

## 2024-12-23 PROCEDURE — 7100000000 HC PACU RECOVERY - FIRST 15 MIN: Performed by: PODIATRIST

## 2024-12-23 PROCEDURE — C1762 CONN TISS, HUMAN(INC FASCIA): HCPCS | Performed by: PODIATRIST

## 2024-12-23 PROCEDURE — 3700000001 HC ADD 15 MINUTES (ANESTHESIA): Performed by: PODIATRIST

## 2024-12-23 PROCEDURE — 3600000012 HC SURGERY LEVEL 2 ADDTL 15MIN: Performed by: PODIATRIST

## 2024-12-23 PROCEDURE — 80307 DRUG TEST PRSMV CHEM ANLYZR: CPT

## 2024-12-23 PROCEDURE — 3600000002 HC SURGERY LEVEL 2 BASE: Performed by: PODIATRIST

## 2024-12-23 PROCEDURE — 2709999900 HC NON-CHARGEABLE SUPPLY: Performed by: PODIATRIST

## 2024-12-23 PROCEDURE — 3700000000 HC ANESTHESIA ATTENDED CARE: Performed by: PODIATRIST

## 2024-12-23 PROCEDURE — 81025 URINE PREGNANCY TEST: CPT

## 2024-12-23 PROCEDURE — 6370000000 HC RX 637 (ALT 250 FOR IP): Performed by: NURSE ANESTHETIST, CERTIFIED REGISTERED

## 2024-12-23 PROCEDURE — 6360000002 HC RX W HCPCS: Performed by: ANESTHESIOLOGY

## 2024-12-23 PROCEDURE — C1713 ANCHOR/SCREW BN/BN,TIS/BN: HCPCS | Performed by: PODIATRIST

## 2024-12-23 PROCEDURE — 2580000003 HC RX 258: Performed by: REGISTERED NURSE

## 2024-12-23 PROCEDURE — 6360000002 HC RX W HCPCS: Performed by: REGISTERED NURSE

## 2024-12-23 PROCEDURE — 2580000003 HC RX 258: Performed by: NURSE ANESTHETIST, CERTIFIED REGISTERED

## 2024-12-23 PROCEDURE — 7100000011 HC PHASE II RECOVERY - ADDTL 15 MIN: Performed by: PODIATRIST

## 2024-12-23 DEVICE — ANCHOR SUTURE L 24 MM DIA 4.5 MM CITREGEN EYELET PEEK ANK FT: Type: IMPLANTABLE DEVICE | Site: FOOT | Status: FUNCTIONAL

## 2024-12-23 DEVICE — GRAFT HUM TISS CHORION BASE THCK 8X4 CM AMNIO ACTISHIELD: Type: IMPLANTABLE DEVICE | Site: FOOT | Status: FUNCTIONAL

## 2024-12-23 RX ORDER — FAMOTIDINE 20 MG/1
20 TABLET, FILM COATED ORAL ONCE
Status: COMPLETED | OUTPATIENT
Start: 2024-12-23 | End: 2024-12-23

## 2024-12-23 RX ORDER — MIDAZOLAM HYDROCHLORIDE 1 MG/ML
INJECTION, SOLUTION INTRAMUSCULAR; INTRAVENOUS
Status: DISCONTINUED | OUTPATIENT
Start: 2024-12-23 | End: 2024-12-23 | Stop reason: SDUPTHER

## 2024-12-23 RX ORDER — KETOROLAC TROMETHAMINE 15 MG/ML
15 INJECTION, SOLUTION INTRAMUSCULAR; INTRAVENOUS ONCE
Status: COMPLETED | OUTPATIENT
Start: 2024-12-23 | End: 2024-12-23

## 2024-12-23 RX ORDER — SODIUM CHLORIDE, SODIUM LACTATE, POTASSIUM CHLORIDE, CALCIUM CHLORIDE 600; 310; 30; 20 MG/100ML; MG/100ML; MG/100ML; MG/100ML
INJECTION, SOLUTION INTRAVENOUS
Status: DISCONTINUED | OUTPATIENT
Start: 2024-12-23 | End: 2024-12-23 | Stop reason: SDUPTHER

## 2024-12-23 RX ORDER — SUCCINYLCHOLINE/SOD CL,ISO/PF 100 MG/5ML
SYRINGE (ML) INTRAVENOUS
Status: DISCONTINUED | OUTPATIENT
Start: 2024-12-23 | End: 2024-12-23 | Stop reason: SDUPTHER

## 2024-12-23 RX ORDER — LIDOCAINE HYDROCHLORIDE 10 MG/ML
1 INJECTION, SOLUTION EPIDURAL; INFILTRATION; INTRACAUDAL; PERINEURAL
Status: DISCONTINUED | OUTPATIENT
Start: 2024-12-23 | End: 2024-12-23 | Stop reason: HOSPADM

## 2024-12-23 RX ORDER — LIDOCAINE HYDROCHLORIDE 20 MG/ML
INJECTION, SOLUTION EPIDURAL; INFILTRATION; INTRACAUDAL; PERINEURAL
Status: DISCONTINUED | OUTPATIENT
Start: 2024-12-23 | End: 2024-12-23 | Stop reason: SDUPTHER

## 2024-12-23 RX ORDER — SODIUM CHLORIDE 9 MG/ML
INJECTION, SOLUTION INTRAVENOUS PRN
Status: DISCONTINUED | OUTPATIENT
Start: 2024-12-23 | End: 2024-12-23 | Stop reason: HOSPADM

## 2024-12-23 RX ORDER — HYDROMORPHONE HYDROCHLORIDE 2 MG/ML
INJECTION, SOLUTION INTRAMUSCULAR; INTRAVENOUS; SUBCUTANEOUS
Status: DISCONTINUED | OUTPATIENT
Start: 2024-12-23 | End: 2024-12-23 | Stop reason: SDUPTHER

## 2024-12-23 RX ORDER — SODIUM CHLORIDE 0.9 % (FLUSH) 0.9 %
5-40 SYRINGE (ML) INJECTION PRN
Status: DISCONTINUED | OUTPATIENT
Start: 2024-12-23 | End: 2024-12-23 | Stop reason: HOSPADM

## 2024-12-23 RX ORDER — PROPOFOL 10 MG/ML
INJECTION, EMULSION INTRAVENOUS
Status: DISCONTINUED | OUTPATIENT
Start: 2024-12-23 | End: 2024-12-23 | Stop reason: SDUPTHER

## 2024-12-23 RX ORDER — CEFAZOLIN SODIUM 1 G/3ML
INJECTION, POWDER, FOR SOLUTION INTRAMUSCULAR; INTRAVENOUS
Status: DISCONTINUED | OUTPATIENT
Start: 2024-12-23 | End: 2024-12-23 | Stop reason: SDUPTHER

## 2024-12-23 RX ORDER — FENTANYL CITRATE 50 UG/ML
50 INJECTION, SOLUTION INTRAMUSCULAR; INTRAVENOUS EVERY 5 MIN PRN
Status: DISCONTINUED | OUTPATIENT
Start: 2024-12-23 | End: 2024-12-23 | Stop reason: HOSPADM

## 2024-12-23 RX ORDER — FENTANYL CITRATE 50 UG/ML
INJECTION, SOLUTION INTRAMUSCULAR; INTRAVENOUS
Status: DISCONTINUED | OUTPATIENT
Start: 2024-12-23 | End: 2024-12-23 | Stop reason: SDUPTHER

## 2024-12-23 RX ORDER — ONDANSETRON 2 MG/ML
INJECTION INTRAMUSCULAR; INTRAVENOUS
Status: DISCONTINUED | OUTPATIENT
Start: 2024-12-23 | End: 2024-12-23 | Stop reason: SDUPTHER

## 2024-12-23 RX ORDER — DEXAMETHASONE SODIUM PHOSPHATE 4 MG/ML
INJECTION, SOLUTION INTRA-ARTICULAR; INTRALESIONAL; INTRAMUSCULAR; INTRAVENOUS; SOFT TISSUE
Status: DISCONTINUED | OUTPATIENT
Start: 2024-12-23 | End: 2024-12-23 | Stop reason: SDUPTHER

## 2024-12-23 RX ORDER — SODIUM CHLORIDE 0.9 % (FLUSH) 0.9 %
5-40 SYRINGE (ML) INJECTION EVERY 12 HOURS SCHEDULED
Status: DISCONTINUED | OUTPATIENT
Start: 2024-12-23 | End: 2024-12-23 | Stop reason: HOSPADM

## 2024-12-23 RX ORDER — LABETALOL HYDROCHLORIDE 5 MG/ML
INJECTION, SOLUTION INTRAVENOUS
Status: DISCONTINUED | OUTPATIENT
Start: 2024-12-23 | End: 2024-12-23 | Stop reason: SDUPTHER

## 2024-12-23 RX ORDER — ROCURONIUM BROMIDE 10 MG/ML
INJECTION, SOLUTION INTRAVENOUS
Status: DISCONTINUED | OUTPATIENT
Start: 2024-12-23 | End: 2024-12-23 | Stop reason: SDUPTHER

## 2024-12-23 RX ORDER — OXYCODONE AND ACETAMINOPHEN 5; 325 MG/1; MG/1
1 TABLET ORAL EVERY 6 HOURS PRN
Qty: 12 TABLET | Refills: 0 | Status: SHIPPED | OUTPATIENT
Start: 2024-12-23 | End: 2024-12-26

## 2024-12-23 RX ORDER — SODIUM CHLORIDE, SODIUM LACTATE, POTASSIUM CHLORIDE, CALCIUM CHLORIDE 600; 310; 30; 20 MG/100ML; MG/100ML; MG/100ML; MG/100ML
INJECTION, SOLUTION INTRAVENOUS CONTINUOUS
Status: DISCONTINUED | OUTPATIENT
Start: 2024-12-23 | End: 2024-12-23 | Stop reason: HOSPADM

## 2024-12-23 RX ORDER — OXYCODONE AND ACETAMINOPHEN 5; 325 MG/1; MG/1
1 TABLET ORAL ONCE
Status: COMPLETED | OUTPATIENT
Start: 2024-12-23 | End: 2024-12-23

## 2024-12-23 RX ADMIN — OXYCODONE HYDROCHLORIDE AND ACETAMINOPHEN 1 TABLET: 5; 325 TABLET ORAL at 16:21

## 2024-12-23 RX ADMIN — FENTANYL CITRATE 50 MCG: 50 INJECTION INTRAMUSCULAR; INTRAVENOUS at 14:42

## 2024-12-23 RX ADMIN — FAMOTIDINE 20 MG: 20 TABLET ORAL at 09:17

## 2024-12-23 RX ADMIN — FENTANYL CITRATE 50 MCG: 50 INJECTION INTRAMUSCULAR; INTRAVENOUS at 12:22

## 2024-12-23 RX ADMIN — LABETALOL HYDROCHLORIDE 5 MG: 5 INJECTION, SOLUTION INTRAVENOUS at 13:29

## 2024-12-23 RX ADMIN — LIDOCAINE HYDROCHLORIDE 50 MG: 20 INJECTION, SOLUTION EPIDURAL; INFILTRATION; INTRACAUDAL; PERINEURAL at 12:22

## 2024-12-23 RX ADMIN — ONDANSETRON 4 MG: 2 INJECTION, SOLUTION INTRAMUSCULAR; INTRAVENOUS at 13:42

## 2024-12-23 RX ADMIN — MIDAZOLAM 2 MG: 1 INJECTION, SOLUTION INTRAMUSCULAR; INTRAVENOUS at 12:19

## 2024-12-23 RX ADMIN — DEXAMETHASONE SODIUM PHOSPHATE 8 MG: 4 INJECTION INTRA-ARTICULAR; INTRALESIONAL; INTRAMUSCULAR; INTRAVENOUS; SOFT TISSUE at 12:50

## 2024-12-23 RX ADMIN — HYDROMORPHONE HYDROCHLORIDE 0.2 MG: 2 INJECTION INTRAMUSCULAR; INTRAVENOUS; SUBCUTANEOUS at 13:51

## 2024-12-23 RX ADMIN — FENTANYL CITRATE 50 MCG: 50 INJECTION INTRAMUSCULAR; INTRAVENOUS at 14:54

## 2024-12-23 RX ADMIN — PROPOFOL 200 MG: 10 INJECTION, EMULSION INTRAVENOUS at 12:22

## 2024-12-23 RX ADMIN — SODIUM CHLORIDE, POTASSIUM CHLORIDE, SODIUM LACTATE AND CALCIUM CHLORIDE: 600; 310; 30; 20 INJECTION, SOLUTION INTRAVENOUS at 09:17

## 2024-12-23 RX ADMIN — HYDROMORPHONE HYDROCHLORIDE 0.5 MG: 1 INJECTION, SOLUTION INTRAMUSCULAR; INTRAVENOUS; SUBCUTANEOUS at 15:37

## 2024-12-23 RX ADMIN — KETOROLAC TROMETHAMINE 15 MG: 15 INJECTION INTRAMUSCULAR at 15:56

## 2024-12-23 RX ADMIN — CEFAZOLIN 2 G: 1 INJECTION, POWDER, FOR SOLUTION INTRAMUSCULAR; INTRAVENOUS at 12:48

## 2024-12-23 RX ADMIN — Medication 100 MG: at 12:22

## 2024-12-23 RX ADMIN — LABETALOL HYDROCHLORIDE 5 MG: 5 INJECTION, SOLUTION INTRAVENOUS at 13:51

## 2024-12-23 RX ADMIN — SODIUM CHLORIDE, SODIUM LACTATE, POTASSIUM CHLORIDE, AND CALCIUM CHLORIDE: 600; 310; 30; 20 INJECTION, SOLUTION INTRAVENOUS at 12:18

## 2024-12-23 RX ADMIN — ROCURONIUM BROMIDE 40 MG: 10 INJECTION, SOLUTION INTRAVENOUS at 12:55

## 2024-12-23 RX ADMIN — SUGAMMADEX 200 MG: 100 INJECTION, SOLUTION INTRAVENOUS at 14:06

## 2024-12-23 RX ADMIN — LABETALOL HYDROCHLORIDE 5 MG: 5 INJECTION, SOLUTION INTRAVENOUS at 13:24

## 2024-12-23 RX ADMIN — ROCURONIUM BROMIDE 10 MG: 10 INJECTION, SOLUTION INTRAVENOUS at 12:22

## 2024-12-23 RX ADMIN — FENTANYL CITRATE 50 MCG: 50 INJECTION INTRAMUSCULAR; INTRAVENOUS at 12:44

## 2024-12-23 RX ADMIN — HYDROMORPHONE HYDROCHLORIDE 0.6 MG: 2 INJECTION INTRAMUSCULAR; INTRAVENOUS; SUBCUTANEOUS at 13:15

## 2024-12-23 ASSESSMENT — PAIN DESCRIPTION - ORIENTATION
ORIENTATION: RIGHT

## 2024-12-23 ASSESSMENT — PAIN SCALES - GENERAL
PAINLEVEL_OUTOF10: 4
PAINLEVEL_OUTOF10: 6
PAINLEVEL_OUTOF10: 8
PAINLEVEL_OUTOF10: 7

## 2024-12-23 ASSESSMENT — PAIN DESCRIPTION - DESCRIPTORS
DESCRIPTORS: THROBBING
DESCRIPTORS: ACHING;DISCOMFORT
DESCRIPTORS: ACHING

## 2024-12-23 ASSESSMENT — PAIN DESCRIPTION - LOCATION
LOCATION: ANKLE

## 2024-12-23 ASSESSMENT — PAIN DESCRIPTION - PAIN TYPE
TYPE: SURGICAL PAIN

## 2024-12-23 ASSESSMENT — PAIN - FUNCTIONAL ASSESSMENT
PAIN_FUNCTIONAL_ASSESSMENT: ACTIVITIES ARE NOT PREVENTED
PAIN_FUNCTIONAL_ASSESSMENT: ACTIVITIES ARE NOT PREVENTED
PAIN_FUNCTIONAL_ASSESSMENT: 0-10
PAIN_FUNCTIONAL_ASSESSMENT: ACTIVITIES ARE NOT PREVENTED
PAIN_FUNCTIONAL_ASSESSMENT: ACTIVITIES ARE NOT PREVENTED

## 2024-12-23 NOTE — DISCHARGE INSTRUCTIONS
Keep dressings dry, clean, intact. Remain NWB to right foot. Ice and elevate foot as instructed. Pain control prn with percocet. Finish course of augmentin as prescribed. Continue to take aspirin 81 mg bid for DVT prophylaxis.

## 2024-12-23 NOTE — H&P
Update History & Physical    The patient's History and Physical of PCP was reviewed with the patient and I examined the patient. There was no change. The surgical site was confirmed by the patient and me.       Plan: The risks, benefits, expected outcome, and alternative to the recommended procedure have been discussed with the patient. Patient understands and wants to proceed with the procedure.     Electronically signed by Latoya Pittman DPM on 12/23/2024 at 11:30 AM

## 2024-12-23 NOTE — ANESTHESIA PRE PROCEDURE
Department of Anesthesiology  Preprocedure Note       Name:  Eva Ahmadi   Age:  49 y.o.  :  1975                                          MRN:  [id number]         Date:  2024      Surgeon: Surgeon(s):  Latoya Pittman DPM    Procedure: Procedure(s):  RIGHT FOOT REMOVAL POSTERIORSUPERIOR CALCANEUS WITH REPAIR OF DISTAL ACHILLES TENDON; C-ARM; [[facility] [facility]]  *MAC/LOCAL*    Medications prior to admission:   Prior to Admission medications    Medication Sig Start Date End Date Taking? Authorizing Provider   lidocaine (LIDODERM) 5 % APPLY 1 PATCH TO CLEAN DRY SKIN DAILY 12 HOURS ON AND 12 HOURS OFF 24  Yes Raf Marion MD   amLODIPine (NORVASC) 10 MG tablet TAKE ONE TABLET BY MOUTH DAILY 23  Yes Amita Seals NP-C   DULoxetine (CYMBALTA) 30 MG extended release capsule  24   ProviderChristine MD   meloxicam (MOBIC) 15 MG tablet TAKE 1 TABLET BY MOUTH DAILY  Patient not taking: Reported on 2024   Raf Marion MD   ondansetron (ZOFRAN-ODT) 4 MG disintegrating tablet Take 1 tablet by mouth 3 times daily as needed for Nausea or Vomiting  Patient not taking: Reported on 2024   Yoandy Herring MD   dicyclomine (BENTYL) 20 MG tablet Take 1 tablet by mouth 3 times daily as needed (abdominal cramps)  Patient not taking: Reported on 2024   Yoandy Herring MD   ibuprofen (ADVIL;MOTRIN) 800 MG tablet Take 1 tablet by mouth every 8 hours as needed for Pain 23   Ceci Moeller APRN - NP   hydroCHLOROthiazide (MICROZIDE) 12.5 MG capsule TAKE 1 CAPSULE BY MOUTH DAILY FOR HIGH BLOOD PRESSURE 23   Amita Seals NP-C   aspirin 81 MG EC tablet Take 2 tablets by mouth daily    ProviderChristine MD       Current medications:    Current Facility-Administered Medications   Medication Dose Route Frequency Provider Last Rate Last Admin   • ceFAZolin (ANCEF) 2,000 mg in sterile water 20 mL IV syringe  2,000 mg

## 2024-12-24 NOTE — ANESTHESIA POSTPROCEDURE EVALUATION
Department of Anesthesiology  Postprocedure Note    Patient: Eva Ahmadi  MRN: [id number]  YOB: 1975  Date of evaluation: 12/23/2024    Procedure Summary       Date: 12/23/24 Room / Location: [facility] MAIN 06 / [facility] MAIN OR    Anesthesia Start: 1218 Anesthesia Stop: 1424    Procedure: RIGHT FOOT REMOVAL POSTERIORSUPERIOR CALCANEUS WITH REPAIR OF DISTAL ACHILLES TENDON; C-ARM; [[facility] [facility]]  *MAC/LOCAL* (Right: Foot) Diagnosis:       Other synovitis and tenosynovitis, right ankle and foot      Tendonitis, Achilles, right      (Other synovitis and tenosynovitis, right ankle and foot [M65.871])      (Tendonitis, Achilles, right [M76.61])    Surgeons: Latoya Pittman DPM Responsible Provider: Jose Roberto Gómez MD    Anesthesia Type: General ASA Status: 3            Anesthesia Type: General    Dinesh Phase I: Dinesh Score: 10    Dinesh Phase II:      Anesthesia Post Evaluation    Patient location during evaluation: bedside  Patient participation: complete - patient participated  Level of consciousness: responsive to verbal stimuli  Airway patency: patent  Nausea & Vomiting: no nausea  Respiratory status: acceptable  Hydration status: euvolemic    No notable events documented.

## 2024-12-27 NOTE — OP NOTE
Operative Note      Patient: Eva Ahmadi  YOB: 1975  MRN: [id number]    Date of Procedure: 12/23/2024    Pre-Op Diagnosis:  Right distal achilles tendinosis with retrocalcaneal spur    Post-Op Diagnosis: Same       Procedure(s):  RIGHT FOOT REMOVAL POSTERIORSUPERIOR CALCANEUS WITH REPAIR OF DISTAL ACHILLES TENDON; C-ARM; [[facility]]  *MAC/LOCAL*    Surgeon(s):  Latoya Pittman DPM    Assistant:   Surgical Assistant: Rafael Carlin    Anesthesia: Monitor Anesthesia Care with local    Estimated Blood Loss (mL): Minimal    Complications: None    Specimens:   * No specimens in log *    Implants:  Implant Name Type Inv. Item Serial No.  Lot No. LRB No. Used Action   ANCHOR SUTURE L 24 MM MINESH 4.5 MM [employer] ANK FT - [id number]  ANCHOR SUTURE L 24 MM MINESH 4.5 MM [employer] ANK FT  [employer] [facility] [id number] Right 2 Implanted   ANCHOR SUTURE L 24 MM MINESH 4.5 MM [employer] EYELET PEEK ANK FT - [id number]  ANCHOR SUTURE L 24 MM MINESH 4.5 MM [employer] ANK FT  [employer] [facility] [id number] Right 1 Implanted   ANCHOR SUTURE L 24 MM MINESH 4.5 MM [employer] E[employer]LET [employer] ANK FT - [id number]  ANCHOR SUTURE L 24 MM MINESH 4.5 MM [employer] E[employer]LET [employer] ANK FT  [employer] [facility] [id number] Right 1 Implanted   GRAFT HUM TISS CHORION BASE THCK 8X4 CM AMNIO ACTISHIELD - [id number]  GRAFT HUM TISS CHORION BASE THCK 8X4 CM AMNIO ACTISHIELD [id number] [employer] [facility]  Right 1 Implanted         Drains: * No LDAs found *    Findings:  Infection Present At Time Of Surgery (PATOS) (choose all levels that have infection present):  No infection present  Other Findings: As noted below    Indications: The patient was seen in office for right posterior heel pain. Patient had painful bump on posterior heel and reported her pain to be chronic. Patient had attempted conservative treatment without improvement. She wanted to proceed with

## 2025-01-13 ENCOUNTER — OFFICE VISIT (OUTPATIENT)
Age: 50
End: 2025-01-13
Payer: MEDICAID

## 2025-01-13 VITALS
OXYGEN SATURATION: 97 % | BODY MASS INDEX: 30.92 KG/M2 | TEMPERATURE: 97.7 F | WEIGHT: 185.6 LBS | SYSTOLIC BLOOD PRESSURE: 116 MMHG | HEART RATE: 98 BPM | DIASTOLIC BLOOD PRESSURE: 72 MMHG | HEIGHT: 65 IN

## 2025-01-13 DIAGNOSIS — M79.2 NEUROPATHIC PAIN: ICD-10-CM

## 2025-01-13 DIAGNOSIS — M62.838 MUSCLE SPASM: ICD-10-CM

## 2025-01-13 DIAGNOSIS — R29.898 LEFT HAND WEAKNESS: ICD-10-CM

## 2025-01-13 DIAGNOSIS — R20.0 LEFT ARM NUMBNESS: Primary | ICD-10-CM

## 2025-01-13 DIAGNOSIS — R20.2 TINGLING OF LEFT UPPER EXTREMITY: ICD-10-CM

## 2025-01-13 DIAGNOSIS — M50.30 DDD (DEGENERATIVE DISC DISEASE), CERVICAL: ICD-10-CM

## 2025-01-13 PROCEDURE — 3078F DIAST BP <80 MM HG: CPT | Performed by: NURSE PRACTITIONER

## 2025-01-13 PROCEDURE — 3074F SYST BP LT 130 MM HG: CPT | Performed by: NURSE PRACTITIONER

## 2025-01-13 PROCEDURE — 99213 OFFICE O/P EST LOW 20 MIN: CPT | Performed by: NURSE PRACTITIONER

## 2025-01-13 RX ORDER — ASPIRIN 81 MG/1
162 TABLET ORAL DAILY
Qty: 60 TABLET | Refills: 11 | Status: SHIPPED | OUTPATIENT
Start: 2025-01-13

## 2025-01-13 RX ORDER — TIZANIDINE 2 MG/1
2-4 TABLET ORAL 2 TIMES DAILY PRN
Qty: 60 TABLET | Refills: 3 | Status: SHIPPED | OUTPATIENT
Start: 2025-01-13

## 2025-01-13 RX ORDER — GABAPENTIN 100 MG/1
CAPSULE ORAL
Qty: 180 CAPSULE | Refills: 3 | Status: SHIPPED | OUTPATIENT
Start: 2025-01-13 | End: 2025-04-14

## 2025-01-13 NOTE — PATIENT INSTRUCTIONS
Patient instructions:  -referral to Dr. Paniagua (ortho/spine) at  [facility]  [address], [address], [place] [address]  [phone]    May consider repeat EMG/nerve conduction study    [facility]  [facility]/ [facility]    -gabapentin 100 mg twice daily for a week then 200 mg twice daily for a week then 300 mg twice daily. Can stay at lowest effective dose. For nerve pain.     -tizanidine as needed for muscle spasms

## 2025-01-13 NOTE — PROGRESS NOTES
symmetrical. LT reduced L upper arm, lower arm, and hand. PP intact x 4 extremities. DTRs +2. Robertson negative bilaterally but has index finger flexion. Tinel negative at the wrists. Ambulates with scooter, cannot weight bear on right foot.       Impression/Plan:  This is a 49-year-old right-handed female who presents in follow-up.  She had been seen in the hospital in neurology consultation for possible TIA in October 2021; at that, it was noted that she had a transient episode of left facial droop and left upper extremity weakness.  Noted at that time to have left lower face drooping and numbness, also involvement of the left upper extremity with weakness and some tremor.  Symptoms resolved in 10 minutes at that time.  BP was elevated.  MRI did not show any acute changes.  She has been on aspirin since that time.  She had a post hospital follow-up visit in November 2021 and the left facial symptoms and right upper extremity symptoms were noted at that time.  She reports she still has left arm tightening and numbness and tingling, has to calm down and stop movement to help it stop.  She had EMG/NCS in August 2023 consistent with moderate CTS on right and mild CTS on left.  We reviewed the results of the MRI brain and MRI c-spine again.  Will place new referral to ortho/spine for neck pain and cervical degenerative disc disease and PT referral for neck pain as well.  She gets tightness of the left upper arm and can experience it if laying in a certain position with neck position change, and gets numbness/tingling down the LUE.  Denies much hand symptoms.  Might be related to cervical DDD.  She is on duloxetine which she believes helps some for the pain.  Will add gabapentin 100 mg BID x 1 week then 200 mg BID x 1 week then 300 mg BID thereafter.  Advised on administration and potential side effects of the medication.  Advised to take the lowest effective dose.  Will start tizanidine prn muscle spasms.  May consider

## 2025-01-21 NOTE — PROGRESS NOTES
[facility]  [address], [address]  [place] [address]  Phone: [phone]  Fax: [phone]        Eva Ahmadi  : 1975  PCP: Patricia Wilkes MD  2025     PROGRESS NOTE     Consent: vEa Ahmadi was evaluated through a patient-initiated, synchronous (real-time) audio only encounter. She (or guardian if applicable) is aware that it is a billable service, which includes applicable co-pays, with coverage as determined by her insurance carrier. This visit was conducted with the patient's (and/or legan guardian's) verbal consent. She has not had a related appointment within my department in the past 7 days or scheduled within the next 24 hours. The patient was located in a state where the provider was licensed to provide care.     The patient was located at Home: [address] [place] [id number]  Provider was located at Facility (Appt Dept): [address] [place] [address]  [address]  [place] [address]     The visit was conducted in the office with the patient being in home through a telephone platform.  Visit time start: 11:13AM end: 11:26AM    HISTORY OF PRESENT ILLNESS  Eva Ahmadi was seen virtually today for evaluation of neck pain. Pt also c/o left upper arm swelling and numbness/tingling in forearm onset 2 years ago. Pt previously seen for BUE EMG 23 significant for mod R CTS and mild L CTS. Pt notes of exacerbation of symptoms with sudden movements. Pt notes of increased pain and numbness/tingling radiating down the arm when tilting head back. Pt uses heat pack with some benefit. Pt notes of recent achilles surgery. Pt takes Gabapentin 100mg BID ramp. Per NP [place], ramp to [id number] BID. Pt notes she is in process of scheduling with PT.     PmHx: HTN, TIA, mod R CTS, mild L CTS     reviewed.     REVIEW OF SYSTEMS  Review of Systems   Constitutional:  Negative for fever and unexpected weight change.   HENT:

## 2025-01-22 ENCOUNTER — SCHEDULED TELEPHONE ENCOUNTER (OUTPATIENT)
Age: 50
End: 2025-01-22
Payer: MEDICAID

## 2025-01-22 DIAGNOSIS — M79.18 CHRONIC PRIMARY MUSCULOSKELETAL PAIN: ICD-10-CM

## 2025-01-22 DIAGNOSIS — M54.2 CERVICAL PAIN: ICD-10-CM

## 2025-01-22 DIAGNOSIS — M54.12 CERVICAL RADICULOPATHY: Primary | ICD-10-CM

## 2025-01-22 DIAGNOSIS — G89.29 CHRONIC PRIMARY MUSCULOSKELETAL PAIN: ICD-10-CM

## 2025-01-22 PROCEDURE — 99422 OL DIG E/M SVC 11-20 MIN: CPT | Performed by: PHYSICAL MEDICINE & REHABILITATION

## 2025-01-22 RX ORDER — PREDNISONE 10 MG/1
TABLET ORAL
Qty: 1 EACH | Refills: 0 | Status: SHIPPED | OUTPATIENT
Start: 2025-01-22

## 2025-01-22 ASSESSMENT — ENCOUNTER SYMPTOMS
WHEEZING: 0
SHORTNESS OF BREATH: 0
NAUSEA: 0
TROUBLE SWALLOWING: 0
VOMITING: 0

## 2025-01-22 NOTE — PATIENT INSTRUCTIONS
[facility].  Address: [address] [place] [id number]  Phone: [phone]

## 2025-01-23 ENCOUNTER — TELEPHONE (OUTPATIENT)
Age: 50
End: 2025-01-23

## 2025-01-29 ENCOUNTER — TELEPHONE (OUTPATIENT)
Age: 50
End: 2025-01-29

## 2025-01-30 ENCOUNTER — TELEPHONE (OUTPATIENT)
Age: 50
End: 2025-01-30

## 2025-01-30 DIAGNOSIS — M79.2 NEUROPATHIC PAIN: ICD-10-CM

## 2025-01-30 DIAGNOSIS — R20.2 TINGLING OF LEFT UPPER EXTREMITY: ICD-10-CM

## 2025-01-30 NOTE — TELEPHONE ENCOUNTER
Patient requesting refill gabapentin, says the way you told her take she ran out early & her pharmacy will not prescribe.

## 2025-02-05 RX ORDER — GABAPENTIN 100 MG/1
CAPSULE ORAL
Qty: 180 CAPSULE | Refills: 3 | Status: SHIPPED | OUTPATIENT
Start: 2025-02-05 | End: 2025-05-07

## 2025-02-05 NOTE — TELEPHONE ENCOUNTER
I sent 180 capsules which would be enough (if she gradually goes up to 3 caps twice daily) plus 3 refills.  I do not know why the pharmacy only gave 60 capsules.  I am going to send it again.

## 2025-02-14 ENCOUNTER — TELEPHONE (OUTPATIENT)
Age: 50
End: 2025-02-14

## 2025-02-14 NOTE — TELEPHONE ENCOUNTER
Patient wanted you to know when she doesn't take medication she feels like its buzzing in her head,vibration, feels like a brain shock.also says she can't move as well.

## 2025-02-18 ENCOUNTER — APPOINTMENT (OUTPATIENT)
Facility: HOSPITAL | Age: 50
End: 2025-02-18
Payer: MEDICAID

## 2025-02-18 ENCOUNTER — HOSPITAL ENCOUNTER (EMERGENCY)
Facility: HOSPITAL | Age: 50
Discharge: HOME | End: 2025-02-18
Attending: EMERGENCY MEDICINE
Payer: MEDICAID

## 2025-02-18 VITALS
HEART RATE: 78 BPM | TEMPERATURE: 98.1 F | OXYGEN SATURATION: 98 % | DIASTOLIC BLOOD PRESSURE: 78 MMHG | HEIGHT: 65 IN | RESPIRATION RATE: 16 BRPM | SYSTOLIC BLOOD PRESSURE: 111 MMHG | BODY MASS INDEX: 29.99 KG/M2 | WEIGHT: 180 LBS

## 2025-02-18 DIAGNOSIS — M79.604 PAIN OF RIGHT LOWER EXTREMITY: Primary | ICD-10-CM

## 2025-02-18 DIAGNOSIS — G89.18 POST-OPERATIVE PAIN: ICD-10-CM

## 2025-02-18 LAB
ANION GAP SERPL CALC-SCNC: 2 MMOL/L (ref 3–18)
BASOPHILS # BLD: 0.03 K/UL (ref 0–0.1)
BASOPHILS NFR BLD: 0.3 % (ref 0–2)
BUN SERPL-MCNC: 12 MG/DL (ref 7–18)
BUN/CREAT SERPL: 17 (ref 12–20)
CALCIUM SERPL-MCNC: 8.3 MG/DL (ref 8.5–10.1)
CHLORIDE SERPL-SCNC: 113 MMOL/L (ref 100–111)
CO2 SERPL-SCNC: 26 MMOL/L (ref 21–32)
CREAT SERPL-MCNC: 0.69 MG/DL (ref 0.6–1.3)
CRP SERPL-MCNC: 0.3 MG/DL (ref 0–0.3)
DIFFERENTIAL METHOD BLD: ABNORMAL
EOSINOPHIL # BLD: 0.11 K/UL (ref 0–0.4)
EOSINOPHIL NFR BLD: 1.1 % (ref 0–5)
ERYTHROCYTE [DISTWIDTH] IN BLOOD BY AUTOMATED COUNT: 16.8 % (ref 11.6–14.5)
ERYTHROCYTE [SEDIMENTATION RATE] IN BLOOD: 48 MM/HR (ref 0–30)
GLUCOSE SERPL-MCNC: 101 MG/DL (ref 74–99)
HCT VFR BLD AUTO: 36.2 % (ref 35–45)
HGB BLD-MCNC: 11.8 G/DL (ref 12–16)
IMM GRANULOCYTES # BLD AUTO: 0.03 K/UL (ref 0–0.04)
IMM GRANULOCYTES NFR BLD AUTO: 0.3 % (ref 0–0.5)
LYMPHOCYTES # BLD: 2.39 K/UL (ref 0.9–3.6)
LYMPHOCYTES NFR BLD: 24.5 % (ref 21–52)
MCH RBC QN AUTO: 32.2 PG (ref 24–34)
MCHC RBC AUTO-ENTMCNC: 32.6 G/DL (ref 31–37)
MCV RBC AUTO: 98.6 FL (ref 78–100)
MONOCYTES # BLD: 0.66 K/UL (ref 0.05–1.2)
MONOCYTES NFR BLD: 6.8 % (ref 3–10)
NEUTS SEG # BLD: 6.52 K/UL (ref 1.8–8)
NEUTS SEG NFR BLD: 67 % (ref 40–73)
NRBC # BLD: 0 K/UL (ref 0–0.01)
NRBC BLD-RTO: 0 PER 100 WBC
PLATELET # BLD AUTO: 257 K/UL (ref 135–420)
PMV BLD AUTO: 9.6 FL (ref 9.2–11.8)
POTASSIUM SERPL-SCNC: 3.3 MMOL/L (ref 3.5–5.5)
RBC # BLD AUTO: 3.67 M/UL (ref 4.2–5.3)
SODIUM SERPL-SCNC: 141 MMOL/L (ref 136–145)
WBC # BLD AUTO: 9.7 K/UL (ref 4.6–13.2)

## 2025-02-18 PROCEDURE — 99284 EMERGENCY DEPT VISIT MOD MDM: CPT

## 2025-02-18 PROCEDURE — 80048 BASIC METABOLIC PNL TOTAL CA: CPT

## 2025-02-18 PROCEDURE — 73610 X-RAY EXAM OF ANKLE: CPT

## 2025-02-18 PROCEDURE — 85652 RBC SED RATE AUTOMATED: CPT

## 2025-02-18 PROCEDURE — 85025 COMPLETE CBC W/AUTO DIFF WBC: CPT

## 2025-02-18 PROCEDURE — 6370000000 HC RX 637 (ALT 250 FOR IP): Performed by: EMERGENCY MEDICINE

## 2025-02-18 PROCEDURE — 86140 C-REACTIVE PROTEIN: CPT

## 2025-02-18 RX ORDER — HYDROCODONE BITARTRATE AND ACETAMINOPHEN 5; 325 MG/1; MG/1
2 TABLET ORAL
Status: COMPLETED | OUTPATIENT
Start: 2025-02-18 | End: 2025-02-18

## 2025-02-18 RX ORDER — HYDROCODONE BITARTRATE AND ACETAMINOPHEN 5; 325 MG/1; MG/1
1 TABLET ORAL EVERY 6 HOURS PRN
Qty: 10 TABLET | Refills: 0 | Status: SHIPPED | OUTPATIENT
Start: 2025-02-18 | End: 2025-02-21

## 2025-02-18 RX ORDER — CLINDAMYCIN HYDROCHLORIDE 300 MG/1
300 CAPSULE ORAL 3 TIMES DAILY
Qty: 21 CAPSULE | Refills: 0 | Status: SHIPPED | OUTPATIENT
Start: 2025-02-18 | End: 2025-02-25

## 2025-02-18 RX ADMIN — HYDROCODONE BITARTRATE AND ACETAMINOPHEN 2 TABLET: 5; 325 TABLET ORAL at 10:51

## 2025-02-18 ASSESSMENT — PAIN DESCRIPTION - LOCATION: LOCATION: FOOT

## 2025-02-18 ASSESSMENT — PAIN DESCRIPTION - ORIENTATION: ORIENTATION: RIGHT

## 2025-02-18 ASSESSMENT — PAIN DESCRIPTION - PAIN TYPE: TYPE: SURGICAL PAIN

## 2025-02-18 ASSESSMENT — PAIN SCALES - GENERAL
PAINLEVEL_OUTOF10: 10
PAINLEVEL_OUTOF10: 0

## 2025-02-18 ASSESSMENT — PAIN DESCRIPTION - DESCRIPTORS: DESCRIPTORS: SHARP;SHOOTING;ACHING

## 2025-02-18 ASSESSMENT — PAIN - FUNCTIONAL ASSESSMENT: PAIN_FUNCTIONAL_ASSESSMENT: 0-10

## 2025-02-18 NOTE — DISCHARGE INSTRUCTIONS
Return to the ER for severe pain, difficulty breathing, inability tolerate food or fluids by mouth, new fever, new rash, any other concerning signs or symptoms.  As discussed please keep your follow-up appointment with your podiatrist on February 20 as scheduled.

## 2025-02-18 NOTE — ED PROVIDER NOTES
[facility] EMERGENCY DEPARTMENT  EMERGENCY DEPARTMENT ENCOUNTER      Pt Name: Eva Ahmadi  MRN: [id number]  Birthdate 1975  Date of evaluation: 2/18/2025  Provider: Giancarlo Green MD  5:36 PM    CHIEF COMPLAINT       Chief Complaint   Patient presents with    Post-op Problem    Foot Pain         HISTORY OF PRESENT ILLNESS    Eva Ahmadi is a 50 y.o. female who presents to the emergency department      50-year-old female history of hypertension hypercholesterolemia here with concerns for wound infection of her right Achilles after repair by Dr. Pittman in December.  Patient notes that she seen Dr. Pittman in follow-up several times for pain and possible infection, has completed a course of antibiotics.  She notes that she has had worsening pain in her heel over the last 2 days, she feels nauseated when she wakes up in the morning up although that improves throughout the day.  Has pain when ambulating to keep her leg in the boot as prescribed.  She is concerned because she has an appointment with Dr. Pittman on the 20th but the impending snowstorm is likely to cancel all appointments so here for evaluation.  Denies chest pain or shortness of breath, denies abdominal pain, no diarrhea, no headache or vision changes.          Nursing Notes were reviewed.    REVIEW OF SYSTEMS       Review of Systems    Except as noted above the remainder of the review of systems was reviewed and negative.       PAST MEDICAL HISTORY     Past Medical History:   Diagnosis Date    Breast cancer screening by mammogram 08/16/2019    Breast biopsy benign. May resume routine annual screenings.    Hypercholesterolemia     Hypertension     TIA (transient ischemic attack)          SURGICAL HISTORY       Past Surgical History:   Procedure Laterality Date    ABDOMEN SURGERY      ACHILLES TENDON SURGERY Right 12/23/2024    RIGHT FOOT REMOVAL POSTERIORSUPERIOR CALCANEUS WITH REPAIR OF DISTAL ACHILLES TENDON; C-ARM;

## 2025-02-18 NOTE — ED TRIAGE NOTES
Pt on knee scooter to triage, reports achilles tendon procedure but has pain \"like a 20\" when she puts her foot down and wound still draining green pus.   Wants to make sure it's healing properly.   Pain to foot is radiating up back and neck.   Pt has appt on the 20th but concerned due to snow she won't be able to make it

## 2025-03-13 ENCOUNTER — TRANSCRIBE ORDERS (OUTPATIENT)
Facility: HOSPITAL | Age: 50
End: 2025-03-13

## 2025-03-13 DIAGNOSIS — Z12.31 ENCOUNTER FOR SCREENING MAMMOGRAM FOR MALIGNANT NEOPLASM OF BREAST: Primary | ICD-10-CM

## 2025-03-31 ENCOUNTER — TELEPHONE (OUTPATIENT)
Age: 50
End: 2025-03-31

## 2025-03-31 RX ORDER — LIDOCAINE 50 MG/G
PATCH TOPICAL
Qty: 30 PATCH | Refills: 3 | Status: SHIPPED | OUTPATIENT
Start: 2025-03-31

## 2025-03-31 NOTE — TELEPHONE ENCOUNTER
Patient called stating the gabepentin to strong for her, wanted to know if you could call another medication in

## 2025-04-24 ENCOUNTER — HOSPITAL ENCOUNTER (OUTPATIENT)
Facility: HOSPITAL | Age: 50
Discharge: HOME | End: 2025-04-27
Payer: MEDICAID

## 2025-04-24 VITALS — HEIGHT: 65 IN | BODY MASS INDEX: 29.99 KG/M2 | WEIGHT: 180 LBS

## 2025-04-24 DIAGNOSIS — Z12.31 ENCOUNTER FOR SCREENING MAMMOGRAM FOR MALIGNANT NEOPLASM OF BREAST: ICD-10-CM

## 2025-04-24 PROCEDURE — 77063 BREAST TOMOSYNTHESIS BI: CPT

## 2025-05-17 NOTE — ASSESSMENT & PLAN NOTE
BP goal <130/80, continue regimen Pt informed & verbalizes understanding- rx tramadol sent to [facility] by Dr. Ledbetter

## 2025-05-20 ENCOUNTER — HOSPITAL ENCOUNTER (OUTPATIENT)
Facility: HOSPITAL | Age: 50
Setting detail: RECURRING SERIES
Discharge: HOME | End: 2025-05-23
Payer: MEDICAID

## 2025-05-20 PROCEDURE — 97161 PT EVAL LOW COMPLEX 20 MIN: CPT

## 2025-05-20 PROCEDURE — 97530 THERAPEUTIC ACTIVITIES: CPT

## 2025-05-20 NOTE — PROGRESS NOTES
PT DAILY TREATMENT NOTE/ANKLE [id number]      Patient Name: Eva Alan Galdino    Date: 2025    : 1975  Insurance: Payor: [facility] MEDICAID / Plan: MARILYN [facility] HEALTHKEEPERS PLUS / Product Type: *No Product type* /      Patient  verified yes     Visit #   Current / Total 1 16   Time   In / Out 10:30 am 11:08 am   Pain   In / Out 8/10 8/10   Subjective Functional Status/Changes: See Subjective Summary     Treatment Area: Right ankle pain    SUBJECTIVE    Subjective functional status/changes:     Chief Complaint: R ankle pain  History/Mechanism of Injury: pt was attacked by dog in , did PT in the meantime, surgery 2024, for posterior-superior heel resection with achilles tendon reattachment.  Pt had issues with healing - mild dehiscence, mild infection and hypersensitivity. Wound care complete at this time.  Current Symptoms/Functional Deficits: pinky toe limited mobility, pain distal medial, lateral, and posterior heel. Pt describes pain as achy, throbbing, at night, burning, sharp. Pt cannot walk without boot, feels tight trying to move it, going to bathroom without boot-very painful, limited standing/ambulation tolerance of 10 minutes at a time, painful stair negotiation  Pain-  Current: 8/10     Worst: 10/10   Best: 6/10  Previous Treatment/Compliance: icing irritates, rest ankle in pillow, lidocaine  Mobility Devices: scooter, cam boot, cane for around the house, compression sleeve- not since surgery too much compression but helped before   PMHx/Surgical Hx: mass removed from breast, colonoscopy, TIA-, BP medicine     Diagnostic Tests: [] Lab work [] X-rays    [] CT [] MRI     [] Other:  Results:    Work Hx: no work since may 2023, previous work as a cook at [employer]   Living Situation: 2 story - 3 stairs into home- bilateral rails, lives with son  Household Modifications: chair in shower, \"moved\" upstairs  Hobbies: read, family cookouts, play games with son, go

## 2025-05-20 NOTE — PROGRESS NOTES
[facility]IP[facility]ANT[facility] AT [facility]3]  [address] [place] [address] Phone: [phone] Fax [phone]  Plan of Care / Statement of Necessity for Physical Therapy Services     Patient Name: Eva Ahmadi : 1975   Treatment   Diagnosis: M25.571  RIGHT ANKLE PAIN  Medical Diagnosis: Right ankle pain [M25.571]   Onset Date: 24 (surgery) Payor Source: Payor: [facility] MEDICAID / Plan: [facility] HEALTHKEEPERS PLUS / Product Type: *No Product type* /    Referral Source: Latoya Pittman DPM Start of Care (SOC): 2025   Prior Hospitalization: See medical history Provider #: [id number]   Prior Level of Function: Not working 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  Insurance authorization should be expedited to prevent delay in treatment.     Assessment / key information:  Pt is a 50 y.o. female who presents with c/o right ankle pain S/P right posterior-superior heel resection and achilles tendon reattachment on 24. Due to issues with pain and incisional healing, Pt still in CAM boot and NWB, using lore-scooter but has recently been cleared for full, WBAT in CAM boot.  Functional deficits include: limited ankle/foot mobility; aching/throbbing, burning, sharp pains, especially at night that can disrupt sleep; inability to WB on right LE, even in CAM boot without increased pain; difficulty handling tasks in bathroom without CAM boot during the night; limited standing/ambulation tolerance of 10 minutes; and painful stair negotiation.

## 2025-05-27 ENCOUNTER — HOSPITAL ENCOUNTER (OUTPATIENT)
Facility: HOSPITAL | Age: 50
Setting detail: RECURRING SERIES
Discharge: HOME | End: 2025-05-30
Payer: MEDICAID

## 2025-05-27 PROCEDURE — 97113 AQUATIC THERAPY/EXERCISES: CPT

## 2025-05-29 ENCOUNTER — HOSPITAL ENCOUNTER (OUTPATIENT)
Facility: HOSPITAL | Age: 50
Setting detail: RECURRING SERIES
End: 2025-05-29
Payer: MEDICAID

## 2025-05-29 PROCEDURE — 97113 AQUATIC THERAPY/EXERCISES: CPT

## 2025-06-03 ENCOUNTER — HOSPITAL ENCOUNTER (OUTPATIENT)
Facility: HOSPITAL | Age: 50
Setting detail: RECURRING SERIES
Discharge: HOME | End: 2025-06-06
Payer: MEDICAID

## 2025-06-03 PROCEDURE — 97530 THERAPEUTIC ACTIVITIES: CPT

## 2025-06-03 PROCEDURE — 97113 AQUATIC THERAPY/EXERCISES: CPT

## 2025-06-05 ENCOUNTER — HOSPITAL ENCOUNTER (OUTPATIENT)
Facility: HOSPITAL | Age: 50
Setting detail: RECURRING SERIES
Discharge: HOME | End: 2025-06-08
Payer: MEDICAID

## 2025-06-05 PROCEDURE — 97113 AQUATIC THERAPY/EXERCISES: CPT

## 2025-06-10 ENCOUNTER — HOSPITAL ENCOUNTER (OUTPATIENT)
Facility: HOSPITAL | Age: 50
Setting detail: RECURRING SERIES
Discharge: HOME | End: 2025-06-13
Payer: MEDICAID

## 2025-06-10 PROCEDURE — 97112 NEUROMUSCULAR REEDUCATION: CPT

## 2025-06-10 PROCEDURE — 97530 THERAPEUTIC ACTIVITIES: CPT

## 2025-06-10 PROCEDURE — 97110 THERAPEUTIC EXERCISES: CPT

## 2025-06-10 NOTE — PROGRESS NOTES
PHYSICAL / OCCUPATIONAL THERAPY - DAILY TREATMENT NOTE    Patient Name: Eva Alan Galdino    Date: 6/10/2025    : 1975  Insurance: Payor: [facility] MEDICAID / Plan: MARILYN [facility] HEALTHKEEPERS PLUS / Product Type: *No Product type* /      Patient  verified Yes     Visit #   Current / Total 6 16   Time   In / Out 10:20 am 11:02 am   Pain   In / Out 6/10 6/10   Subjective Functional Status/Changes: \"I woke up with 10/10 pain this morning before taking my pain meds. The pool therapy seems to be irritating the right ankle more. I feel the ankle more in my boot when I'm walking outside now as well. Last night I had to sleep with the Lidocaine patches. The ice seems to irritate the ankle also.\"     TREATMENT AREA =  Right ankle pain    OBJECTIVE         Therapeutic Procedures:    Tx Min Billable or 1:1 Min (if diff from Tx Min) Procedure, Rationale, Specifics   12  78511 Therapeutic Exercise (timed):  increase ROM, strength, coordination, balance, and proprioception to improve patient's ability to progress to PLOF and address remaining functional goals. (see flow sheet as applicable)     Details if applicable:  towel scrunch, ankle circles, heel slides     14  41267 Therapeutic Activity (timed):  use of dynamic activities replicating functional movements to increase ROM, strength, coordination, balance, and proprioception in order to improve patient's ability to progress to PLOF and address remaining functional goals.  (see flow sheet as applicable)     Details if applicable:  sky HR/TR; education on desensitization techniques   9  13533 Neuromuscular Re-Education (timed):  improve balance, coordination, kinesthetic sense, posture, core stability and proprioception to improve patient's ability to develop conscious control of individual muscles and awareness of position of extremities in order to progress to PLOF and address remaining functional goals. (see flow sheet as applicable)     Details if

## 2025-06-12 ENCOUNTER — HOSPITAL ENCOUNTER (OUTPATIENT)
Facility: HOSPITAL | Age: 50
Setting detail: RECURRING SERIES
End: 2025-06-12
Payer: MEDICAID

## 2025-06-17 ENCOUNTER — HOSPITAL ENCOUNTER (OUTPATIENT)
Facility: HOSPITAL | Age: 50
Setting detail: RECURRING SERIES
Discharge: HOME | End: 2025-06-20
Payer: MEDICAID

## 2025-06-17 PROCEDURE — 97110 THERAPEUTIC EXERCISES: CPT

## 2025-06-17 NOTE — PROGRESS NOTES
PHYSICAL / OCCUPATIONAL THERAPY - DAILY TREATMENT NOTE    Patient Name: Eva Alan Galdino    Date: 2025    : 1975  Insurance: Payor: [facility] MEDICAID / Plan: MARILYN [facility] HEALTHKEEPERS PLUS / Product Type: *No Product type* /      Patient  verified Yes     Visit #   Current / Total 7 16   Time   In / Out 12:22 1:05   Pain   In / Out 7/10 5/10   Subjective Functional Status/Changes: \"I was busy with my family over the weekend, so I have pain in my R ankle.\"     TREATMENT AREA =  Right ankle pain    OBJECTIVE        Therapeutic Procedures:    Tx Min Billable or 1:1 Min (if diff from Tx Min) Procedure, Rationale, Specifics   43 [phone] [id number] Therapeutic Exercise (timed):  increase ROM, strength, coordination, balance, and proprioception to improve patient's ability to progress to PLOF and address remaining functional goals. (see flow sheet as applicable)     Details if applicable:  Include reassessment of goals             Details if applicable:            Details if applicable:            Details if applicable:            Details if applicable:     43 43 [facility] Totals Reminder: bill using total billable min of TIMED therapeutic procedures (example: do not include dry needle or estim unattended, both untimed codes, in totals to left)  8-22 min = 1 unit; 23-37 min = 2 units; 38-52 min = 3 units; 53-67 min = 4 units; 68-82 min = 5 units   Total Total     Charge Capture    [x]  Patient Education billed concurrently with other procedures   [x] Review HEP    [] Progressed/Changed HEP, detail:    [] Other detail:       Objective Information/Functional Measures/Assessment    Pt has attended skilled physical therapy for 6 visits to address Right ankle pain [M25.571] and has made slow progress thus far with therapy measures.  Objectively, Pt demonstrates reduced swelling, increased B hip flexion strength, impaired ambulation and stair navigation, and improved DF AROM with knee bent.  Pt's functional gains include

## 2025-06-17 NOTE — PROGRESS NOTES
[facility] AT [facility]   [address], [place] [address]  Phone: [phone] Fax: [phone]  PROGRESS NOTE  Patient Name: Eva Ahmadi : 1975   Treatment/Medical Diagnosis: Right ankle pain   Referral Source: Latoya Pittman DPM     Payor: Payor: [facility] MEDICAID / Plan: [facility] HEALTHKEEPERS PLUS / Product Type: *No Product type* /            Date of Initial Visit: 2025 Attended Visits: 6 Missed Visits: 1       CURRENT GOAL STATUS    Short Term Goals: To be accomplished in 8 treatments:  1- Goal: Pt to be compliant with initial HEP to improve right ankle/foot mobility to assist with normalization of gait pattern.  Status at last note/certification:Pt performing HEP but notes increased pain lately with exercises (6/10/25)  Current: not met - Not been able to keep up due to being busy (2025)    2- Goal: Pt to initiate aquatics program without increased pain to aid in achievement of all LTGs.  Status at last note/certification: performed 5 visits of aquatics with increases in pain, so trialing land today (6/10/25)  Current: not met - now doing land therapy only due to pain during aquatics (2025)    3- Goal: Pt to increase B ankle AROM by 10 deg grossly to allow for heel strike and normal heel to toe gait pattern without A.D once weaned from boot.  Status at last note/certification:    AROM (deg) Right Left   Dorsiflexion with Knee Flexed 2 4   Dorsiflexion with Knee Extended -10 -10   Plantar Flexion 53 60   Inversion 18 22   Eversion 15 18   Great Toe Extension 30 53     Current: progressing - see below (2025)   AROM (deg) Right Left   Dorsiflexion with Knee Flexed 5 4   Dorsiflexion with Knee Extended -14 -8   Plantar Flexion 50 60   Inversion 7 22   Eversion 6 18   Great Toe Extension 68 53     4- Goal: Pt to tolerate WB in order to increase stance time to be able to cook/prepare meals in home for

## 2025-06-19 ENCOUNTER — HOSPITAL ENCOUNTER (OUTPATIENT)
Facility: HOSPITAL | Age: 50
Setting detail: RECURRING SERIES
Discharge: HOME | End: 2025-06-22
Payer: MEDICAID

## 2025-06-19 PROCEDURE — 97112 NEUROMUSCULAR REEDUCATION: CPT

## 2025-06-19 PROCEDURE — 97530 THERAPEUTIC ACTIVITIES: CPT

## 2025-06-19 PROCEDURE — 97110 THERAPEUTIC EXERCISES: CPT

## 2025-06-19 NOTE — PROGRESS NOTES
PHYSICAL / OCCUPATIONAL THERAPY - DAILY TREATMENT NOTE    Patient Name: Eva Alan Galdino    Date: 2025    : 1975  Insurance: Payor: [facility] MEDICAID / Plan: MARILYN [facility] HEALTHKEEPERS PLUS / Product Type: *No Product type* /      Patient  verified Yes     Visit #   Current / Total 1 16   Time   In / Out 12:18 12:54   Pain   In / Out 4/10 5/10   Subjective Functional Status/Changes: I usually wake up with increased pain that gets better throughout the day.      TREATMENT AREA =  Right ankle pain    OBJECTIVE    Modalities Rationale:     decrease inflammation and decrease pain to improve patient's ability to progress to PLOF and address remaining functional goals.     min [] Estim Unattended, type/location:                                      []  w/ice    []  w/heat    min [] Estim Attended, type/location:                                     []  w/US     []  w/ice    []  w/heat    []  TENS insruct      min []  Mechanical Traction: type/lbs                   []  pro   []  sup   []  int   []  cont    []  before manual    []  after manual    min []  Ultrasound, settings/location:     3 +1 minute set up.  min  unbill [x]  Ice     []  Heat    location/position: Sitting right ankle.     min []  Paraffin,  details:     min []  Vasopneumatic Device, press/temp:     min []  Whirlpool / Fluido:    If using vaso (only need to measure limb vaso being performed on)      pre-treatment girth :       post-treatment girth :       measured at (landmark location) :      min []  Other:    Skin assessment post-treatment:   Intact      Therapeutic Procedures:    Tx Min Billable or 1:1 Min (if diff from Tx Min) Procedure, Rationale, Specifics   10 10 84831 Therapeutic Exercise (timed):  increase ROM, strength, coordination, balance, and proprioception to improve patient's ability to progress to PLOF and address remaining functional goals. (see flow sheet as applicable)     Details if applicable:       [id number]

## 2025-06-24 ENCOUNTER — HOSPITAL ENCOUNTER (OUTPATIENT)
Facility: HOSPITAL | Age: 50
Setting detail: RECURRING SERIES
End: 2025-06-24
Payer: MEDICAID

## 2025-06-30 ENCOUNTER — TELEPHONE (OUTPATIENT)
Age: 50
End: 2025-06-30

## 2025-06-30 NOTE — TELEPHONE ENCOUNTER
Patient last seen 1/22/2025 and was to follow up in 8 weeks. Patient has a follow up on 9/17/2025. Please advise.

## 2025-06-30 NOTE — TELEPHONE ENCOUNTER
Patient called to request a refill of predniSONE 10 MG (21) TBPK be sent to [facility] [id number] - [place], [place] - [facility] Eric Murry - P [phone] - F [phone] if possible.

## 2025-07-01 ENCOUNTER — HOSPITAL ENCOUNTER (OUTPATIENT)
Facility: HOSPITAL | Age: 50
Setting detail: RECURRING SERIES
Discharge: HOME | End: 2025-07-04
Payer: MEDICAID

## 2025-07-01 PROCEDURE — 97530 THERAPEUTIC ACTIVITIES: CPT

## 2025-07-01 PROCEDURE — 97110 THERAPEUTIC EXERCISES: CPT

## 2025-07-01 PROCEDURE — 97112 NEUROMUSCULAR REEDUCATION: CPT

## 2025-07-01 NOTE — PROGRESS NOTES
PHYSICAL / OCCUPATIONAL THERAPY - DAILY TREATMENT NOTE    Patient Name: Eva Alan Galdino    Date: 2025    : 1975  Insurance: Payor: [facility] MEDICAID / Plan: MARILYN [facility] HEALTHKEEPERS PLUS / Product Type: *No Product type* /      Patient  verified Yes     Visit #   Current / Total 2 16   Time   In / Out 11:42 12:23   Pain   In / Out 5 4   Subjective Functional Status/Changes: \"I feel good, no updates.\"     TREATMENT AREA =  Right ankle pain    OBJECTIVE    Modalities Rationale:     decrease inflammation and decrease pain to improve patient's ability to progress to PLOF and address remaining functional goals.    5 min  unbill [x]  Ice     []  Heat    location/position: Right ankle in long sitting       Therapeutic Procedures:    Tx Min Billable or 1:1 Min (if diff from Tx Min) Procedure, Rationale, Specifics   15  93589 Therapeutic Exercise (timed):  increase ROM, strength, coordination, balance, and proprioception to improve patient's ability to progress to PLOF and address remaining functional goals. (see flow sheet as applicable)     Details if applicable:       8  28309 Neuromuscular Re-Education (timed):  improve balance, coordination, kinesthetic sense, posture, core stability and proprioception to improve patient's ability to develop conscious control of individual muscles and awareness of position of extremities in order to progress to PLOF and address remaining functional goals. (see flow sheet as applicable)     Details if applicable:     13  54706 Therapeutic Activity (timed):  use of dynamic activities replicating functional movements to increase ROM, strength, coordination, balance, and proprioception in order to improve patient's ability to progress to PLOF and address remaining functional goals.  (see flow sheet as applicable)     Details if applicable:            Details if applicable:            Details if applicable:     36 36 [facility] Totals Reminder: bill using total billable min

## 2025-07-03 ENCOUNTER — HOSPITAL ENCOUNTER (OUTPATIENT)
Facility: HOSPITAL | Age: 50
Setting detail: RECURRING SERIES
Discharge: HOME | End: 2025-07-06
Payer: MEDICAID

## 2025-07-03 PROCEDURE — 97530 THERAPEUTIC ACTIVITIES: CPT

## 2025-07-03 PROCEDURE — 97110 THERAPEUTIC EXERCISES: CPT

## 2025-07-03 PROCEDURE — 97112 NEUROMUSCULAR REEDUCATION: CPT

## 2025-07-03 NOTE — PROGRESS NOTES
PHYSICAL / OCCUPATIONAL THERAPY - DAILY TREATMENT NOTE    Patient Name: Eva Alan Galdino    Date: 7/3/2025    : 1975  Insurance: Payor: [facility] MEDICAID / Plan: MARILYN [facility] HEALTHKEEPERS PLUS / Product Type: *No Product type* /      Patient  verified Yes     Visit #   Current / Total 3 16   Time   In / Out 11:42 12:19   Pain   In / Out 5 4   Subjective Functional Status/Changes: \"I feel okay, my ankle pain has been off and on.\"     TREATMENT AREA =  Right ankle pain    OBJECTIVE    Therapeutic Procedures:    Tx Min Billable or 1:1 Min (if diff from Tx Min) Procedure, Rationale, Specifics   8 8 70588 Therapeutic Exercise (timed):  increase ROM, strength, coordination, balance, and proprioception to improve patient's ability to progress to PLOF and address remaining functional goals. (see flow sheet as applicable)     Details if applicable:       11 11 80761 Neuromuscular Re-Education (timed):  improve balance, coordination, kinesthetic sense, posture, core stability and proprioception to improve patient's ability to develop conscious control of individual muscles and awareness of position of extremities in order to progress to PLOF and address remaining functional goals. (see flow sheet as applicable)     Details if applicable:     6  25900 Manual Therapy (timed):  increase ROM to improve patient's ability to progress to PLOF and address remaining functional goals.  The manual therapy interventions were performed at a separate and distinct time from the therapeutic activities interventions . (see flow sheet as applicable)     Details if applicable:  longsitting - right IV/EV mobs grade 2-3   12 12         Details if applicable:            Details if applicable:     37 37 [facility] Totals Reminder: bill using total billable min of TIMED therapeutic procedures (example: do not include dry needle or estim unattended, both untimed codes, in totals to left)  8-22 min = 1 unit; 23-37 min = 2 units; 38-52 min

## 2025-07-08 ENCOUNTER — SCHEDULED TELEPHONE ENCOUNTER (OUTPATIENT)
Age: 50
End: 2025-07-08

## 2025-07-08 ENCOUNTER — HOSPITAL ENCOUNTER (OUTPATIENT)
Facility: HOSPITAL | Age: 50
Setting detail: RECURRING SERIES
End: 2025-07-08
Payer: MEDICAID

## 2025-07-08 DIAGNOSIS — M54.2 CERVICAL PAIN: Primary | ICD-10-CM

## 2025-07-08 DIAGNOSIS — M54.12 CERVICAL RADICULOPATHY: ICD-10-CM

## 2025-07-08 RX ORDER — OXYCODONE AND ACETAMINOPHEN 5; 325 MG/1; MG/1
TABLET ORAL
COMMUNITY
Start: 2025-04-29

## 2025-07-08 RX ORDER — PREGABALIN 75 MG/1
75 CAPSULE ORAL 2 TIMES DAILY
Qty: 60 CAPSULE | Refills: 1 | Status: SHIPPED | OUTPATIENT
Start: 2025-07-08 | End: 2025-09-06

## 2025-07-08 RX ORDER — ASPIRIN 81 MG
TABLET,CHEWABLE ORAL
COMMUNITY
Start: 2025-04-01

## 2025-07-08 RX ORDER — PREDNISONE 10 MG/1
TABLET ORAL
Qty: 1 EACH | Refills: 0 | Status: SHIPPED | OUTPATIENT
Start: 2025-07-08

## 2025-07-08 NOTE — PROGRESS NOTES
Eva Smithnum presents today for   Chief Complaint   Patient presents with    Pain    Neck Pain    Shoulder Pain       Is someone accompanying this pt? no    Is the patient using any DME equipment during OV? no    Depression Screening:       No data to display                Learning Assessment:  Failed to redirect to the Timeline version of the [employer] SmartLink.    Abuse Screening:       No data to display                Fall Risk  Failed to redirect to the Timeline version of the [employer] SmartLink.    OPIOID RISK TOOL  Failed to redirect to the Timeline version of the [employer] SmartLink.    Coordination of Care:  1. Have you been to the ER, urgent care clinic since your last visit? no  Hospitalized since your last visit? no    2. Have you seen or consulted any other health care providers outside of the [facility] since your last visit? no Include any pap smears or colon screening. no

## 2025-07-10 ENCOUNTER — HOSPITAL ENCOUNTER (OUTPATIENT)
Facility: HOSPITAL | Age: 50
Setting detail: RECURRING SERIES
Discharge: HOME | End: 2025-07-13
Payer: MEDICAID

## 2025-07-10 PROCEDURE — 97110 THERAPEUTIC EXERCISES: CPT

## 2025-07-10 PROCEDURE — 97112 NEUROMUSCULAR REEDUCATION: CPT

## 2025-07-10 PROCEDURE — 97530 THERAPEUTIC ACTIVITIES: CPT

## 2025-07-10 NOTE — PROGRESS NOTES
23-37 min = 2 units; 38-52 min = 3 units; 53-67 min = 4 units; 68-82 min = 5 units   Total Total     Charge Capture    [x]  Patient Education billed concurrently with other procedures   [x] Review HEP    [] Progressed/Changed HEP, detail:    [] Other detail:       Objective Information/Functional Measures/Assessment    PT reports to skilled therapy session with increased right ankle pain with ADLs. Ankle ROM assessed at the start of session with patient demonstrating progress particularly with right ankle inversion/eversion. Pt tolerated added resistance to seated heel raises noting only minor fatigue but no pain. Repetitions progressed during ankle 4 ways with therapist providing tactile cues to limit compensations. Session tolerated well.     Patient will continue to benefit from skilled PT / OT services to modify and progress therapeutic interventions, analyze and address functional mobility deficits, analyze and address ROM deficits, analyze and address strength deficits, analyze and address soft tissue restrictions, analyze and cue for proper movement patterns, and analyze and modify for postural abnormalities to address functional deficits and attain remaining goals.    Progress toward goals / Updated goals:  []  See Progress Note/Recertification    Short Term Goals: To be accomplished in 16 treatments:  1- Goal: Pt to be compliant with initial HEP to improve right ankle/foot mobility to assist with normalization of gait pattern.  Status at last note/certification: not met - Not been able to keep up due to being busy (6/17/2025)  Current: not met - Pt will fit in exercises when she can (7/3/2025)     2- Goal: Pt to increase B ankle AROM by 10 deg grossly to allow for heel strike and normal heel to toe gait pattern without A.D once weaned from boot.  Status at last note/certification: progressing - see below (6/17/2025)   AROM (deg) Right Left   Dorsiflexion with Knee Flexed 5 4   Dorsiflexion with Knee

## 2025-07-15 ENCOUNTER — HOSPITAL ENCOUNTER (OUTPATIENT)
Facility: HOSPITAL | Age: 50
Setting detail: RECURRING SERIES
Discharge: HOME | End: 2025-07-18
Payer: MEDICAID

## 2025-07-15 PROCEDURE — 97112 NEUROMUSCULAR REEDUCATION: CPT

## 2025-07-15 PROCEDURE — 97530 THERAPEUTIC ACTIVITIES: CPT

## 2025-07-15 PROCEDURE — 97110 THERAPEUTIC EXERCISES: CPT

## 2025-07-15 NOTE — PROGRESS NOTES
PHYSICAL / OCCUPATIONAL THERAPY - DAILY TREATMENT NOTE    Patient Name: Eva Alan Galdino    Date: 7/15/2025    : 1975  Insurance: Payor: [facility] MEDICAID / Plan: [facility] [employer]US / Product Type: *No Product type* /      Patient  verified Yes     Visit #   Current / Total 5 16   Time   In / Out 11:40 12:20   Pain   In / Out 5/10 5/10   Subjective Functional Status/Changes: I've been running into trouble when trying to get the MRI scheduled.      TREATMENT AREA =  Right ankle pain    OBJECTIVE  Therapeutic Procedures:    Tx Min Billable or 1:1 Min (if diff from Tx Min) Procedure, Rationale, Specifics   10 10 [id number] Therapeutic Exercise (timed):  increase ROM, strength, coordination, balance, and proprioception to improve patient's ability to progress to PLOF and address remaining functional goals. (see flow sheet as applicable)     Details if applicable:       15 15 [id number] Neuromuscular Re-Education (timed):  improve balance, coordination, kinesthetic sense, posture, core stability and proprioception to improve patient's ability to develop conscious control of individual muscles and awareness of position of extremities in order to progress to PLOF and address remaining functional goals. (see flow sheet as applicable)     Details if applicable:     15 15 [id number] Therapeutic Activity (timed):  use of dynamic activities replicating functional movements to increase ROM, strength, coordination, balance, and proprioception in order to improve patient's ability to progress to PLOF and address remaining functional goals.  (see flow sheet as applicable)     Details if applicable:  to include goal assessment.              40 40 [facility] Totals Reminder: bill using total billable min of TIMED therapeutic procedures (example: do not include dry needle or estim unattended, both untimed codes, in totals to left)  8-22 min = 1 unit; 23-37 min = 2 units; 38-52 min = 3 units; 53-67 min = 4 units; 68-82 min = 5 
    Current:      3- Goal: Pt to tolerate FWB without shoe household distances in order to be able to cook/prepare meals in home for herself and son.  Status at last note/certification: progressing - 1 hour walking tolerance in CAM boot. 15 minute standing/ walking tolerance in regular shoes.  (7/15/2025)  Current:      Long Term Goals: To be accomplished in 10 treatments:  1- Goal: Pt to increase B LE strength to 5/5 grossly to increase standing/ambulation tolerance for household maintenance.  Status at last note/certification: progressing - see below (7/15/2025)      Strength: Right (/5) Left (/5)   Hip     Flexion 4+ 4+             Abduction 4 4             Adduction 4- 4-             Extension 4- 4   Ankle   Dorsiflexion 4 4-               PF 4 5               Inversion 4 5               Eversion 5 4+   ** PF was done in long sitting   Current:         2- Goal: Pt negotiate stairs with reciprocal pattern, in regular shoe, without deviations, for ease of navigating in home and community.  Status at last note/certification: progressing: step to pattern during ascent, leading with left LE. Step to pattern during descent, leading with right LE. Bilateral UE assistance at rails with both. (7/15/2025)  Current:      3- Goal: Pt to ambulate community distances without A.D in regular shoe with minimal to no deviations to increase independence with ADLs.  Status at last note/certification: progressin hour walking tolerance in CAM boot. 15 minute standing/ walking tolerance in regular shoes. (7/15/2025)  Current:      4- Goal: Pt to report < 5/10 pain at worst to increase ease with ADLs.  Status at last note/certification: not met: 8/10 pain at worst. (7/15/2025)  Current:      5- Goal: Pt to report SHERRY score of 60% to show improved function and quality of life.  Status at last note/certification: regressing: Pt denies any decline in function. 31%. (7/15/2025)  Current:     Frequency / Duration:   Patient to be seen

## 2025-07-17 ENCOUNTER — APPOINTMENT (OUTPATIENT)
Facility: HOSPITAL | Age: 50
End: 2025-07-17
Payer: MEDICAID

## 2025-07-24 ENCOUNTER — TRANSCRIBE ORDERS (OUTPATIENT)
Facility: HOSPITAL | Age: 50
End: 2025-07-24

## 2025-07-24 DIAGNOSIS — M25.571 RIGHT ANKLE PAIN, UNSPECIFIED CHRONICITY: ICD-10-CM

## 2025-07-24 DIAGNOSIS — M76.61 TENDONITIS, ACHILLES, RIGHT: ICD-10-CM

## 2025-07-24 DIAGNOSIS — L91.0 KELOID SCAR: ICD-10-CM

## 2025-07-24 DIAGNOSIS — M19.071 ARTHRITIS OF RIGHT ANKLE: ICD-10-CM

## 2025-07-24 DIAGNOSIS — M77.31 CALCANEAL SPUR OF RIGHT FOOT: ICD-10-CM

## 2025-07-24 DIAGNOSIS — M65.871 OTHER SYNOVITIS AND TENOSYNOVITIS, RIGHT ANKLE AND FOOT: Primary | ICD-10-CM

## 2025-08-12 ENCOUNTER — HOSPITAL ENCOUNTER (OUTPATIENT)
Facility: HOSPITAL | Age: 50
Setting detail: RECURRING SERIES
Discharge: HOME | End: 2025-08-15
Payer: MEDICAID

## 2025-08-12 PROCEDURE — 97110 THERAPEUTIC EXERCISES: CPT

## 2025-08-12 PROCEDURE — 97530 THERAPEUTIC ACTIVITIES: CPT

## 2025-08-12 PROCEDURE — 97112 NEUROMUSCULAR REEDUCATION: CPT

## 2025-08-19 ENCOUNTER — HOSPITAL ENCOUNTER (OUTPATIENT)
Facility: HOSPITAL | Age: 50
Setting detail: RECURRING SERIES
Discharge: HOME | End: 2025-08-22
Payer: MEDICAID

## 2025-08-19 PROCEDURE — 97110 THERAPEUTIC EXERCISES: CPT

## 2025-08-19 PROCEDURE — 97112 NEUROMUSCULAR REEDUCATION: CPT

## 2025-08-19 PROCEDURE — 97530 THERAPEUTIC ACTIVITIES: CPT

## 2025-08-21 ENCOUNTER — HOSPITAL ENCOUNTER (OUTPATIENT)
Facility: HOSPITAL | Age: 50
Discharge: HOME | End: 2025-08-24
Attending: PODIATRIST
Payer: MEDICAID

## 2025-08-21 DIAGNOSIS — M25.571 RIGHT ANKLE PAIN, UNSPECIFIED CHRONICITY: ICD-10-CM

## 2025-08-21 DIAGNOSIS — L91.0 KELOID SCAR: ICD-10-CM

## 2025-08-21 DIAGNOSIS — M19.071 ARTHRITIS OF RIGHT ANKLE: ICD-10-CM

## 2025-08-21 DIAGNOSIS — M77.31 CALCANEAL SPUR OF RIGHT FOOT: ICD-10-CM

## 2025-08-21 DIAGNOSIS — M76.61 TENDONITIS, ACHILLES, RIGHT: ICD-10-CM

## 2025-08-21 DIAGNOSIS — M65.871 OTHER SYNOVITIS AND TENOSYNOVITIS, RIGHT ANKLE AND FOOT: ICD-10-CM

## 2025-08-21 PROCEDURE — 73721 MRI JNT OF LWR EXTRE W/O DYE: CPT

## 2025-08-26 ENCOUNTER — HOSPITAL ENCOUNTER (OUTPATIENT)
Facility: HOSPITAL | Age: 50
Setting detail: RECURRING SERIES
End: 2025-08-26
Payer: MEDICAID

## (undated) DEVICE — DRAPE,EXTREMITY,89X128,STERILE: Brand: MEDLINE

## (undated) DEVICE — SUTURE ETHILON SZ 4-0 L18IN NONABSORBABLE BLK L19MM PC-5 3/8 1894G

## (undated) DEVICE — GLOVE ORTHO 8   MSG9480

## (undated) DEVICE — STRIP SKIN CLSR W0.25XL4IN WHT SPUNBOUND FBR NYL HI ADH

## (undated) DEVICE — SYRINGE MED 25GA 3ML L5/8IN SUBQ PLAS W/ DETACH NDL SFTY

## (undated) DEVICE — YANKAUER,SMOOTH HANDLE,HIGH CAPACITY: Brand: MEDLINE INDUSTRIES, INC.

## (undated) DEVICE — SYRINGE MED 50ML LUERSLIP TIP

## (undated) DEVICE — CUFF TRNQT AD W4IN 34IN CIRC SURG GEL SGL PRT BLDR PNEUMAT

## (undated) DEVICE — TRAP SPEC POLYP REM STRNR CLN DSGN MAGNIFYING WIND DISP

## (undated) DEVICE — SUTURE VICRYL SZ 2-0 L27IN ABSRB VLT L36MM CT-1 1/2 CIR J339H

## (undated) DEVICE — SYRINGE 20ML LL S/C 50

## (undated) DEVICE — APPLICATOR MEDICATED 26 CC SOLUTION HI LT ORNG CHLORAPREP

## (undated) DEVICE — CANNULA NSL AD TBNG L14FT STD PVC O2 CRV CONN NONFLARED NSL

## (undated) DEVICE — ZIMMER® STERILE DISPOSABLE TOURNIQUET CUFF WITH PROTECTIVE SLEEVE AND PLC, DUAL PORT, SINGLE BLADDER, 18 IN. (46 CM)

## (undated) DEVICE — TAPE SUT SM CTX 2.2 MM 40 IN 1/2 CIR TAPER WHT XBRAID TT

## (undated) DEVICE — Device

## (undated) DEVICE — BANDAGE,GAUZE,BULKEE II,4.5"X4.1YD,STRL: Brand: MEDLINE

## (undated) DEVICE — CANNULA ORIG TL CLR W FOAM CUSHIONS AND 14FT SUPL TB 3 CHN

## (undated) DEVICE — BLADE SAW SM BNE 31X9.8X0.4X0.6 MM STRYKR NS DISP

## (undated) DEVICE — BANDAGE COBAN 4 IN COMPR W4INXL5YD FOAM COHESIVE QUIK STK SELF ADH SFT

## (undated) DEVICE — SOLUTION IRRIG 1000ML H2O STRL BLT

## (undated) DEVICE — STOCKINETTE,IMPERVIOUS,12X48,STERILE: Brand: MEDLINE

## (undated) DEVICE — LIGHT HANDLE: Brand: DEVON

## (undated) DEVICE — ENDOSCOPY PUMP TUBING/ CAP SET: Brand: ERBE

## (undated) DEVICE — LINER SUCT CANSTR 3000CC PLAS SFT PRE ASSEMB W/OUT TBNG W/

## (undated) DEVICE — INTENDED FOR TISSUE SEPARATION, AND OTHER PROCEDURES THAT REQUIRE A SHARP SURGICAL BLADE TO PUNCTURE OR CUT.: Brand: BARD-PARKER SAFETY BLADES SIZE 10, STERILE

## (undated) DEVICE — THREE-QUARTER SHEET: Brand: CONVERTORS

## (undated) DEVICE — SPLINT ORTH W4INXL15FT FBRGLS PREPADDED H2O REPELLENT FELT

## (undated) DEVICE — GAUZE,SPONGE,4"X4",16PLY,STRL,LF,10/TRAY: Brand: MEDLINE

## (undated) DEVICE — SNARE ENDOSCP DIA9MM SHTH DIA2.4MM CLD FOR POLYP EXACTO

## (undated) DEVICE — Z INACTIVE USE 2855128 SPONGE GZ 16 PLY WVN COT 4INX4IN  HHH

## (undated) DEVICE — SUTURE VICRYL + SZ 3-0 L27IN ABSRB UD L26MM SH 1/2 CIR VCP416H

## (undated) DEVICE — SOLUTION IRRIG 1000ML 0.9% SOD CHL USP POUR PLAS BTL

## (undated) DEVICE — MASTISOL ADHESIVE LIQ 2/3ML

## (undated) DEVICE — BNDG,ELSTC,MATRIX,STRL,6"X5YD,LF,HOOK&LP: Brand: MEDLINE

## (undated) DEVICE — SHOE POSTOP M WOMAN 6-8 UNIV FOAM TRICOT SEMI FLX SKID

## (undated) DEVICE — MEDI-VAC NON-CONDUCTIVE SUCTION TUBING: Brand: CARDINAL HEALTH

## (undated) DEVICE — CATHETER SUCT TR FL TIP 14FR W/ O CTRL

## (undated) DEVICE — SHOE POSTOP M MAN 9-11 UNIV FOAM TRICOT SEMI FLX SKID

## (undated) DEVICE — SUTURE VICRYL SZ 4-0 L27IN ABSRB UD L26MM SH 1/2 CIR J415H

## (undated) DEVICE — SYRINGE MED 10ML LUERLOCK TIP W/O SFTY DISP

## (undated) DEVICE — BLADE CLIPPER GEN PURP NS

## (undated) DEVICE — ELECTRODE PT RET AD L9FT HI MOIST COND ADH HYDRGEL CORDED

## (undated) DEVICE — GOWN PLASTIC FILM THMBHKS UNIV BLUE: Brand: CARDINAL HEALTH

## (undated) DEVICE — DRESSING,GAUZE,XEROFORM,CURAD,1"X8",ST: Brand: CURAD

## (undated) DEVICE — UNDERPAD INCONT W23XL36IN STD BLU POLYPR BK FLUF SFT